# Patient Record
Sex: MALE | Race: BLACK OR AFRICAN AMERICAN | NOT HISPANIC OR LATINO
[De-identification: names, ages, dates, MRNs, and addresses within clinical notes are randomized per-mention and may not be internally consistent; named-entity substitution may affect disease eponyms.]

---

## 2019-11-25 ENCOUNTER — TRANSCRIPTION ENCOUNTER (OUTPATIENT)
Age: 59
End: 2019-11-25

## 2019-12-05 ENCOUNTER — INPATIENT (INPATIENT)
Facility: HOSPITAL | Age: 59
LOS: 4 days | Discharge: HOME | DRG: 300 | End: 2019-12-10
Attending: STUDENT IN AN ORGANIZED HEALTH CARE EDUCATION/TRAINING PROGRAM | Admitting: HOSPITALIST
Payer: COMMERCIAL

## 2019-12-05 VITALS
DIASTOLIC BLOOD PRESSURE: 84 MMHG | HEIGHT: 73 IN | SYSTOLIC BLOOD PRESSURE: 147 MMHG | RESPIRATION RATE: 17 BRPM | TEMPERATURE: 98 F | HEART RATE: 55 BPM | OXYGEN SATURATION: 99 % | WEIGHT: 315 LBS

## 2019-12-05 DIAGNOSIS — I82.492 ACUTE EMBOLISM AND THROMBOSIS OF OTHER SPECIFIED DEEP VEIN OF LEFT LOWER EXTREMITY: ICD-10-CM

## 2019-12-05 LAB
ALBUMIN SERPL ELPH-MCNC: 3.6 G/DL — SIGNIFICANT CHANGE UP (ref 3.3–5)
ALP SERPL-CCNC: 80 U/L — SIGNIFICANT CHANGE UP (ref 40–120)
ALT FLD-CCNC: 9 U/L — LOW (ref 10–45)
ANION GAP SERPL CALC-SCNC: 11 MMOL/L — SIGNIFICANT CHANGE UP (ref 5–17)
APTT BLD: 39.2 SEC — HIGH (ref 27.5–36.3)
AST SERPL-CCNC: 10 U/L — SIGNIFICANT CHANGE UP (ref 10–40)
BILIRUB SERPL-MCNC: 0.3 MG/DL — SIGNIFICANT CHANGE UP (ref 0.2–1.2)
BUN SERPL-MCNC: 12 MG/DL — SIGNIFICANT CHANGE UP (ref 7–23)
CALCIUM SERPL-MCNC: 9.1 MG/DL — SIGNIFICANT CHANGE UP (ref 8.4–10.5)
CHLORIDE SERPL-SCNC: 105 MMOL/L — SIGNIFICANT CHANGE UP (ref 96–108)
CO2 SERPL-SCNC: 25 MMOL/L — SIGNIFICANT CHANGE UP (ref 22–31)
CREAT SERPL-MCNC: 0.78 MG/DL — SIGNIFICANT CHANGE UP (ref 0.5–1.3)
GLUCOSE SERPL-MCNC: 93 MG/DL — SIGNIFICANT CHANGE UP (ref 70–99)
HCT VFR BLD CALC: 32 % — LOW (ref 39–50)
HGB BLD-MCNC: 10 G/DL — LOW (ref 13–17)
INR BLD: 2.12 RATIO — HIGH (ref 0.88–1.16)
MCHC RBC-ENTMCNC: 25.8 PG — LOW (ref 27–34)
MCHC RBC-ENTMCNC: 31.3 GM/DL — LOW (ref 32–36)
MCV RBC AUTO: 82.5 FL — SIGNIFICANT CHANGE UP (ref 80–100)
NRBC # BLD: 0 /100 WBCS — SIGNIFICANT CHANGE UP (ref 0–0)
NT-PROBNP SERPL-SCNC: 276 PG/ML — SIGNIFICANT CHANGE UP (ref 0–300)
PLATELET # BLD AUTO: 301 K/UL — SIGNIFICANT CHANGE UP (ref 150–400)
POTASSIUM SERPL-MCNC: 4.2 MMOL/L — SIGNIFICANT CHANGE UP (ref 3.5–5.3)
POTASSIUM SERPL-SCNC: 4.2 MMOL/L — SIGNIFICANT CHANGE UP (ref 3.5–5.3)
PROT SERPL-MCNC: 6.7 G/DL — SIGNIFICANT CHANGE UP (ref 6–8.3)
PROTHROM AB SERPL-ACNC: 24.7 SEC — HIGH (ref 10–12.9)
RBC # BLD: 3.88 M/UL — LOW (ref 4.2–5.8)
RBC # FLD: 15.3 % — HIGH (ref 10.3–14.5)
SODIUM SERPL-SCNC: 141 MMOL/L — SIGNIFICANT CHANGE UP (ref 135–145)
TROPONIN T, HIGH SENSITIVITY RESULT: 11 NG/L — SIGNIFICANT CHANGE UP (ref 0–51)
TROPONIN T, HIGH SENSITIVITY RESULT: 12 NG/L — SIGNIFICANT CHANGE UP (ref 0–51)
WBC # BLD: 4.61 K/UL — SIGNIFICANT CHANGE UP (ref 3.8–10.5)
WBC # FLD AUTO: 4.61 K/UL — SIGNIFICANT CHANGE UP (ref 3.8–10.5)

## 2019-12-05 PROCEDURE — 93010 ELECTROCARDIOGRAM REPORT: CPT

## 2019-12-05 PROCEDURE — 71046 X-RAY EXAM CHEST 2 VIEWS: CPT | Mod: 26

## 2019-12-05 PROCEDURE — 93971 EXTREMITY STUDY: CPT | Mod: 26

## 2019-12-05 PROCEDURE — 99285 EMERGENCY DEPT VISIT HI MDM: CPT

## 2019-12-05 PROCEDURE — 99223 1ST HOSP IP/OBS HIGH 75: CPT

## 2019-12-05 RX ORDER — ACETAMINOPHEN 500 MG
650 TABLET ORAL EVERY 6 HOURS
Refills: 0 | Status: DISCONTINUED | OUTPATIENT
Start: 2019-12-05 | End: 2019-12-10

## 2019-12-05 RX ORDER — HEPARIN SODIUM 5000 [USP'U]/ML
10000 INJECTION INTRAVENOUS; SUBCUTANEOUS ONCE
Refills: 0 | Status: COMPLETED | OUTPATIENT
Start: 2019-12-05 | End: 2019-12-05

## 2019-12-05 RX ORDER — HEPARIN SODIUM 5000 [USP'U]/ML
INJECTION INTRAVENOUS; SUBCUTANEOUS
Qty: 25000 | Refills: 0 | Status: DISCONTINUED | OUTPATIENT
Start: 2019-12-05 | End: 2019-12-10

## 2019-12-05 RX ORDER — HEPARIN SODIUM 5000 [USP'U]/ML
10000 INJECTION INTRAVENOUS; SUBCUTANEOUS EVERY 6 HOURS
Refills: 0 | Status: DISCONTINUED | OUTPATIENT
Start: 2019-12-05 | End: 2019-12-10

## 2019-12-05 RX ORDER — HEPARIN SODIUM 5000 [USP'U]/ML
5000 INJECTION INTRAVENOUS; SUBCUTANEOUS EVERY 6 HOURS
Refills: 0 | Status: DISCONTINUED | OUTPATIENT
Start: 2019-12-05 | End: 2019-12-10

## 2019-12-05 RX ADMIN — HEPARIN SODIUM 10000 UNIT(S): 5000 INJECTION INTRAVENOUS; SUBCUTANEOUS at 19:59

## 2019-12-05 RX ADMIN — HEPARIN SODIUM 2400 UNIT(S)/HR: 5000 INJECTION INTRAVENOUS; SUBCUTANEOUS at 19:59

## 2019-12-05 NOTE — H&P ADULT - NSHPLABSRESULTS_GEN_ALL_CORE
Personally reviewed labs.   Personally reviewed imaging.                        10.0   4.61  )-----------( 301      ( 05 Dec 2019 12:35 )             32.0       12-05    141  |  105  |  12  ----------------------------<  93  4.2   |  25  |  0.78    Ca    9.1      05 Dec 2019 12:35    TPro  6.7  /  Alb  3.6  /  TBili  0.3  /  DBili  x   /  AST  10  /  ALT  9<L>  /  AlkPhos  80  12-05            LIVER FUNCTIONS - ( 05 Dec 2019 12:35 )  Alb: 3.6 g/dL / Pro: 6.7 g/dL / ALK PHOS: 80 U/L / ALT: 9 U/L / AST: 10 U/L / GGT: x             PT/INR - ( 05 Dec 2019 12:35 )   PT: 24.7 sec;   INR: 2.12 ratio         PTT - ( 05 Dec 2019 12:35 )  PTT:39.2 sec

## 2019-12-05 NOTE — ED PROVIDER NOTE - PMH
DVT (deep venous thrombosis)  LLE, 11/2019, on Xarelto, provoked after 13 hour car ride  Pulmonary embolism  b/l, 11/2019, on Xarelto

## 2019-12-05 NOTE — H&P ADULT - PROBLEM SELECTOR PLAN 4
Transitions of Care Status:  1.  Name of PCP:  2.  PCP Contacted on Admission: [ ] Y    [ ] N    3.  PCP contacted at Discharge: [ ] Y    [ ] N    [ ] N/A  4.  Post-Discharge Appointment Date and Location:  5.  Summary of Handoff given to PCP: - ekg nonischemic, also first degree heart block  - pt asymptomatic  - check tsh

## 2019-12-05 NOTE — ED ADULT TRIAGE NOTE - CHIEF COMPLAINT QUOTE
dx with B/L PE and LLE DVT x2 weeks ago. Left leg and left foot swelling x2 weeks and getting worse. mild SOB with exertion. SPO2 99% room air. Denies chest pain.

## 2019-12-05 NOTE — H&P ADULT - HISTORY OF PRESENT ILLNESS
59M c hx obesity, provoked DVT (~4 yrs ago), recently diagnosed LLE DVT c/b b/l PE (dx'd 2 weeks ago @ [facility]) on xarelto, pw worsening LLE swelling and pain.    Pt doesn't have good memory of his DVT 4 yrs ago, but thinks it was provoked after a plane flight from [place], and that he was treated for approximately 6 months. Pt has otherwise been well since then. Pt reports frequent 13 hour car rides to [place], where he his job takes him. Most recently, pt reports coming back from [place], then feeling SOB, which led him to [facility] and the above diagnoses. Pt reports his SOB is now much better. Several days ago, pt reports trying on a pair of shoes that couldn't fit. Pt also reports taking percocet at home for the pain in his leg, which brings him into the hospital. Pt reports his father passed away at age around 66 2/2 ?leg clot.    VS Tm 98.3, P 55, /72, R 18, 77% RA  in the ED, received hep gtt. 59M c hx obesity, provoked DVT (~4 yrs ago), recently diagnosed LLE DVT c/b b/l PE (dx'd 2 weeks ago @ [facility]) on xarelto, pw worsening LLE swelling and pain.    Pt doesn't have good memory of his DVT 4 yrs ago, but thinks it was provoked after a plane flight from [place], and that he was treated for approximately 6 months. Pt has otherwise been well since then. Pt reports frequent 13 hour car rides to [place], where he his job takes him. Most recently, pt reports coming back from [place], then feeling SOB, which led him to [facility] and the above diagnoses. Pt reports his SOB is now much better. Several days ago, pt reports trying on a pair of shoes that couldn't fit. Pt also reports taking percocet at home for the pain in his leg, which brings him into the hospital. Pt reports his father passed away at age around 66 2/2 ?leg clot.    VS Tm 98.3, P 55, /72, R 18, 77% RA  in the ED, received hep gtt.    ISTOP Reference #: [id number]  Rx Written	Rx Dispensed	Drug	Quantity	Days Supply	Prescriber Name  12/03/2019	12/04/2019	oxycodone-acetaminophen 5-325 mg tablet	21	7	Jones Kennedy MD

## 2019-12-05 NOTE — H&P ADULT - PROBLEM SELECTOR PLAN 1
- per history, appears to be pt's 2nd lifetime provoked DVT  - per ED notes, comparison with duplex report from [facility] shows extension of LLE DVT, concerning for failure of xarelto  - f/u Heme recs  - per heme, pt should be on hep gtt bridged to coumadin  - will give one dose coumadin tonight  - of note, pt has prolonged PT/INR before starting coumadin, presumed 2/2 xarelto, which pt reports good compliance with  - pt requests placement on a different novel oral anticoagulant rather than lovenox or coumadin. will need discussion with heme  - will check lupus anticoagulant in AM  - other hypercoag workup deferred to heme  - hold off IVC filter for now

## 2019-12-05 NOTE — H&P ADULT - NSHPSOCIALHISTORY_GEN_ALL_CORE
Social History:    Marital Status: ( x ) , (  ) Single, (  ) , (  ) , (  )   # of Children: no biologic children. 2 adopted sons  Lives with: (  ) alone, ( x ) children, ( x ) spouse, (  ) parents, (  ) siblings, (  ) friends, (  ) other:   Occupation:     Substance Use/Illicit Drugs: (  ) never used vs other:   Tobacco Usage: ( x ) never smoked, (  ) former smoker, (  ) current smoker and Total Pack-Years:   Last Alcohol Usage/Frequency/Amount/Withdrawal/Hx of Abuse:  last drink 1 month ago  Foreign travel:   Animal exposure:

## 2019-12-05 NOTE — ED PROVIDER NOTE - PROGRESS NOTE DETAILS
called [facility], ER unable to provide [facility] with report verbalize or faxed, must obtain from medical records. Medical records requires consent form to be filled out by pt and faxed.   d/w pt PCP, called by pt wife, states he has been managing pt for superficial thrombophlebitis outpt. Would appreciate vascular consult when comparison study is obtained if there is a significant difference between the two studies.  Consent form obtained and signed, given to  to fax.  Pt wife becoming impatient frequently at the provider charting area reminding staff that she wants to leave. -Tom Person PA-C Previous study at [facility] shows occlusive L popliteal DVT, occlusive left small saphenous vein thrombosis and L thigh thrombophlebitis.  Significant increase in size of clot reported doppler today. results d/w pt PCP Dr. Kennedy, who states pt should be admitted to the hospitalist. d/w hospitalist who requests hematology input. Hematology fellow recommends admission for Heparin and bridge to coumadin given pt failed DOAC. recommendations d/w hospitalist who accepts pt for new admission. -Tom Person PA-C

## 2019-12-05 NOTE — ED PROVIDER NOTE - CARE PLAN
Principal Discharge DX:	Acute deep vein thrombosis (DVT) of other specified vein of left lower extremity

## 2019-12-05 NOTE — ED PROVIDER NOTE - PHYSICAL EXAMINATION
GEN: Pt in NAD, A&O x3.  PSYCH: Affect appropriate.  EYES: Sclera white w/o injection.   RESP: No chest wall tenderness, CTA b/l, no wheezes, rales, or rhonchi.   CARDIAC: RRR, clear distinct S1, S2, no appreciable murmurs.  ABD: Abdomen soft, non-tender. No CVAT b/l.  VASC: 2+ radial and dorsalis pedis pulses b/l. LLE 3+ pitting edema, 1+ edema RLE (pt and wife states is baseline). LLE warm. Mild L calf tenderness. Negative Homans.  MSK: FROM w/o pain b/l LE.  NEURO: Normal and equal sensation and 5/5 strength b/l LE.  SKIN: No rashes or erythema on b/l LE.

## 2019-12-05 NOTE — ED ADULT NURSE NOTE - PAIN: PRESENCE, MLM
----- Message from Toan Keys MD sent at 5/18/2017  3:12 PM CDT -----  Regarding: RE: CT Results  yes  ----- Message -----     From: Davida Lopez RN     Sent: 5/18/2017   3:10 PM       To: Toan Keys MD  Subject: CT Results                                       Patient requesting a copy of the CT results from 05/16/17. Is is OK to give him a copy?    Thanks,  Davida       complains of pain/discomfort

## 2019-12-05 NOTE — H&P ADULT - NSICDXPASTMEDICALHX_GEN_ALL_CORE_FT
PAST MEDICAL HISTORY:  DVT (deep venous thrombosis) LLE, 11/2019, on Xarelto, provoked after 13 hour car ride    Pulmonary embolism b/l, 11/2019, on Xarelto

## 2019-12-05 NOTE — H&P ADULT - PROBLEM SELECTOR PLAN 3
Transitions of Care Status:  1.  Name of PCP:  2.  PCP Contacted on Admission: [ ] Y    [ ] N    3.  PCP contacted at Discharge: [ ] Y    [ ] N    [ ] N/A  4.  Post-Discharge Appointment Date and Location:  5.  Summary of Handoff given to PCP: - anemia labs  - pt denies bleeding anywhere  - f/u with pmd for further anemia workup

## 2019-12-05 NOTE — H&P ADULT - NSHPREVIEWOFSYSTEMS_GEN_ALL_CORE
REVIEW OF SYSTEMS:  CONSTITUTIONAL: No weakness. No fevers. No chills. No rigors. No weight loss. No night sweats. No poor appetite.  EYES: No visual changes. No eye pain.  ENT: No hearing difficulty. No vertigo. No dysphagia. No sore throat. No Sinusitis/rhinorrhea.   NECK: No pain. No stiffness/rigidity.  CARDIAC: No chest pain. No palpitations. No lightheadedness.  RESPIRATORY: No cough. Minimal SOB. No hemoptysis.  GASTROINTESTINAL: No abdominal pain. No nausea. No vomiting. No hematemesis. No diarrhea. No constipation. No melena. No hematochezia.  GENITOURINARY: No dysuria. No frequency. No hesitancy. No hematuria. No oliguria.  NEUROLOGICAL: No numbness/tingling. No focal weakness. No urinary or fecal incontinence. No headache. No unsteady gait.  BACK: No back pain. No flank pain.  EXTREMITIES: +L>R lower extremity edema. Full ROM. No joint pain. +mild left leg pain  SKIN: No rashes. No itching. No other lesions.  PSYCHIATRIC: No depression. No anxiety. No SI/HI.  ALLERGIC: No lip swelling. No hives.  All other review of systems is negative unless indicated above.  Unless indicated above, unable to assess ROS 2/2

## 2019-12-05 NOTE — ED ADULT NURSE REASSESSMENT NOTE - NS ED NURSE REASSESS COMMENT FT1
faxed reports sent from [facility] are missing US report, RACHEL Umaña to call again for results

## 2019-12-05 NOTE — ED ADULT NURSE NOTE - OBJECTIVE STATEMENT
Pt c/o LLE swelling and pain.  Pt with hx of left LE DVT and b/l PEs (s/p long car ride) with stay at [facility] d/c 2 weeks ago on Xarelto (taking as instructed).  SOB has been improving but LE pain has been worsening.  Called his PMD 2 days ago who RX percocet over the phone.  Presents today w/ complaint of LE pain worsening.  Pt morbidly obese, conversive in full sentences without distress, abd soft/nt, left LE with significant swelling (reports worsening since d/c 2 weeks ago), posterior left calf pain, skin wdi, denies chest pain, syncope, lightheadedness, rectal bleeding, hematuria, abd pain, nvd, fevers, falls.  CCM and pulse ox in place.

## 2019-12-05 NOTE — ED ADULT TRIAGE NOTE - BP NONINVASIVE DIASTOLIC (MM HG)
Care Coordinator Progress Note     Admission Date/Time:  11/6/2017  Attending MD:  Neftali Mcknight MD     Data  Chart reviewed, discussed with interdisciplinary team.   Patient was admitted for:    Acute respiratory failure with hypoxia (H)  Xanax use disorder, severe, dependence (H)  Alcohol abuse  Cigarette smoker  Sedative dependence (H)  Alcohol abuse, continuous.    Concerns with insurance coverage for discharge needs: None.  Current Living Situation: Patient lives alone.  Support System: Supportive  Services Involved: PCA- patient has homemaking service per chart review but not able to confirm with patient today.  Transportation: Family or Friend will provide  Barriers to Discharge: not medically stable per Dr Mcknight- O2 desats with mobility and tremors    Coordination of Care and Referrals: No referrals initiated       Assessment  Patient admitted with EtOH withdrawal and hypoxemic respiratory failure. Attempted to meet with patient 3 times today however he was either out of room or soundly sleeping. Per chart review, [facility]. Patient declined PT eval per notes. RNCC will attempt to visit with patient tomorrow to assess discharge needs.      Plan  Anticipated Discharge Date:  Thurs 11/9 if medically stable  Anticipated Discharge Plan: home    Nereida Carbone RN  Care Coordinator  Pager [phone]           [phone]

## 2019-12-05 NOTE — ED PROVIDER NOTE - CLINICAL SUMMARY MEDICAL DECISION MAKING FREE TEXT BOX
Joe: 59 year old male with le dvt 2 weeks ago and PE on xarelto here with increase lle swellign. pain improved since diagnosis. notes sob improved since xarelto. will get labs, ekg, repeat dvt study on lle and attempt to obtain records from [facility] to compare.

## 2019-12-05 NOTE — ED ADULT TRIAGE NOTE - CCCP TRG CHIEF CMPLNT
left leg and left foot swelling x2 weeks and getting worse. dx with of DVT/PE in b/l Lungs x2 weeks ago. mild SOB with exertion. SPO2 99% room air. dx with B/L PE and LLE DVT x2 weeks ago. Left leg and left foot swelling x2 weeks and getting worse. mild SOB with exertion. SPO2 99% room air. Denies chest pain.

## 2019-12-05 NOTE — ED PROVIDER NOTE - OBJECTIVE STATEMENT
58 y/o M h/o LE DVT 4 years ago (provoked after long plane ride to [place], completed 6months of AC), recent Dx with LLE DVT and b/l PE at [facility]- admitted 2 weeks ago, DC on Xarelto- presents c/o worsening LLE swelling and pain. Pt notes since DC pt has noted progressively worsening LLE swelling and calf pain. Called his PCP and was prescribed percocet PRN, but has had no formal medical evaluation since DC from the hospital. No reports brought to ED. Pt notes currently mild L calf pain with palpation, no increased pain with moving, no pain meds taken today. Pt notes SOB has improved since starting Xarelto, currently only has mild KELLY. Pt denies f/c, leg erythema/warmth, joint pain, numbness, paresthesias, weakness, difficulty ambulating.  PCP: Dr. Jones Kennedy 58 y/o M h/o LE DVT 4 years ago (provoked after long plane ride to [place], completed 6months of AC), recent Dx with LLE DVT and b/l PE at [facility]- admitted 2 weeks ago, DC on Xarelto- presents c/o worsening LLE swelling and pain. Pt notes since DC pt has noted progressively worsening LLE swelling and calf pain. Called his PCP and was prescribed percocet PRN, but has had no formal medical evaluation since DC from the hospital. No reports brought to ED. Pt notes currently mild L calf pain with palpation, no increased pain with moving, no pain meds taken today. Pt notes SOB has improved since starting Xarelto, currently only has mild KELLY. Pt denies f/c, leg erythema/warmth, joint pain, numbness, paresthesias, weakness, difficulty ambulating, hemoptysis, hematemesis, hematuria, hematochezia, melena.  PCP: Dr. Jones Kennedy 58 y/o M h/o LE DVT 4 years ago (provoked after long plane ride to [place], completed 6months of AC), recent Dx with LLE DVT and b/l PE at [facility]- admitted 2 weeks ago, DC on Xarelto- presents c/o worsening LLE swelling and pain. Pt notes since DC pt has noted progressively worsening LLE swelling and calf pain. Called his PCP and was prescribed percocet PRN, but has had no formal medical evaluation since DC from the hospital. No reports brought to ED. Pt notes currently mild L calf pain with palpation, no increased pain with moving, no pain meds taken today. Pt notes SOB has improved since starting Xarelto, currently only has mild KELLY. Pt denies f/c, leg erythema/warmth, joint pain, numbness, paresthesias, weakness, difficulty ambulating, hemoptysis, hematemesis, hematuria, hematochezia, melena. Did not take pain meds today and does not want pain medication at this time.  PCP: Dr. Jones Kennedy

## 2019-12-05 NOTE — H&P ADULT - ASSESSMENT
59M c hx obesity, provoked DVT (~4 yrs ago), recently diagnosed LLE DVT c/b b/l PE (dx'd 2 weeks ago @ [facility]) on xarelto, pw extension of LLE DVT, poss failure of xarelto

## 2019-12-05 NOTE — H&P ADULT - PROBLEM SELECTOR PROBLEM 2
Pulmonary embolism, unspecified chronicity, unspecified pulmonary embolism type, unspecified whether acute cor pulmonale present

## 2019-12-05 NOTE — H&P ADULT - NSHPPHYSICALEXAM_GEN_ALL_CORE
PHYSICAL EXAM:   GENERAL: Alert. Not confused. No acute distress. Not thin. Not cachectic. +obese.  HEAD:  Atraumatic. Normocephalic.  EYES: EOMI. PERRLA. Normal conjunctiva/sclera.  ENT: Neck supple. No JVD. Moist oral mucosa. Not edentulous. No thrush.  LYMPH: Normal supraclavicular/cervical lymph nodes.   CARDIAC: Not tachy, Not vladimir. Regular rhythm. Not irregularly irregular. S1. S2. No murmur. No rub. No distant heart sounds.  LUNG/CHEST: CTAB. BS equal bilaterally. No wheezes. No rales. No rhonchi.  ABDOMEN: Soft. No tenderness. No distension. No fluid wave. Normal bowel sounds.  BACK: No midline/vertebral tenderness. No flank tenderness.  VASCULAR: +2 b/l radial or ulnar pulses. Palpable DP pulses.  EXTREMITIES:  No clubbing. No cyanosis. +2 LLE minimally pitting edema, +1 RLE nonpitting edema. Moving all 4.  NEUROLOGY: A&Ox3. Non-focal exam. Cranial nerves intact. Normal speech. Sensation intact.  PSYCH: Normal behavior. Normal affect.  SKIN: No jaundice. No erythema. No rash/lesion.  Vascular Access:     ICU Vital Signs Last 24 Hrs  T(C): 36.6 (05 Dec 2019 21:40), Max: 36.8 (05 Dec 2019 10:36)  T(F): 97.9 (05 Dec 2019 21:40), Max: 98.3 (05 Dec 2019 10:36)  HR: 55 (05 Dec 2019 21:40) (55 - 69)  BP: 159/83 (05 Dec 2019 21:40) (144/72 - 159/83)  BP(mean): --  ABP: --  ABP(mean): --  RR: 18 (05 Dec 2019 21:40) (16 - 18)  SpO2: 96% (05 Dec 2019 21:40) (96% - 99%)      I&O's Summary

## 2019-12-06 DIAGNOSIS — I26.99 OTHER PULMONARY EMBOLISM WITHOUT ACUTE COR PULMONALE: ICD-10-CM

## 2019-12-06 DIAGNOSIS — I82.409 ACUTE EMBOLISM AND THROMBOSIS OF UNSPECIFIED DEEP VEINS OF UNSPECIFIED LOWER EXTREMITY: ICD-10-CM

## 2019-12-06 DIAGNOSIS — R00.1 BRADYCARDIA, UNSPECIFIED: ICD-10-CM

## 2019-12-06 DIAGNOSIS — I82.412 ACUTE EMBOLISM AND THROMBOSIS OF LEFT FEMORAL VEIN: ICD-10-CM

## 2019-12-06 DIAGNOSIS — Z02.9 ENCOUNTER FOR ADMINISTRATIVE EXAMINATIONS, UNSPECIFIED: ICD-10-CM

## 2019-12-06 DIAGNOSIS — D64.9 ANEMIA, UNSPECIFIED: ICD-10-CM

## 2019-12-06 LAB
ALBUMIN SERPL ELPH-MCNC: 3.7 G/DL — SIGNIFICANT CHANGE UP (ref 3.3–5)
ALP SERPL-CCNC: 78 U/L — SIGNIFICANT CHANGE UP (ref 40–120)
ALT FLD-CCNC: 8 U/L — LOW (ref 10–45)
ANION GAP SERPL CALC-SCNC: 13 MMOL/L — SIGNIFICANT CHANGE UP (ref 5–17)
APTT BLD: 158.5 SEC — CRITICAL HIGH (ref 27.5–36.3)
APTT BLD: 60.7 SEC — HIGH (ref 27.5–36.3)
APTT BLD: 83.2 SEC — HIGH (ref 27.5–36.3)
AST SERPL-CCNC: 13 U/L — SIGNIFICANT CHANGE UP (ref 10–40)
BASOPHILS # BLD AUTO: 0.03 K/UL — SIGNIFICANT CHANGE UP (ref 0–0.2)
BASOPHILS NFR BLD AUTO: 0.6 % — SIGNIFICANT CHANGE UP (ref 0–2)
BILIRUB SERPL-MCNC: 0.3 MG/DL — SIGNIFICANT CHANGE UP (ref 0.2–1.2)
BUN SERPL-MCNC: 10 MG/DL — SIGNIFICANT CHANGE UP (ref 7–23)
CALCIUM SERPL-MCNC: 8.8 MG/DL — SIGNIFICANT CHANGE UP (ref 8.4–10.5)
CHLORIDE SERPL-SCNC: 104 MMOL/L — SIGNIFICANT CHANGE UP (ref 96–108)
CO2 SERPL-SCNC: 26 MMOL/L — SIGNIFICANT CHANGE UP (ref 22–31)
CREAT SERPL-MCNC: 0.75 MG/DL — SIGNIFICANT CHANGE UP (ref 0.5–1.3)
DRVVT SCREEN TO CONFIRM RATIO: SIGNIFICANT CHANGE UP
EOSINOPHIL # BLD AUTO: 0.15 K/UL — SIGNIFICANT CHANGE UP (ref 0–0.5)
EOSINOPHIL NFR BLD AUTO: 3.2 % — SIGNIFICANT CHANGE UP (ref 0–6)
FERRITIN SERPL-MCNC: 26 NG/ML — LOW (ref 30–400)
GLUCOSE SERPL-MCNC: 88 MG/DL — SIGNIFICANT CHANGE UP (ref 70–99)
HCT VFR BLD CALC: 32 % — LOW (ref 39–50)
HCT VFR BLD CALC: 32.3 % — LOW (ref 39–50)
HCV AB S/CO SERPL IA: 0.12 S/CO — SIGNIFICANT CHANGE UP (ref 0–0.99)
HCV AB SERPL-IMP: SIGNIFICANT CHANGE UP
HGB BLD-MCNC: 10.2 G/DL — LOW (ref 13–17)
HGB BLD-MCNC: 9.9 G/DL — LOW (ref 13–17)
IMM GRANULOCYTES NFR BLD AUTO: 0.4 % — SIGNIFICANT CHANGE UP (ref 0–1.5)
INR BLD: 1.57 RATIO — HIGH (ref 0.88–1.16)
IRON SATN MFR SERPL: 27 UG/DL — LOW (ref 45–165)
IRON SATN MFR SERPL: 8 % — LOW (ref 16–55)
LA NT DPL PPP QL: 39.2 SEC — SIGNIFICANT CHANGE UP
LYMPHOCYTES # BLD AUTO: 1.75 K/UL — SIGNIFICANT CHANGE UP (ref 1–3.3)
LYMPHOCYTES # BLD AUTO: 37.5 % — SIGNIFICANT CHANGE UP (ref 13–44)
MAGNESIUM SERPL-MCNC: 2.1 MG/DL — SIGNIFICANT CHANGE UP (ref 1.6–2.6)
MCHC RBC-ENTMCNC: 25.4 PG — LOW (ref 27–34)
MCHC RBC-ENTMCNC: 25.9 PG — LOW (ref 27–34)
MCHC RBC-ENTMCNC: 30.9 GM/DL — LOW (ref 32–36)
MCHC RBC-ENTMCNC: 31.6 GM/DL — LOW (ref 32–36)
MCV RBC AUTO: 82 FL — SIGNIFICANT CHANGE UP (ref 80–100)
MCV RBC AUTO: 82.3 FL — SIGNIFICANT CHANGE UP (ref 80–100)
MONOCYTES # BLD AUTO: 0.43 K/UL — SIGNIFICANT CHANGE UP (ref 0–0.9)
MONOCYTES NFR BLD AUTO: 9.2 % — SIGNIFICANT CHANGE UP (ref 2–14)
NEUTROPHILS # BLD AUTO: 2.29 K/UL — SIGNIFICANT CHANGE UP (ref 1.8–7.4)
NEUTROPHILS NFR BLD AUTO: 49.1 % — SIGNIFICANT CHANGE UP (ref 43–77)
NORMALIZED SCT PPP-RTO: 0.83 RATIO — SIGNIFICANT CHANGE UP (ref 0–1.16)
NORMALIZED SCT PPP-RTO: SIGNIFICANT CHANGE UP
NRBC # BLD: 0 /100 WBCS — SIGNIFICANT CHANGE UP (ref 0–0)
NRBC # BLD: 0 /100 WBCS — SIGNIFICANT CHANGE UP (ref 0–0)
PHOSPHATE SERPL-MCNC: 3.3 MG/DL — SIGNIFICANT CHANGE UP (ref 2.5–4.5)
PLATELET # BLD AUTO: 295 K/UL — SIGNIFICANT CHANGE UP (ref 150–400)
PLATELET # BLD AUTO: 299 K/UL — SIGNIFICANT CHANGE UP (ref 150–400)
POTASSIUM SERPL-MCNC: 3.8 MMOL/L — SIGNIFICANT CHANGE UP (ref 3.5–5.3)
POTASSIUM SERPL-SCNC: 3.8 MMOL/L — SIGNIFICANT CHANGE UP (ref 3.5–5.3)
PROT SERPL-MCNC: 6.5 G/DL — SIGNIFICANT CHANGE UP (ref 6–8.3)
PROTHROM AB SERPL-ACNC: 18.2 SEC — HIGH (ref 10–12.9)
RBC # BLD: 3.89 M/UL — LOW (ref 4.2–5.8)
RBC # BLD: 3.94 M/UL — LOW (ref 4.2–5.8)
RBC # BLD: 3.98 M/UL — LOW (ref 4.2–5.8)
RBC # FLD: 15.1 % — HIGH (ref 10.3–14.5)
RBC # FLD: 15.2 % — HIGH (ref 10.3–14.5)
RETICS #: 44.6 K/UL — SIGNIFICANT CHANGE UP (ref 25–125)
RETICS/RBC NFR: 1.1 % — SIGNIFICANT CHANGE UP (ref 0.5–2.5)
SODIUM SERPL-SCNC: 143 MMOL/L — SIGNIFICANT CHANGE UP (ref 135–145)
TIBC SERPL-MCNC: 321 UG/DL — SIGNIFICANT CHANGE UP (ref 220–430)
TSH SERPL-MCNC: 4.72 UIU/ML — HIGH (ref 0.27–4.2)
UIBC SERPL-MCNC: 294 UG/DL — SIGNIFICANT CHANGE UP (ref 110–370)
WBC # BLD: 4.67 K/UL — SIGNIFICANT CHANGE UP (ref 3.8–10.5)
WBC # BLD: 4.95 K/UL — SIGNIFICANT CHANGE UP (ref 3.8–10.5)
WBC # FLD AUTO: 4.67 K/UL — SIGNIFICANT CHANGE UP (ref 3.8–10.5)
WBC # FLD AUTO: 4.95 K/UL — SIGNIFICANT CHANGE UP (ref 3.8–10.5)

## 2019-12-06 PROCEDURE — 99254 IP/OBS CNSLTJ NEW/EST MOD 60: CPT | Mod: GC

## 2019-12-06 PROCEDURE — 99233 SBSQ HOSP IP/OBS HIGH 50: CPT | Mod: GC

## 2019-12-06 RX ORDER — WARFARIN SODIUM 2.5 MG/1
5 TABLET ORAL DAILY
Refills: 0 | Status: DISCONTINUED | OUTPATIENT
Start: 2019-12-06 | End: 2019-12-06

## 2019-12-06 RX ORDER — WARFARIN SODIUM 2.5 MG/1
5 TABLET ORAL ONCE
Refills: 0 | Status: COMPLETED | OUTPATIENT
Start: 2019-12-06 | End: 2019-12-06

## 2019-12-06 RX ADMIN — HEPARIN SODIUM 2000 UNIT(S)/HR: 5000 INJECTION INTRAVENOUS; SUBCUTANEOUS at 08:00

## 2019-12-06 RX ADMIN — HEPARIN SODIUM 2000 UNIT(S)/HR: 5000 INJECTION INTRAVENOUS; SUBCUTANEOUS at 03:55

## 2019-12-06 RX ADMIN — HEPARIN SODIUM 2000 UNIT(S)/HR: 5000 INJECTION INTRAVENOUS; SUBCUTANEOUS at 14:05

## 2019-12-06 RX ADMIN — HEPARIN SODIUM 0 UNIT(S)/HR: 5000 INJECTION INTRAVENOUS; SUBCUTANEOUS at 02:53

## 2019-12-06 RX ADMIN — WARFARIN SODIUM 5 MILLIGRAM(S): 2.5 TABLET ORAL at 21:31

## 2019-12-06 NOTE — CONSULT NOTE ADULT - ASSESSMENT
INCOMPLETE.  Wait for final recs from Heme attending.    59M c hx obesity, provoked DVT (~4 yrs ago), recently diagnosed LLE DVT c/b b/l PE (dx'd 2 weeks ago @ [facility]) on xarelto, pw worsening LLE swelling and pain, found to have extension of DVT.    Extension of DVT  - will need to examine report from [facility] and compare it to findings here to confirm extension  - will need to confirm if patient was compliant with Xarelto after his discharge from [facility]  - if he was compliant and it is confirmed the clot has extended, it appears he may have failed Xarelto and would need to be on Lovenox vs Coumadin  - noted that team sent Lupus Profile.  Please add B2 glycoprotein to this as well.  Will also need to send Factor V Leiden and Prothrombin Gene mutation, will discuss with attending whether to send inpatient vs outpatient.    Ifrah Robledo DO  Hematology/Oncology Fellow, PGY5  Pager: [phone] 59M c hx obesity, provoked DVT (~4 yrs ago), recently diagnosed LLE DVT c/b b/l PE (dx'd 2 weeks ago @ [facility]) on xarelto, pw worsening LLE swelling and pain, found to have extension of DVT.    Extension of DVT  - US from [facility] states thrombus in L Popliteal, US here confirms extension.  - CTA report from [facility] confirms PE  - patient reports compliance with xarelto after his discharge  - he has failed Xarelto needs to be on Coumadin.  This was explained to the patient and he is in agreement.  Would start hep gtt bridge to coumadin now.  - f/u LAC, B2 glycoprotein, anti cardiolipin antibodies  - Factor V Leiden was sent at [facility] and was negative  - send Prothrombin gene mutation  - Antithrombin III noted to be decreased at [facility].  This can be abnormal while on heparin, can be repeated as outpatient  - recommend CT A/P to rule out occult malignancy  - colonoscopy per patient 1 year ago was normal    Iron Deficiency anemia 2/2 Gastric Bypass  - pt reports long standing hx  - start PO iron BID  - check B12/folate, would supplement if low    Ifrah Robledo, DO  Hematology/Oncology Fellow, PGY5  Pager: [phone] 59M c hx obesity, provoked DVT (~4 yrs ago), recently diagnosed LLE DVT c/b b/l PE (dx'd 2 weeks ago @ [facility]) on xarelto, pw worsening LLE swelling and pain, found to have extension of DVT.    Extension of DVT  - US from [facility] states thrombus in L Popliteal, US here confirms extension.  - CTA report from [facility] confirms PE  - patient reports compliance with xarelto after his discharge  - he has failed Xarelto needs to be on Coumadin.  This was explained to the patient and he is in agreement.  Would start hep gtt bridge to coumadin now.  - f/u LAC, B2 glycoprotein, anti cardiolipin antibodies  - Factor V Leiden was sent at [facility] and was negative  - send Prothrombin gene mutation  - Antithrombin III noted to be decreased at [facility].  This can be abnormal while on heparin, can be repeated as outpatient  - recommend CT A/P to rule out occult malignancy  - colonoscopy per patient 1 year ago was normal    Iron Deficiency anemia 2/2 Gastric Bypass  - pt reports long standing hx  - start PO iron BID with Vitamin C and bowel regimen  - check B12/folate, would supplement with B12 injection if low    Ifrah Robledo,   Hematology/Oncology Fellow, PGY5  Pager: [phone]

## 2019-12-06 NOTE — PROGRESS NOTE ADULT - PROBLEM SELECTOR PLAN 1
- per history, appears to be pt's 2nd lifetime provoked DVT  - per ED notes, comparison with duplex report from [facility] shows extension of LLE DVT, concerning for failure of xarelto  - f/u Heme recs  - per heme, pt should be on hep gtt bridged to coumadin  - will give one dose coumadin tonight  - of note, pt has prolonged PT/INR before starting coumadin, presumed 2/2 xarelto, which pt reports good compliance with  - pt requests placement on a different novel oral anticoagulant rather than lovenox or coumadin. will need discussion with heme  - will check lupus anticoagulant in AM  - other hypercoag workup deferred to heme  - hold off IVC filter for now - per history, appears to be pt's 2nd lifetime provoked DVT  - per ED notes, comparison with duplex report from [facility] shows extension of LLE DVT, concerning for failure of xarelto especially given patient's large body habitus.  - per heme, pt should be on hep gtt bridged to coumadin. Pt initially refused starting coumadin because it was explained that it is initially prothrombotic. Explained to patient that risk for thrombosis is mitigated by heparin ggt and he is now amenable to coumadin  - of note, pt has prolonged PT/INR before starting coumadin, presumed 2/2 xarelto, which pt reports good compliance with  - lupus anticoagulant panel negative  - other hypercoag workup deferred to heme  - hematology consulted. Will follow up their recommendations.

## 2019-12-06 NOTE — PROGRESS NOTE ADULT - PROBLEM SELECTOR PLAN 3
- anemia labs  - pt denies bleeding anywhere  - f/u with pmd for further anemia workup - anemia labs c/w some componenet of DONNELL; will consider starting iron supplement  - pt denies bleeding anywhere  - f/u with pmd for further anemia workup

## 2019-12-06 NOTE — PROGRESS NOTE ADULT - ASSESSMENT
59M c hx obesity, provoked DVT (~4 yrs ago), recently diagnosed LLE DVT c/b b/l PE (dx'd 2 weeks ago @ [facility]) on xarelto, pw extension of LLE DVT, poss failure of xarelto

## 2019-12-06 NOTE — CONSULT NOTE ADULT - SUBJECTIVE AND OBJECTIVE BOX
PULMONARY CONSULT    HPI: 58 y/o M with PMH of obesity, provoked DVT (~4 yrs ago), recently diagnosed LLE DVT and extensive bilateral PE ( at [facility]) on Xarelto now presents with worsened LLE edema, new since discharge. Found to have extension of LLE DVT concerning for Xarelto failure. Patient endorses taking all doses of Xarelto as prescribed.     Hypercoagulable w/u at [facility]: negative factor V Leiden, low AT III, normal protein C, homocysteine.   Risk factors: recent prolonged stasis (frequent drives to [place]), +family history-father  of VTE      PAST MEDICAL & SURGICAL HISTORY:  DVT (deep venous thrombosis): LLE, 2019, on Xarelto, provoked after 13 hour car ride  Pulmonary embolism: b/l, 2019, on Xarelto  No significant past surgical history    Allergies    No Known Allergies    Intolerances      FAMILY HISTORY:  FH: kidney disease  Family history of DVT    Social history: Never Smoker     Review of Systems:  CONSTITUTIONAL: No fever, chills, or fatigue  EYES: No eye pain, visual disturbances, or discharge  ENMT:  No difficulty hearing, tinnitus, vertigo; No sinus or throat pain  NECK: No pain or stiffness  RESPIRATORY: Per above  CARDIOVASCULAR: No chest pain, palpitations, dizziness, or leg swelling  GASTROINTESTINAL: No abdominal or epigastric pain. No nausea, vomiting, or hematemesis; No diarrhea or constipation. No melena or hematochezia.  GENITOURINARY: No dysuria, frequency, hematuria, or incontinence  NEUROLOGICAL: No headaches, memory loss, loss of strength, numbness, or tremors  SKIN: No itching, burning, rashes, or lesions   MUSCULOSKELETAL: No joint pain or swelling; No muscle, back, or extremity pain  PSYCHIATRIC: No depression, anxiety, mood swings, or difficulty sleeping      Medications:  MEDICATIONS  (STANDING):  heparin  Infusion.  Unit(s)/Hr (24 mL/Hr) IV Continuous <Continuous>    MEDICATIONS  (PRN):  acetaminophen   Tablet .. 650 milliGRAM(s) Oral every 6 hours PRN Mild Pain (1 - 3)  heparin  Injectable 21036 Unit(s) IV Push every 6 hours PRN For aPTT less than 40  heparin  Injectable 5000 Unit(s) IV Push every 6 hours PRN For aPTT between 40 - 57            Vital Signs Last 24 Hrs  T(C): 36.9 (06 Dec 2019 14:06), Max: 36.9 (06 Dec 2019 14:06)  T(F): 98.4 (06 Dec 2019 14:06), Max: 98.4 (06 Dec 2019 14:06)  HR: 63 (06 Dec 2019 14:06) (50 - 69)  BP: 148/90 (06 Dec 2019 14:06) (144/72 - 159/83)  BP(mean): --  RR: 18 (06 Dec 2019 14:06) (16 - 18)  SpO2: 98% (06 Dec 2019 14:06) (96% - 98%) on RA             @ 07:01  -   @ 07:00  --------------------------------------------------------  IN: 196 mL / OUT: 1150 mL / NET: -954 mL          LABS:                        10.2   4.67  )-----------( 299      ( 06 Dec 2019 06:47 )             32.3         143  |  104  |  10  ----------------------------<  88  3.8   |  26  |  0.75    Ca    8.8      06 Dec 2019 06:42  Phos  3.3       Mg     2.1         TPro  6.5  /  Alb  3.7  /  TBili  0.3  /  DBili  x   /  AST  13  /  ALT  8<L>  /  AlkPhos  78            CAPILLARY BLOOD GLUCOSE        PT/INR - ( 06 Dec 2019 06:47 )   PT: 18.2 sec;   INR: 1.57 ratio         PTT - ( 06 Dec 2019 13:27 )  PTT:83.2 sec      Serum Pro-Brain Natriuretic Peptide: 276 pg/mL (19 @ 12:35)        Physical Examination:    General: No acute distress.      HEENT: Pupils equal, reactive to light.  Symmetric.    PULM: Clear to auscultation bilaterally, no significant sputum production    CVS: S1, S2    ABD: Soft, nondistended, nontender, normoactive bowel sounds, no masses    EXT: No edema, nontender    SKIN: Warm and well perfused, no rashes noted.    NEURO: Alert, oriented, interactive, nonfocal    RADIOLOGY REVIEWED  CXR:    CT chest:    TTE: PULMONARY CONSULT    HPI: 60 y/o M with PMH of obesity, provoked DVT (~4 yrs ago), recently diagnosed LLE DVT and extensive bilateral PE ( at [facility]) on Xarelto now presents with worsened LLE edema, new since discharge. Found to have extension of LLE DVT concerning for Xarelto failure. Patient endorses taking all doses of Xarelto as prescribed.     Hypercoagulable w/u at [facility]: negative factor V Leiden, low AT III, normal protein C, homocysteine.   Risk factors: recent prolonged stasis (frequent drives to [place]), +family history-father  of VTE      PAST MEDICAL & SURGICAL HISTORY:  DVT (deep venous thrombosis): LLE, 2019, on Xarelto, provoked after 13 hour car ride  Pulmonary embolism: b/l, 2019, on Xarelto  No significant past surgical history    Allergies    No Known Allergies    Intolerances      FAMILY HISTORY:  FH: kidney disease  Family history of DVT    Social history: Never Smoker     Review of Systems:  CONSTITUTIONAL: No fever, chills, or fatigue  EYES: No eye pain, visual disturbances, or discharge  ENMT:  No difficulty hearing, tinnitus, vertigo; No sinus or throat pain  NECK: No pain or stiffness  RESPIRATORY: Per above  CARDIOVASCULAR: No chest pain, palpitations, dizziness, or leg swelling  GASTROINTESTINAL: No abdominal or epigastric pain. No nausea, vomiting, or hematemesis; No diarrhea or constipation. No melena or hematochezia.  GENITOURINARY: No dysuria, frequency, hematuria, or incontinence  NEUROLOGICAL: No headaches, memory loss, loss of strength, numbness, or tremors  SKIN: No itching, burning, rashes, or lesions   MUSCULOSKELETAL: No joint pain or swelling; No muscle, back, or extremity pain  PSYCHIATRIC: No depression, anxiety, mood swings, or difficulty sleeping      Medications:  MEDICATIONS  (STANDING):  heparin  Infusion.  Unit(s)/Hr (24 mL/Hr) IV Continuous <Continuous>    MEDICATIONS  (PRN):  acetaminophen   Tablet .. 650 milliGRAM(s) Oral every 6 hours PRN Mild Pain (1 - 3)  heparin  Injectable 30432 Unit(s) IV Push every 6 hours PRN For aPTT less than 40  heparin  Injectable 5000 Unit(s) IV Push every 6 hours PRN For aPTT between 40 - 57            Vital Signs Last 24 Hrs  T(C): 36.9 (06 Dec 2019 14:06), Max: 36.9 (06 Dec 2019 14:06)  T(F): 98.4 (06 Dec 2019 14:06), Max: 98.4 (06 Dec 2019 14:06)  HR: 63 (06 Dec 2019 14:06) (50 - 69)  BP: 148/90 (06 Dec 2019 14:06) (144/72 - 159/83)  BP(mean): --  RR: 18 (06 Dec 2019 14:06) (16 - 18)  SpO2: 98% (06 Dec 2019 14:06) (96% - 98%) on RA             @ 07:01  -   @ 07:00  --------------------------------------------------------  IN: 196 mL / OUT: 1150 mL / NET: -954 mL          LABS:                        10.2   4.67  )-----------( 299      ( 06 Dec 2019 06:47 )             32.3         143  |  104  |  10  ----------------------------<  88  3.8   |  26  |  0.75    Ca    8.8      06 Dec 2019 06:42  Phos  3.3       Mg     2.1         TPro  6.5  /  Alb  3.7  /  TBili  0.3  /  DBili  x   /  AST  13  /  ALT  8<L>  /  AlkPhos  78            CAPILLARY BLOOD GLUCOSE        PT/INR - ( 06 Dec 2019 06:47 )   PT: 18.2 sec;   INR: 1.57 ratio         PTT - ( 06 Dec 2019 13:27 )  PTT:83.2 sec      Serum Pro-Brain Natriuretic Peptide: 276 pg/mL (19 @ 12:35)        Physical Examination:    General: No acute distress.      HEENT: Pupils equal, reactive to light.  Symmetric.    PULM: Clear to auscultation bilaterally, no significant sputum production    CVS: rrr    ABD: Soft, nondistended, nontender, normoactive bowel sounds, no masses    EXT: No edema, nontender    SKIN: Warm and well perfused, no rashes noted.    NEURO: Alert, oriented, interactive, nonfocal    RADIOLOGY REVIEWED  CXR: grossly clear

## 2019-12-06 NOTE — PROGRESS NOTE ADULT - SUBJECTIVE AND OBJECTIVE BOX
Felipe Wu  Internal Medicine PGY-1  Pager: [phone] / [phone]      PATIENT:  NAVYA VINES  [id number]    INTERVAL HISTORY/OVERNIGHT EVENTS:  NAEON    SUBJECTIVE:  LE edema improved since being started on heparin ggt. No sob. No subjective symptoms.     OBJECTIVE:    T(C): 36.7 (12-06-19 @ 04:26), Max: 36.8 (12-05-19 @ 16:14)  HR: 61 (12-06-19 @ 04:26) (50 - 69)  BP: 158/88 (12-06-19 @ 04:26) (144/72 - 159/83)  RR: 18 (12-06-19 @ 04:26) (16 - 18)  SpO2: 98% (12-06-19 @ 04:26) (96% - 98%)      12-05-19 @ 07:01  -  12-06-19 @ 07:00  --------------------------------------------------------  IN: 196 mL / OUT: 1150 mL / NET: -954 mL    12-06-19 @ 07:01  -  12-06-19 @ 12:41  --------------------------------------------------------  IN: 40 mL / OUT: 0 mL / NET: 40 mL      GENERAL: Alert. Not confused. No acute distress. Not thin. Not cachectic. +obese.  	HEAD:  Atraumatic. Normocephalic.  	EYES: EOMI. PERRLA. Normal conjunctiva/sclera.  	ENT: Neck supple. No JVD. Moist oral mucosa. Not edentulous. No thrush.  	LYMPH: Normal supraclavicular/cervical lymph nodes.   	CARDIAC: Not tachy, Not vladimir. Regular rhythm. Not irregularly irregular. S1. S2. No murmur. No rub. No distant heart sounds.  	LUNG/CHEST: Breath sounds not appreciated 2/2 body habitus.   	ABDOMEN: Soft. No tenderness. No distension. No fluid wave. Normal bowel sounds.  	BACK: No midline/vertebral tenderness. No flank tenderness.  	VASCULAR: +2 b/l radial or ulnar pulses. Palpable DP pulses. Good cap refill in BL LE  	EXTREMITIES:  No clubbing. No cyanosis. +2 LLE minimally pitting edema, +1 RLE nonpitting edema. Moving all 4.  	NEUROLOGY: A&Ox3. Non-focal exam. Cranial nerves intact. Normal speech. Sensation intact.  	PSYCH: Normal behavior. Normal affect.  	SKIN: No jaundice. No erythema. No rash/lesion.        LABS:                          10.2   4.67  )-----------( 299      ( 06 Dec 2019 06:47 )             32.3     12-06    143  |  104  |  10  ----------------------------<  88  3.8   |  26  |  0.75    Ca    8.8      06 Dec 2019 06:42  Phos  3.3     12-06  Mg     2.1     12-06    TPro  6.5  /  Alb  3.7  /  TBili  0.3  /  DBili  x   /  AST  13  /  ALT  8<L>  /  AlkPhos  78  12-06    LIVER FUNCTIONS - ( 06 Dec 2019 06:42 )  Alb: 3.7 g/dL / Pro: 6.5 g/dL / ALK PHOS: 78 U/L / ALT: 8 U/L / AST: 13 U/L / GGT: x             PT/INR - ( 06 Dec 2019 06:47 )   PT: 18.2 sec;   INR: 1.57 ratio         PTT - ( 06 Dec 2019 06:47 )  PTT:60.7 sec                    IMAGING:      MEDICATIONS:  MEDICATIONS  (STANDING):  heparin  Infusion.  Unit(s)/Hr (24 mL/Hr) IV Continuous <Continuous>    MEDICATIONS  (PRN):  acetaminophen   Tablet .. 650 milliGRAM(s) Oral every 6 hours PRN Mild Pain (1 - 3)  heparin  Injectable 80766 Unit(s) IV Push every 6 hours PRN For aPTT less than 40  heparin  Injectable 5000 Unit(s) IV Push every 6 hours PRN For aPTT between 40 - 57

## 2019-12-06 NOTE — CONSULT NOTE ADULT - ASSESSMENT
58 y/o M with PMH of obesity, provoked DVT (~4 yrs ago), recently diagnosed LLE DVT and extensive bilateral PE ( at [facility]) on Xarelto now presents with worsened LLE edema, new since discharge. Found to have extension of LLE DVT concerning for Xarelto failure. Patient endorses taking all doses of Xarelto as prescribed. Patient reports being up to date on malignancy screening, colonoscopy 1 year ago was normal per patient.     Hypercoagulable w/u at [facility]: negative factor V Leiden, low AT III, normal protein C, homocysteine.   Risk factors: recent prolonged stasis (frequent drives to [place]), +family history-father  of VTE 58 y/o M with PMH of obesity, provoked DVT (~4 yrs ago), recently diagnosed LLE DVT and extensive bilateral PE (11/24 at [facility]) on Xarelto now presents with worsened LLE edema, new since discharge. Found to have extension of LLE DVT concerning for Xarelto failure. Patient endorses taking all doses of Xarelto as prescribed. Patient reports being up to date on malignancy screening, colonoscopy 1 year ago was normal per patient.     Hypercoagulable w/u at [facility]: negative factor V Leiden, low AT III, normal protein C, homocysteine.   Risk factors: recent prolonged stasis (frequent 13h drives to [place]), +family history

## 2019-12-06 NOTE — CONSULT NOTE ADULT - PROBLEM SELECTOR RECOMMENDATION 9
Provoked VTE  -Extension of LLE DVT (previously only L popliteal DVT at [facility])  -Concern for Xarelto failure  -Now on heparin to coumadin bridge   -Would complete remaining hypercoagulable w/u not done at [facility] Likely Provoked VTE  -Extension of LLE DVT (previously only L popliteal DVT at [facility])  -Concern for Xarelto failure  -Now on heparin to coumadin bridge   -Would complete remaining hypercoagulable w/u not done at [facility]

## 2019-12-06 NOTE — CONSULT NOTE ADULT - PROBLEM SELECTOR RECOMMENDATION 2
Read from [facility]: bilateral PE, extensive clot burden within R main pulmonary artery  -Heparin gtt to coumadin bridge  -TTE from [facility] is cut off but pulm note says no evidence of RV systolic dysfunction

## 2019-12-06 NOTE — CONSULT NOTE ADULT - ATTENDING COMMENTS
Pt has history of provoked DVT years ago and now with PE/ DVT that has progressed on Xarelto. He states he was compliant with Xarelto therapy. Of note, he weighs 150 kg with history of gastric banding/ bypass. He is up to date with colonoscopy last year without any clear blood in stool. On exam, BLE edema without calor. Lungs clear to ausculation. No areas of large bruising or hematoma noted. We reviewed optimal regimen given present weight would be warfarin where INR would be monitored for goal of 2 to 3. He is agreeable to treatment. For iron deficiency due to gastric bypass: would supplement with iron: he admits he has not been good with medical follow up for iron deficiency or B12 deficiency which he has had prior diagnosis of. Would supplement with oral ferrous sulfate and check B12 level.
Continue heparin to coumadin  obtain cta disc from [place] (wife to do)  fu thrombophilia lucio

## 2019-12-06 NOTE — CONSULT NOTE ADULT - SUBJECTIVE AND OBJECTIVE BOX
HPI:  59M c hx obesity, provoked DVT (~4 yrs ago), recently diagnosed LLE DVT c/b b/l PE (dx'd 2 weeks ago @ [facility]) on xarelto, pw worsening LLE swelling and pain, found to have extension of DVT.    Per chart, patient had DVT 4 years ago and he believed it was provoked after a plane flight from [place], and that he was treated for approximately 6 months. Pt has otherwise been well since then. Pt reports frequent 13 hour car rides to [place], where he his job takes him. Most recently, pt reports coming back from [place], then feeling SOB, which led him to [facility] and the above diagnoses. Pt reports his SOB is now much better. Several days ago, pt reports trying on a pair of shoes that couldn't fit. Pt also reports taking percocet at home for the pain in his leg, which brings him into the hospital.     FHx: Pt reports his father passed away at age around 66 2/2 ?leg clot.  US here showed an acute above the knee L sided DVT involving the distal segment of the L femoral vein, L popliteal vein, L TP trunk and L gastrocnemius vein as well as superficial thrombophlebitis involving L GSV.      PAST MEDICAL & SURGICAL HISTORY:  DVT (deep venous thrombosis): LLE, 11/2019, on Xarelto, provoked after 13 hour car ride  Pulmonary embolism: b/l, 11/2019, on Xarelto  No significant past surgical history    MEDICATIONS  (STANDING):  heparin  Infusion.  Unit(s)/Hr (24 mL/Hr) IV Continuous <Continuous>    MEDICATIONS  (PRN):  acetaminophen   Tablet .. 650 milliGRAM(s) Oral every 6 hours PRN Mild Pain (1 - 3)  heparin  Injectable 31086 Unit(s) IV Push every 6 hours PRN For aPTT less than 40  heparin  Injectable 5000 Unit(s) IV Push every 6 hours PRN For aPTT between 40 - 57      Allergies    No Known Allergies    Intolerances        SOCIAL HISTORY:    FAMILY HISTORY:  FH: kidney disease  Family history of DVT      Vital Signs Last 24 Hrs  T(C): 36.7 (06 Dec 2019 04:26), Max: 36.8 (05 Dec 2019 16:14)  T(F): 98 (06 Dec 2019 04:26), Max: 98.3 (05 Dec 2019 16:14)  HR: 61 (06 Dec 2019 04:26) (50 - 69)  BP: 158/88 (06 Dec 2019 04:26) (144/72 - 159/83)  BP(mean): --  RR: 18 (06 Dec 2019 04:26) (16 - 18)  SpO2: 98% (06 Dec 2019 04:26) (96% - 98%)    PHYSICAL EXAM:    GENERAL: NAD, AAOx3   HEAD:  NC/AT  EYES: EOMI, PERRLA, no scleral icterus  HEENT: Moist mucous membranes  LUNG: Clear to auscultation bilaterally; No rales, rhonchi, wheezing, or rubs  HEART: RRR; No murmurs, rubs, or gallops  ABDOMEN: +BS, ST/ND/NT  EXTREMITIES:  2+ Peripheral Pulses, No clubbing, cyanosis, or edema  LAD: no palpable adenopathy    LABS:                        10.2   4.67  )-----------( 299      ( 06 Dec 2019 06:47 )             32.3     12-06    143  |  104  |  10  ----------------------------<  88  3.8   |  26  |  0.75    Ca    8.8      06 Dec 2019 06:42  Phos  3.3     12-06  Mg     2.1     12-06    TPro  6.5  /  Alb  3.7  /  TBili  0.3  /  DBili  x   /  AST  13  /  ALT  8<L>  /  AlkPhos  78  12-06    PT/INR - ( 06 Dec 2019 06:47 )   PT: 18.2 sec;   INR: 1.57 ratio         PTT - ( 06 Dec 2019 06:47 )  PTT:60.7 sec    Reticulocyte Percent: 1.1 % (12-06 @ 06:47)        RADIOLOGY & ADDITIONAL STUDIES:

## 2019-12-06 NOTE — PROGRESS NOTE ADULT - PROBLEM SELECTOR PLAN 4
- ekg nonischemic, also first degree heart block  - pt asymptomatic  - check tsh - ekg nonischemic, also first degree heart block  - pt asymptomatic  - TSH mildly elevated to 4.72

## 2019-12-06 NOTE — PROGRESS NOTE ADULT - PROBLEM SELECTOR PLAN 2
- pt reports improvement in SOB  - cont a/c as above - pt reports improvement in SOB since PE was diagnosed 2 weeks ago.   - cont a/c as above

## 2019-12-06 NOTE — PROGRESS NOTE ADULT - ATTENDING COMMENTS
Pt seen and examined. Pt with worsening LLE DVT s/p failed Xarelto treatment. Started on heparin gtt with improvement in LLE swelling. Given the failure will likely need IV heparin bridge to coumadin. Pt in agreement but wants heme inputs. Care discussed with uZlma Tobin (pulm) as well and updated him of the clinical course    Catrina Casillas MD  Division of Hospital Medicine  Cell: [phone]  Pager: [phone]  Office: [phone]

## 2019-12-07 LAB
ANION GAP SERPL CALC-SCNC: 11 MMOL/L — SIGNIFICANT CHANGE UP (ref 5–17)
APTT BLD: 78 SEC — HIGH (ref 27.5–36.3)
BUN SERPL-MCNC: 12 MG/DL — SIGNIFICANT CHANGE UP (ref 7–23)
CALCIUM SERPL-MCNC: 9 MG/DL — SIGNIFICANT CHANGE UP (ref 8.4–10.5)
CHLORIDE SERPL-SCNC: 105 MMOL/L — SIGNIFICANT CHANGE UP (ref 96–108)
CO2 SERPL-SCNC: 26 MMOL/L — SIGNIFICANT CHANGE UP (ref 22–31)
CREAT SERPL-MCNC: 0.76 MG/DL — SIGNIFICANT CHANGE UP (ref 0.5–1.3)
GLUCOSE SERPL-MCNC: 102 MG/DL — HIGH (ref 70–99)
HCT VFR BLD CALC: 33.4 % — LOW (ref 39–50)
HGB BLD-MCNC: 10.4 G/DL — LOW (ref 13–17)
INR BLD: 1.53 RATIO — HIGH (ref 0.88–1.16)
MAGNESIUM SERPL-MCNC: 2.1 MG/DL — SIGNIFICANT CHANGE UP (ref 1.6–2.6)
MCHC RBC-ENTMCNC: 25.6 PG — LOW (ref 27–34)
MCHC RBC-ENTMCNC: 31.1 GM/DL — LOW (ref 32–36)
MCV RBC AUTO: 82.1 FL — SIGNIFICANT CHANGE UP (ref 80–100)
NRBC # BLD: 0 /100 WBCS — SIGNIFICANT CHANGE UP (ref 0–0)
PHOSPHATE SERPL-MCNC: 3.2 MG/DL — SIGNIFICANT CHANGE UP (ref 2.5–4.5)
PLATELET # BLD AUTO: 297 K/UL — SIGNIFICANT CHANGE UP (ref 150–400)
POTASSIUM SERPL-MCNC: 3.9 MMOL/L — SIGNIFICANT CHANGE UP (ref 3.5–5.3)
POTASSIUM SERPL-SCNC: 3.9 MMOL/L — SIGNIFICANT CHANGE UP (ref 3.5–5.3)
PROTHROM AB SERPL-ACNC: 17.8 SEC — HIGH (ref 10–12.9)
RBC # BLD: 4.07 M/UL — LOW (ref 4.2–5.8)
RBC # FLD: 15.2 % — HIGH (ref 10.3–14.5)
SODIUM SERPL-SCNC: 142 MMOL/L — SIGNIFICANT CHANGE UP (ref 135–145)
WBC # BLD: 4.58 K/UL — SIGNIFICANT CHANGE UP (ref 3.8–10.5)
WBC # FLD AUTO: 4.58 K/UL — SIGNIFICANT CHANGE UP (ref 3.8–10.5)

## 2019-12-07 PROCEDURE — 74176 CT ABD & PELVIS W/O CONTRAST: CPT | Mod: 26

## 2019-12-07 PROCEDURE — 99233 SBSQ HOSP IP/OBS HIGH 50: CPT | Mod: GC

## 2019-12-07 RX ORDER — PREGABALIN 225 MG/1
1000 CAPSULE ORAL DAILY
Refills: 0 | Status: DISCONTINUED | OUTPATIENT
Start: 2019-12-07 | End: 2019-12-10

## 2019-12-07 RX ORDER — FERROUS SULFATE 325(65) MG
325 TABLET ORAL DAILY
Refills: 0 | Status: DISCONTINUED | OUTPATIENT
Start: 2019-12-07 | End: 2019-12-08

## 2019-12-07 RX ORDER — WARFARIN SODIUM 2.5 MG/1
5 TABLET ORAL ONCE
Refills: 0 | Status: COMPLETED | OUTPATIENT
Start: 2019-12-07 | End: 2019-12-07

## 2019-12-07 RX ADMIN — Medication 325 MILLIGRAM(S): at 12:58

## 2019-12-07 RX ADMIN — HEPARIN SODIUM 2000 UNIT(S)/HR: 5000 INJECTION INTRAVENOUS; SUBCUTANEOUS at 08:19

## 2019-12-07 RX ADMIN — PREGABALIN 1000 MICROGRAM(S): 225 CAPSULE ORAL at 12:58

## 2019-12-07 NOTE — PROGRESS NOTE ADULT - PROBLEM SELECTOR PLAN 2
Read from [facility]: bilateral PE, extensive clot burden within R main pulmonary artery  -Heparin gtt to coumadin bridge  -TTE from [facility] is cut off but pulm note says no evidence of RV systolic dysfunction.   -Pt's wife to bring in CTA disc from [facility]

## 2019-12-07 NOTE — PROGRESS NOTE ADULT - ASSESSMENT
59M c hx obesity, provoked DVT (~4 yrs ago), recently diagnosed LLE DVT c/b b/l PE (dx'd 2 weeks ago @ [facility]) on xarelto, pw extension of LLE DVT, poss failure of xarelto, currently on heparin gtt bridging  to Coumadin

## 2019-12-07 NOTE — PROGRESS NOTE ADULT - SUBJECTIVE AND OBJECTIVE BOX
Follow-up Pulm Progress Note    No new respiratory events overnight.  Denies SOB/CP.   Sats 96% RA    Medications:  MEDICATIONS  (STANDING):  ferrous    sulfate 325 milliGRAM(s) Oral daily  heparin  Infusion.  Unit(s)/Hr (24 mL/Hr) IV Continuous <Continuous>    MEDICATIONS  (PRN):  acetaminophen   Tablet .. 650 milliGRAM(s) Oral every 6 hours PRN Mild Pain (1 - 3)  heparin  Injectable 02100 Unit(s) IV Push every 6 hours PRN For aPTT less than 40  heparin  Injectable 5000 Unit(s) IV Push every 6 hours PRN For aPTT between 40 - 57          Vital Signs Last 24 Hrs  T(C): 36.7 (07 Dec 2019 04:44), Max: 36.9 (06 Dec 2019 14:06)  T(F): 98 (07 Dec 2019 04:44), Max: 98.4 (06 Dec 2019 14:06)  HR: 57 (07 Dec 2019 04:44) (57 - 69)  BP: 146/75 (07 Dec 2019 04:44) (146/75 - 148/90)  BP(mean): --  RR: 18 (07 Dec 2019 04:44) (18 - 19)  SpO2: 94% (07 Dec 2019 04:44) (94% - 98%)          12-06 @ 07:01  -  12-07 @ 07:00  --------------------------------------------------------  IN: 210 mL / OUT: 350 mL / NET: -140 mL          LABS:                        10.4   4.58  )-----------( 297      ( 07 Dec 2019 07:24 )             33.4     12-07    142  |  105  |  12  ----------------------------<  102<H>  3.9   |  26  |  0.76    Ca    9.0      07 Dec 2019 07:16  Phos  3.2     12-07  Mg     2.1     12-07    TPro  6.5  /  Alb  3.7  /  TBili  0.3  /  DBili  x   /  AST  13  /  ALT  8<L>  /  AlkPhos  78  12-06              PT/INR - ( 07 Dec 2019 07:24 )   PT: 17.8 sec;   INR: 1.53 ratio         PTT - ( 07 Dec 2019 07:24 )  PTT:78.0 sec      Serum Pro-Brain Natriuretic Peptide: 276 pg/mL (12-05-19 @ 12:35)                Physical Examination:  PULM: Clear to auscultation bilaterally, no significant sputum production  CVS: S1, S2 heard    RADIOLOGY REVIEWED  CXR: grossly clear

## 2019-12-07 NOTE — PROGRESS NOTE ADULT - PROBLEM SELECTOR PLAN 3
- anemia labs c/w some component of DONNELL  - c/w Fe and B12 supplement   - pt denies bleeding anywhere  - f/u with pmd for further anemia workup

## 2019-12-07 NOTE — PROGRESS NOTE ADULT - PROBLEM SELECTOR PLAN 1
Likely Provoked VTE  -Extension of LLE DVT (previously only L popliteal DVT at [facility])  -Concern for Xarelto failure  -Now on heparin to coumadin bridge   -Would complete remaining hypercoagulable w/u not done at [facility]. Hematology f/u. Likely Provoked VTE  -Extension of LLE DVT (previously only L popliteal DVT at [facility])  -Concern for Xarelto failure  -Now on heparin to coumadin bridge   -fu remaing thrombophilia lucio

## 2019-12-07 NOTE — PROGRESS NOTE ADULT - SUBJECTIVE AND OBJECTIVE BOX
Charly Galan, PGY2  Pager: [phone] ([facility]), [phone] ([facility])   After 7: Night Float pager    Patient is a 59y old  Male who presents with a chief complaint of left leg swelling and pain (06 Dec 2019 14:35)      SUBJECTIVE / OVERNIGHT EVENTS:    MEDICATIONS  (STANDING):  heparin  Infusion.  Unit(s)/Hr (24 mL/Hr) IV Continuous <Continuous>    MEDICATIONS  (PRN):  acetaminophen   Tablet .. 650 milliGRAM(s) Oral every 6 hours PRN Mild Pain (1 - 3)  heparin  Injectable 07831 Unit(s) IV Push every 6 hours PRN For aPTT less than 40  heparin  Injectable 5000 Unit(s) IV Push every 6 hours PRN For aPTT between 40 - 57      Vital Signs Last 24 Hrs  T(C): 36.7 (07 Dec 2019 04:44), Max: 36.9 (06 Dec 2019 14:06)  T(F): 98 (07 Dec 2019 04:44), Max: 98.4 (06 Dec 2019 14:06)  HR: 57 (07 Dec 2019 04:44) (57 - 69)  BP: 146/75 (07 Dec 2019 04:44) (146/75 - 148/90)  BP(mean): --  RR: 18 (07 Dec 2019 04:44) (18 - 19)  SpO2: 94% (07 Dec 2019 04:44) (94% - 98%)  CAPILLARY BLOOD GLUCOSE        I&O's Summary    06 Dec 2019 07:01  -  07 Dec 2019 07:00  --------------------------------------------------------  IN: 210 mL / OUT: 350 mL / NET: -140 mL        PHYSICAL EXAM:  GENERAL: NAD, well-developed  EYES: EOMI, PERRLA, conjunctiva and sclera clear  NECK:  No JVD  CHEST/LUNG: CTABL ; No wheeze  HEART: RRR; No murmurs  ABDOMEN: Soft, Nontender, Nondistended; Bowel sounds present  EXTREMITIES:  2+ Peripheral Pulses, No edema  MUSCULOSKEL:   PSYCH: AAOx   NEUROLOGY: CN 2-12 grossly intact, strength, sensory,   SKIN: No rashes or lesions. No sacral ulcer    LABS:                        10.2   4.67  )-----------( 299      ( 06 Dec 2019 06:47 )             32.3     12-06    143  |  104  |  10  ----------------------------<  88  3.8   |  26  |  0.75    Ca    8.8      06 Dec 2019 06:42  Phos  3.3     12-06  Mg     2.1     12-06    TPro  6.5  /  Alb  3.7  /  TBili  0.3  /  DBili  x   /  AST  13  /  ALT  8<L>  /  AlkPhos  78  12-06    PT/INR - ( 06 Dec 2019 06:47 )   PT: 18.2 sec;   INR: 1.57 ratio         PTT - ( 06 Dec 2019 13:27 )  PTT:83.2 sec          Microbiology;      RADIOLOGY & ADDITIONAL TESTS:    Imaging Personally Reviewed:    Consultant(s) Notes Reviewed: Charly Galan, PGY2  Pager: [phone] ([facility]), [phone] ([facility])   After 7: Night Float pager    Patient is a 59y old  Male who presents with a chief complaint of left leg swelling and pain (06 Dec 2019 14:35)      SUBJECTIVE / OVERNIGHT EVENTS: no acute event overnight, pt was seen and examined in AM,  denies fever, chills, SOB, CP, n/v, diarrhea/constipation. breathing well on RA      MEDICATIONS  (STANDING):  heparin  Infusion.  Unit(s)/Hr (24 mL/Hr) IV Continuous <Continuous>    MEDICATIONS  (PRN):  acetaminophen   Tablet .. 650 milliGRAM(s) Oral every 6 hours PRN Mild Pain (1 - 3)  heparin  Injectable 64018 Unit(s) IV Push every 6 hours PRN For aPTT less than 40  heparin  Injectable 5000 Unit(s) IV Push every 6 hours PRN For aPTT between 40 - 57      Vital Signs Last 24 Hrs  T(C): 36.7 (07 Dec 2019 04:44), Max: 36.9 (06 Dec 2019 14:06)  T(F): 98 (07 Dec 2019 04:44), Max: 98.4 (06 Dec 2019 14:06)  HR: 57 (07 Dec 2019 04:44) (57 - 69)  BP: 146/75 (07 Dec 2019 04:44) (146/75 - 148/90)  BP(mean): --  RR: 18 (07 Dec 2019 04:44) (18 - 19)  SpO2: 94% (07 Dec 2019 04:44) (94% - 98%)  CAPILLARY BLOOD GLUCOSE        I&O's Summary    06 Dec 2019 07:01  -  07 Dec 2019 07:00  --------------------------------------------------------  IN: 210 mL / OUT: 350 mL / NET: -140 mL        PHYSICAL EXAM:  GENERAL: NAD, obese   EYES: EOMI, PERRLA, conjunctiva and sclera clear  NECK:  No JVD  CHEST/LUNG: CTABL ; No wheeze  HEART: NSR, no murmur   ABDOMEN: Soft, Nontender, Nondistended; Bowel sounds present  EXTREMITIES:  2+ Peripheral Pulses,  + LLE erythema and swelling    PSYCH: AAOx 3   NEUROLOGY: no focal deficit   SKIN: No rashes or lesions. No sacral ulcer    LABS:                        10.2   4.67  )-----------( 299      ( 06 Dec 2019 06:47 )             32.3     12-06    143  |  104  |  10  ----------------------------<  88  3.8   |  26  |  0.75    Ca    8.8      06 Dec 2019 06:42  Phos  3.3     12-06  Mg     2.1     12-06    TPro  6.5  /  Alb  3.7  /  TBili  0.3  /  DBili  x   /  AST  13  /  ALT  8<L>  /  AlkPhos  78  12-06    PT/INR - ( 06 Dec 2019 06:47 )   PT: 18.2 sec;   INR: 1.57 ratio         PTT - ( 06 Dec 2019 13:27 )  PTT:83.2 sec        RADIOLOGY & ADDITIONAL TESTS:  < from: [facility] Duplex Lower Ext Vein Scan, Left (12.05.19 @ 15:53) >  IMPRESSION:     There is acute above-the-knee left-sided deep venous thrombosis involving   the distal segment of the left femoral vein, left popliteal vein, left   tibioperoneal trunk and left gastrocnemius vein.    Superficial thrombophlebitis involving the left greater saphenous vein as   described above.    RACHEL Person was informed.      < end of copied text >        Consultant(s) Notes Reviewed:  Pulmonology

## 2019-12-07 NOTE — PROGRESS NOTE ADULT - ASSESSMENT
60 y/o M with PMH of obesity, provoked DVT (~4 yrs ago), recently diagnosed LLE DVT and extensive bilateral PE (11/24 at [facility]) on Xarelto now presents with worsened LLE edema, new since discharge. Found to have extension of LLE DVT concerning for Xarelto failure. Patient endorses taking all doses of Xarelto as prescribed. Patient reports being up to date on malignancy screening, colonoscopy 1 year ago was normal per patient.     Hypercoagulable w/u at [facility]: negative factor V Leiden, low AT III, normal protein C, homocysteine.   Risk factors: recent prolonged stasis (frequent 13h drives to [place]), +family history

## 2019-12-07 NOTE — PROGRESS NOTE ADULT - ATTENDING COMMENTS
Pt seen and examined. Pt with recent dx of DVT/PE started on xarelto, pw worsening LE edema likely in setting of failed xarelto treatment. Started on heparin gtt with bridging to coumadin. Continue coumadin, INR check    Catrina Casillas MD  Division of Hospital Medicine  Cell: [phone]  Pager: [phone]  Office: [phone]

## 2019-12-07 NOTE — PROGRESS NOTE ADULT - PROBLEM SELECTOR PLAN 1
- per history, appears to be pt's 2nd lifetime provoked DVT. Reports compliance with Xarelto  - per ED notes, comparison with duplex report from [facility] shows extension of LLE DVT, concerning for failure of xarelto especially given patient's large body habitus.  - hematology consult appreciated: lupus anticoagulant panel negative, pending beta-2 glycoprotein, anti- cardiolipin, CT A/P ordered for malignancy screen   - c/w heparin gtt, daily Coumadin dosing

## 2019-12-08 ENCOUNTER — TRANSCRIPTION ENCOUNTER (OUTPATIENT)
Age: 59
End: 2019-12-08

## 2019-12-08 LAB
ANION GAP SERPL CALC-SCNC: 12 MMOL/L — SIGNIFICANT CHANGE UP (ref 5–17)
APTT BLD: 83.7 SEC — HIGH (ref 27.5–36.3)
BUN SERPL-MCNC: 11 MG/DL — SIGNIFICANT CHANGE UP (ref 7–23)
CALCIUM SERPL-MCNC: 9.2 MG/DL — SIGNIFICANT CHANGE UP (ref 8.4–10.5)
CHLORIDE SERPL-SCNC: 102 MMOL/L — SIGNIFICANT CHANGE UP (ref 96–108)
CO2 SERPL-SCNC: 26 MMOL/L — SIGNIFICANT CHANGE UP (ref 22–31)
CREAT SERPL-MCNC: 0.73 MG/DL — SIGNIFICANT CHANGE UP (ref 0.5–1.3)
GLUCOSE SERPL-MCNC: 101 MG/DL — HIGH (ref 70–99)
HCT VFR BLD CALC: 34.3 % — LOW (ref 39–50)
HGB BLD-MCNC: 10.4 G/DL — LOW (ref 13–17)
INR BLD: 1.39 RATIO — HIGH (ref 0.88–1.16)
MCHC RBC-ENTMCNC: 25.4 PG — LOW (ref 27–34)
MCHC RBC-ENTMCNC: 30.3 GM/DL — LOW (ref 32–36)
MCV RBC AUTO: 83.9 FL — SIGNIFICANT CHANGE UP (ref 80–100)
NRBC # BLD: 0 /100 WBCS — SIGNIFICANT CHANGE UP (ref 0–0)
PLATELET # BLD AUTO: 280 K/UL — SIGNIFICANT CHANGE UP (ref 150–400)
POTASSIUM SERPL-MCNC: 4 MMOL/L — SIGNIFICANT CHANGE UP (ref 3.5–5.3)
POTASSIUM SERPL-SCNC: 4 MMOL/L — SIGNIFICANT CHANGE UP (ref 3.5–5.3)
PROTHROM AB SERPL-ACNC: 16.1 SEC — HIGH (ref 10–12.9)
RBC # BLD: 4.09 M/UL — LOW (ref 4.2–5.8)
RBC # FLD: 15.2 % — HIGH (ref 10.3–14.5)
SODIUM SERPL-SCNC: 140 MMOL/L — SIGNIFICANT CHANGE UP (ref 135–145)
WBC # BLD: 4.43 K/UL — SIGNIFICANT CHANGE UP (ref 3.8–10.5)
WBC # FLD AUTO: 4.43 K/UL — SIGNIFICANT CHANGE UP (ref 3.8–10.5)

## 2019-12-08 PROCEDURE — 93971 EXTREMITY STUDY: CPT | Mod: 26

## 2019-12-08 PROCEDURE — 99233 SBSQ HOSP IP/OBS HIGH 50: CPT | Mod: GC

## 2019-12-08 RX ORDER — FERROUS SULFATE 325(65) MG
325 TABLET ORAL
Refills: 0 | Status: DISCONTINUED | OUTPATIENT
Start: 2019-12-08 | End: 2019-12-10

## 2019-12-08 RX ORDER — POLYETHYLENE GLYCOL 3350 17 G/17G
17 POWDER, FOR SOLUTION ORAL DAILY
Refills: 0 | Status: DISCONTINUED | OUTPATIENT
Start: 2019-12-08 | End: 2019-12-10

## 2019-12-08 RX ORDER — WARFARIN SODIUM 2.5 MG/1
7.5 TABLET ORAL ONCE
Refills: 0 | Status: COMPLETED | OUTPATIENT
Start: 2019-12-08 | End: 2019-12-08

## 2019-12-08 RX ORDER — SENNA PLUS 8.6 MG/1
2 TABLET ORAL AT BEDTIME
Refills: 0 | Status: DISCONTINUED | OUTPATIENT
Start: 2019-12-08 | End: 2019-12-10

## 2019-12-08 RX ORDER — ASCORBIC ACID 60 MG
500 TABLET,CHEWABLE ORAL
Refills: 0 | Status: DISCONTINUED | OUTPATIENT
Start: 2019-12-08 | End: 2019-12-10

## 2019-12-08 RX ADMIN — HEPARIN SODIUM 2000 UNIT(S)/HR: 5000 INJECTION INTRAVENOUS; SUBCUTANEOUS at 10:45

## 2019-12-08 RX ADMIN — SENNA PLUS 2 TABLET(S): 8.6 TABLET ORAL at 21:14

## 2019-12-08 RX ADMIN — Medication 325 MILLIGRAM(S): at 18:25

## 2019-12-08 RX ADMIN — Medication 500 MILLIGRAM(S): at 18:26

## 2019-12-08 RX ADMIN — WARFARIN SODIUM 7.5 MILLIGRAM(S): 2.5 TABLET ORAL at 21:13

## 2019-12-08 RX ADMIN — WARFARIN SODIUM 5 MILLIGRAM(S): 2.5 TABLET ORAL at 00:00

## 2019-12-08 RX ADMIN — PREGABALIN 1000 MICROGRAM(S): 225 CAPSULE ORAL at 14:26

## 2019-12-08 NOTE — DISCHARGE NOTE PROVIDER - NSDCCPCAREPLAN_GEN_ALL_CORE_FT
PRINCIPAL DISCHARGE DIAGNOSIS  Diagnosis: Acute deep vein thrombosis (DVT) of other specified vein of left lower extremity  Assessment and Plan of Treatment: You were admitted to the hospital with a worsening of your known DVT (blood clot in your leg) while on xarelto. It is likely that you failed treatment with xarelto because there is standardized dosing for xarelto and it may not be as effective in larger individuals. We started you on a heparin drip to thin your blood immediately, and we started a medication called warfarin (aka coumadin). When you were in the therapeutic range for warfarin for 48 hours your heparin drip was discontinued and you were discharged home. We also obtained an ultrasound of your right leg and there was no evidence of DVT there. We also obtained a CT scan of your abdomen and pelvis which was negative for any kind of pathology. PRINCIPAL DISCHARGE DIAGNOSIS  Diagnosis: Acute deep vein thrombosis (DVT) of other specified vein of left lower extremity  Assessment and Plan of Treatment: You were admitted to the hospital with a worsening of your known DVT (blood clot in your leg) while on xarelto. It is likely that you failed treatment with xarelto because there is standardized dosing for xarelto and it may not be as effective in larger individuals. We started you on a heparin drip to thin your blood immediately, and we started a medication called warfarin (aka coumadin). When you were in the therapeutic range for warfarin your heparin drip was discontinued and you were discharged home. We also obtained an ultrasound of your right leg and there was no evidence of DVT there. We also obtained a CT scan of your abdomen and pelvis which was negative for any kind of pathology. Please take 5mg of warfarin every night after discharge. Please follow-up with your PCP, Dr. Kennedy, on Friday at 10:30AM for an appointment and to check your INR. Please also follow-up with a hematologist to further work up any possible hyper-coagulable disorder.

## 2019-12-08 NOTE — PROGRESS NOTE ADULT - PROBLEM SELECTOR PLAN 1
- per history, appears to be pt's 2nd lifetime provoked DVT. Reports compliance with Xarelto  - per ED notes, comparison with duplex report from [facility] shows extension of LLE DVT, concerning for failure of xarelto especially given patient's large body habitus.  - hematology consult appreciated: lupus anticoagulant panel negative, pending beta-2 glycoprotein, anti- cardiolipin, CT A/P ordered for malignancy screen   - c/w heparin gtt, daily Coumadin dosing

## 2019-12-08 NOTE — DISCHARGE NOTE PROVIDER - CARE PROVIDER_API CALL
Jones Kennedy (MD)  Cardiovascular Disease; Internal Medicine  [address] [place] [address]  Phone: [phone]  Fax: [phone]  Established Patient  Scheduled Appointment: 12/13/2019 10:30 AM

## 2019-12-08 NOTE — PROGRESS NOTE ADULT - ASSESSMENT
59M c hx obesity, provoked DVT (~4 yrs ago), recently diagnosed LLE DVT c/b b/l PE (dx'd 2 weeks ago @ [facility]) on xarelto, pw extension of LLE DVT, poss failure of xarelto, currently on heparin gtt bridging  to Coumadin

## 2019-12-08 NOTE — DISCHARGE NOTE PROVIDER - NSDCFUADDINST_GEN_ALL_CORE_FT
Alcohol - Alcohol intake can affect how the body metabolizes warfarin. Patients undergoing warfarin therapy should avoid drinking alcohol on a daily basis. Alcohol should be limited to no more than 1 to 2 servings of alcohol occasionally. This means an average of one to two drinks per day for men and one drink per day for women. (A drink is one 12 oz. beer, 4 oz. of wine, 1/5 oz. of 80-proof spirits, or 1 oz. of 100-proof spirits). The antiplatelet effect of alcohol increases the risk of major bleeding, even if the INR remains within the target range.    Foods - Some foods can interfere with the effectiveness of warfarin. The most important point to remember is to eat what you normally eat and not to make any major changes in your diet without contacting your healthcare provider.    Vitamin K - Eating an increased amount of foods rich in vitamin K can lower the PT and INR, making warfarin less effective and potentially increasing the risk of blood clots. Patients who take warfarin should aim to eat a relatively similar amount of vitamin K each week. The highest amount of vitamin K is found in green and leafy vegetables such as broccoli, lettuce, and spinach. It is not necessary to avoid these foods; however, it is important to try to keep the amount of vitamin K you eat consistent.

## 2019-12-08 NOTE — DISCHARGE NOTE PROVIDER - PROVIDER TOKENS
PROVIDER:[TOKEN:[[id number]],SCHEDULEDAPPT:[12/13/2019],SCHEDULEDAPPTTIME:[10:30 AM],ESTABLISHEDPATIENT:[T]]

## 2019-12-08 NOTE — PROGRESS NOTE ADULT - SUBJECTIVE AND OBJECTIVE BOX
Felipe Wu  Internal Medicine PGY-1  Pager: [phone] / [phone]      PATIENT:  NAVYA VINES  [id number]    INTERVAL HISTORY/OVERNIGHT EVENTS:  NAEON    SUBJECTIVE:  Pt with LE swelling improving. No SOB, no CP.    OBJECTIVE:    T(C): 36.5 (12-08-19 @ 04:59), Max: 36.7 (12-07-19 @ 13:21)  HR: 51 (12-08-19 @ 04:59) (51 - 67)  BP: 127/76 (12-08-19 @ 04:59) (127/76 - 152/76)  RR: 18 (12-08-19 @ 04:59) (18 - 18)  SpO2: 97% (12-08-19 @ 04:59) (96% - 99%)      12-07-19 @ 07:01  -  12-08-19 @ 07:00  --------------------------------------------------------  IN: 240 mL / OUT: 850 mL / NET: -610 mL          GENERAL: NAD, obese   EYES: EOMI, PERRLA, conjunctiva and sclera clear  NECK:  No JVD  CHEST/LUNG: CTABL ; No wheeze  HEART: NSR, no murmur   ABDOMEN: Soft, Nontender, Nondistended; Bowel sounds present  EXTREMITIES:  2+ Peripheral Pulses,  + LLE erythema and swelling. Improved from previous days.   PSYCH: AAOx 3   NEUROLOGY: no focal deficit   SKIN: No rashes or lesions. No sacral ulcer      LABS:                          10.4   4.43  )-----------( 280      ( 08 Dec 2019 07:23 )             34.3     12-08    140  |  102  |  11  ----------------------------<  101<H>  4.0   |  26  |  0.73    Ca    9.2      08 Dec 2019 07:23  Phos  3.2     12-07  Mg     2.1     12-07          PT/INR - ( 07 Dec 2019 07:24 )   PT: 17.8 sec;   INR: 1.53 ratio         PTT - ( 08 Dec 2019 09:01 )  PTT:83.7 sec                    IMAGING:      MEDICATIONS:  MEDICATIONS  (STANDING):  cyanocobalamin 1000 MICROGram(s) Oral daily  ferrous    sulfate 325 milliGRAM(s) Oral daily  heparin  Infusion.  Unit(s)/Hr (24 mL/Hr) IV Continuous <Continuous>    MEDICATIONS  (PRN):  acetaminophen   Tablet .. 650 milliGRAM(s) Oral every 6 hours PRN Mild Pain (1 - 3)  heparin  Injectable 28437 Unit(s) IV Push every 6 hours PRN For aPTT less than 40  heparin  Injectable 5000 Unit(s) IV Push every 6 hours PRN For aPTT between 40 - 57

## 2019-12-08 NOTE — DISCHARGE NOTE PROVIDER - NSDCMRMEDTOKEN_GEN_ALL_CORE_FT
Percocet 5/325 oral tablet: 1 tab(s) orally every 8 hours, As Needed  Xarelto 15 mg oral tablet: 1 tab(s) orally 2 times a day Coumadin 5 mg oral tablet: 1 tab(s) orally once a day, at night (around 9pm). Please start tonight, 12/10.

## 2019-12-08 NOTE — PROGRESS NOTE ADULT - ATTENDING COMMENTS
Pt seen and examined. On heparin gtt for DVT/PE s/p failed xarelto, being bridged to coumadin. INR still subtherapeutic.     Catrina Casillas MD  Division of Hospital Medicine  Cell: [phone]  Pager: [phone]  Office: [phone]

## 2019-12-08 NOTE — DISCHARGE NOTE PROVIDER - HOSPITAL COURSE
59M c hx obesity, provoked DVT (~4 yrs ago), recently diagnosed LLE DVT c/b b/l PE (dx'd 2 weeks ago @ [facility]) on xarelto, pw extension of LLE DVT, poss failure of xarelto. It was believed that he likely failed xarelto because of his obesity. Hematology was consulted and he was started on a heparin ggt. He was bridged to warfarin and was therapeutic on his warfarin for 48 hours before the heparin ggt was discontinued and he was discharged. While in the hospital his symptoms of increased left LE swelling decreased. A RLE doppler was negative for DVTs. 59M c hx obesity, provoked DVT (~4 yrs ago), recently diagnosed LLE DVT c/b b/l PE (dx'd 2 weeks ago @ [facility]) on xarelto, pw extension of LLE DVT, poss failure of xarelto. It was believed that he likely failed xarelto because of his obesity. Hematology was consulted and he was started on a heparin ggt. He was bridged to warfarin and was therapeutic on his warfarin before the heparin ggt was discontinued and he was discharged. While in the hospital his symptoms of increased left LE swelling decreased. A RLE doppler was negative for DVTs.        He should follow-up with his PCP, Dr. Kennedy, on Friday 12/13/19 at 10:30AM (scheduled) for an INR check. In the meantime, he should continue to take warfarin 5mg nightly. He should also follow-up with hematology clinic for further workup for possible hyper-coagulation disorder.

## 2019-12-08 NOTE — DISCHARGE NOTE PROVIDER - NSFOLLOWUPCLINICS_GEN_ALL_ED_FT
[facility]  Hematology/Oncology  [address] [id number]  Phone: [phone]  Fax:   Follow Up Time: 2 weeks

## 2019-12-09 LAB
APTT BLD: 89.9 SEC — HIGH (ref 27.5–36.3)
B2 GLYCOPROT1 AB SER QL: NEGATIVE — SIGNIFICANT CHANGE UP
HCT VFR BLD CALC: 34 % — LOW (ref 39–50)
HGB BLD-MCNC: 10.6 G/DL — LOW (ref 13–17)
INR BLD: 1.48 RATIO — HIGH (ref 0.88–1.16)
MCHC RBC-ENTMCNC: 25.7 PG — LOW (ref 27–34)
MCHC RBC-ENTMCNC: 31.2 GM/DL — LOW (ref 32–36)
MCV RBC AUTO: 82.5 FL — SIGNIFICANT CHANGE UP (ref 80–100)
NRBC # BLD: 0 /100 WBCS — SIGNIFICANT CHANGE UP (ref 0–0)
PLATELET # BLD AUTO: 315 K/UL — SIGNIFICANT CHANGE UP (ref 150–400)
PROTHROM AB SERPL-ACNC: 17.2 SEC — HIGH (ref 10–12.9)
RBC # BLD: 4.12 M/UL — LOW (ref 4.2–5.8)
RBC # FLD: 15.1 % — HIGH (ref 10.3–14.5)
WBC # BLD: 4.32 K/UL — SIGNIFICANT CHANGE UP (ref 3.8–10.5)
WBC # FLD AUTO: 4.32 K/UL — SIGNIFICANT CHANGE UP (ref 3.8–10.5)

## 2019-12-09 PROCEDURE — 99233 SBSQ HOSP IP/OBS HIGH 50: CPT | Mod: GC

## 2019-12-09 PROCEDURE — G0452: CPT | Mod: 26

## 2019-12-09 RX ORDER — WARFARIN SODIUM 2.5 MG/1
7.5 TABLET ORAL ONCE
Refills: 0 | Status: COMPLETED | OUTPATIENT
Start: 2019-12-09 | End: 2019-12-09

## 2019-12-09 RX ADMIN — SENNA PLUS 2 TABLET(S): 8.6 TABLET ORAL at 21:47

## 2019-12-09 RX ADMIN — Medication 500 MILLIGRAM(S): at 18:15

## 2019-12-09 RX ADMIN — HEPARIN SODIUM 2000 UNIT(S)/HR: 5000 INJECTION INTRAVENOUS; SUBCUTANEOUS at 08:37

## 2019-12-09 RX ADMIN — Medication 500 MILLIGRAM(S): at 06:45

## 2019-12-09 RX ADMIN — Medication 325 MILLIGRAM(S): at 06:45

## 2019-12-09 RX ADMIN — PREGABALIN 1000 MICROGRAM(S): 225 CAPSULE ORAL at 11:43

## 2019-12-09 RX ADMIN — WARFARIN SODIUM 7.5 MILLIGRAM(S): 2.5 TABLET ORAL at 21:47

## 2019-12-09 RX ADMIN — Medication 325 MILLIGRAM(S): at 18:15

## 2019-12-09 NOTE — PROGRESS NOTE ADULT - SUBJECTIVE AND OBJECTIVE BOX
Sammy Jasmine MD, PGY-1  Pager #[phone]  After 7PM on weekdays and 12 PM on weekends, page #[phone]  ----------------------------------------------------------------  Patient is a 59y old  Male who presents with a chief complaint of left leg swelling and pain (08 Dec 2019 18:16)      SUBJECTIVE / OVERNIGHT EVENTS:  ADDITIONAL REVIEW OF SYSTEMS:    MEDICATIONS  (STANDING):  ascorbic acid 500 milliGRAM(s) Oral two times a day  cyanocobalamin 1000 MICROGram(s) Oral daily  ferrous    sulfate 325 milliGRAM(s) Oral two times a day  heparin  Infusion.  Unit(s)/Hr (24 mL/Hr) IV Continuous <Continuous>  senna 2 Tablet(s) Oral at bedtime    MEDICATIONS  (PRN):  acetaminophen   Tablet .. 650 milliGRAM(s) Oral every 6 hours PRN Mild Pain (1 - 3)  heparin  Injectable 04262 Unit(s) IV Push every 6 hours PRN For aPTT less than 40  heparin  Injectable 5000 Unit(s) IV Push every 6 hours PRN For aPTT between 40 - 57  polyethylene glycol 3350 17 Gram(s) Oral daily PRN Constipation    CAPILLARY BLOOD GLUCOSE    I&O's Summary    08 Dec 2019 07:01  -  09 Dec 2019 07:00  --------------------------------------------------------  IN: 240 mL / OUT: 400 mL / NET: -160 mL    PHYSICAL EXAM:  Vital Signs Last 24 Hrs  T(C): 36.7 (08 Dec 2019 20:37), Max: 36.7 (08 Dec 2019 14:00)  T(F): 98.1 (08 Dec 2019 20:37), Max: 98.1 (08 Dec 2019 14:00)  HR: 65 (08 Dec 2019 20:37) (65 - 79)  BP: 167/89 (08 Dec 2019 20:37) (148/84 - 167/89)  RR: 18 (08 Dec 2019 20:37) (18 - 18)  SpO2: 98% (08 Dec 2019 20:37) (97% - 98%)    GENERAL: NAD, obese   EYES: EOMI, PERRLA, conjunctiva and sclera clear  NECK:  No JVD  CHEST/LUNG: CTABL ; No wheeze  HEART: NSR, no murmur   ABDOMEN: Soft, Nontender, Nondistended; Bowel sounds present  EXTREMITIES:  2+ Peripheral Pulses,  + LLE erythema and swelling. Improved from previous days.   PSYCH: AAOx 3   NEUROLOGY: no focal deficit   SKIN: No rashes or lesions. No sacral ulcer    LABS:                        10.4   4.43  )-----------( 280      ( 08 Dec 2019 07:23 )             34.3     12-08    140  |  102  |  11  ----------------------------<  101<H>  4.0   |  26  |  0.73    Ca    9.2      08 Dec 2019 07:23  Phos  3.2     12-07  Mg     2.1     12-07      PT/INR - ( 08 Dec 2019 09:01 )   PT: 16.1 sec;   INR: 1.39 ratio         PTT - ( 08 Dec 2019 09:01 )  PTT:83.7 sec    RADIOLOGY & ADDITIONAL TESTS:  Results Reviewed:   Imaging Personally Reviewed:  Electrocardiogram Personally Reviewed:    COORDINATION OF CARE:  Care Discussed with Consultants/Other Providers [Y/N]:  Prior or Outpatient Records Reviewed [Y/N]: Sammy Jasmine MD, PGY-1  Pager #[phone]  After 7PM on weekdays and 12 PM on weekends, page #[phone]  ----------------------------------------------------------------  Patient is a 59y old  Male who presents with a chief complaint of left leg swelling and pain (08 Dec 2019 18:16)      SUBJECTIVE / OVERNIGHT EVENTS:  ADDITIONAL REVIEW OF SYSTEMS:    MEDICATIONS  (STANDING):  ascorbic acid 500 milliGRAM(s) Oral two times a day  cyanocobalamin 1000 MICROGram(s) Oral daily  ferrous    sulfate 325 milliGRAM(s) Oral two times a day  heparin  Infusion.  Unit(s)/Hr (24 mL/Hr) IV Continuous <Continuous>  senna 2 Tablet(s) Oral at bedtime    MEDICATIONS  (PRN):  acetaminophen   Tablet .. 650 milliGRAM(s) Oral every 6 hours PRN Mild Pain (1 - 3)  heparin  Injectable 93344 Unit(s) IV Push every 6 hours PRN For aPTT less than 40  heparin  Injectable 5000 Unit(s) IV Push every 6 hours PRN For aPTT between 40 - 57  polyethylene glycol 3350 17 Gram(s) Oral daily PRN Constipation    CAPILLARY BLOOD GLUCOSE    I&O's Summary    08 Dec 2019 07:01  -  09 Dec 2019 07:00  --------------------------------------------------------  IN: 240 mL / OUT: 400 mL / NET: -160 mL    PHYSICAL EXAM:  Vital Signs Last 24 Hrs  T(C): 36.7 (08 Dec 2019 20:37), Max: 36.7 (08 Dec 2019 14:00)  T(F): 98.1 (08 Dec 2019 20:37), Max: 98.1 (08 Dec 2019 14:00)  HR: 65 (08 Dec 2019 20:37) (65 - 79)  BP: 167/89 (08 Dec 2019 20:37) (148/84 - 167/89)  RR: 18 (08 Dec 2019 20:37) (18 - 18)  SpO2: 98% (08 Dec 2019 20:37) (97% - 98%)    GENERAL: NAD, obese   EYES: EOMI, conjunctiva and sclera clear  NECK:  No JVD  CHEST/LUNG: CTABL ; No wheeze  HEART: NSR, no murmur   ABDOMEN: Soft, Nontender, Nondistended; Bowel sounds present  EXTREMITIES:  2+ Peripheral Pulses,  + LLE erythema and swelling. Improved from previous days.   PSYCH: AAOx 3, appropriate mood and affect   NEUROLOGY: no focal deficit   SKIN: No rashes or lesions. No sacral ulcer    LABS:                        10.4   4.43  )-----------( 280      ( 08 Dec 2019 07:23 )             34.3     12-08    140  |  102  |  11  ----------------------------<  101<H>  4.0   |  26  |  0.73    Ca    9.2      08 Dec 2019 07:23  Phos  3.2     12-07  Mg     2.1     12-07      PT/INR - ( 08 Dec 2019 09:01 )   PT: 16.1 sec;   INR: 1.39 ratio         PTT - ( 08 Dec 2019 09:01 )  PTT:83.7 sec    RADIOLOGY & ADDITIONAL TESTS:  Results Reviewed:   Imaging Personally Reviewed:  Electrocardiogram Personally Reviewed:    COORDINATION OF CARE:  Care Discussed with Consultants/Other Providers [Y]:  Prior or Outpatient Records Reviewed [Y/N]: Sammy Jasmine MD, PGY-1  Pager #[phone]  After 7PM on weekdays and 12 PM on weekends, page #[phone]  ----------------------------------------------------------------  Patient is a 59y old  Male who presents with a chief complaint of left leg swelling and pain (08 Dec 2019 18:16)      SUBJECTIVE / OVERNIGHT EVENTS: No acute events overnight. Pt seen and examined at bedside. Pt feels well overall, still with significant LLE swelling. Pt denies any acute complaints including headache, fever, chills, nausea, vomiting, diarrhea, constipation, chest pain, shortness of breath.     MEDICATIONS  (STANDING):  ascorbic acid 500 milliGRAM(s) Oral two times a day  cyanocobalamin 1000 MICROGram(s) Oral daily  ferrous    sulfate 325 milliGRAM(s) Oral two times a day  heparin  Infusion.  Unit(s)/Hr (24 mL/Hr) IV Continuous <Continuous>  senna 2 Tablet(s) Oral at bedtime    MEDICATIONS  (PRN):  acetaminophen   Tablet .. 650 milliGRAM(s) Oral every 6 hours PRN Mild Pain (1 - 3)  heparin  Injectable 22982 Unit(s) IV Push every 6 hours PRN For aPTT less than 40  heparin  Injectable 5000 Unit(s) IV Push every 6 hours PRN For aPTT between 40 - 57  polyethylene glycol 3350 17 Gram(s) Oral daily PRN Constipation    CAPILLARY BLOOD GLUCOSE    I&O's Summary    08 Dec 2019 07:01  -  09 Dec 2019 07:00  --------------------------------------------------------  IN: 240 mL / OUT: 400 mL / NET: -160 mL    PHYSICAL EXAM:  Vital Signs Last 24 Hrs  T(C): 36.7 (08 Dec 2019 20:37), Max: 36.7 (08 Dec 2019 14:00)  T(F): 98.1 (08 Dec 2019 20:37), Max: 98.1 (08 Dec 2019 14:00)  HR: 65 (08 Dec 2019 20:37) (65 - 79)  BP: 167/89 (08 Dec 2019 20:37) (148/84 - 167/89)  RR: 18 (08 Dec 2019 20:37) (18 - 18)  SpO2: 98% (08 Dec 2019 20:37) (97% - 98%)    GENERAL: NAD, obese   EYES: EOMI, conjunctiva and sclera clear  NECK:  No JVD  CHEST/LUNG: CTABL  HEART: RRR, +s1, s2, no m/r/g   ABDOMEN: Soft, Nontender, Nondistended; Bowel sounds present  EXTREMITIES:  2+ Peripheral Pulses, + LLE erythema and swelling pronounced at ankle  PSYCH: AAOx 3, appropriate mood and affect   NEUROLOGY: no focal deficit   SKIN: No rashes or lesions    LABS:             10.4   4.43  )-----------( 280      ( 08 Dec 2019 07:23 )             34.3     12-08    140  |  102  |  11  ----------------------------<  101<H>  4.0   |  26  |  0.73    Ca    9.2      08 Dec 2019 07:23  Phos  3.2     12-07  Mg     2.1     12-07      PT/INR - ( 08 Dec 2019 09:01 )   PT: 16.1 sec;   INR: 1.39 ratio         PTT - ( 08 Dec 2019 09:01 )  PTT:83.7 sec    RADIOLOGY & ADDITIONAL TESTS:  Results Reviewed:   Imaging Personally Reviewed:  Electrocardiogram Personally Reviewed:    COORDINATION OF CARE:  Care Discussed with Consultants/Other Providers [Y]:  Prior or Outpatient Records Reviewed [Y/N]:

## 2019-12-09 NOTE — PROGRESS NOTE ADULT - PROBLEM SELECTOR PLAN 2
Read from [facility]: bilateral PE, extensive clot burden within R main pulmonary artery  -Heparin gtt to coumadin bridge  -TTE from [facility] is cut off but pulm note says no evidence of RV systolic dysfunction.   -Pt's wife to bring in CTA disc from [facility]

## 2019-12-09 NOTE — PROGRESS NOTE ADULT - PROBLEM SELECTOR PLAN 5
Transitions of Care Status:  1.  Name of PCP:  2.  PCP Contacted on Admission: [ ] Y    [ ] N    3.  PCP contacted at Discharge: [ ] Y    [ ] N    [ ] N/A  4.  Post-Discharge Appointment Date and Location:  5.  Summary of Handoff given to PCP: Transitions of Care Status:  1.  Name of PCP: Marcia COTTO  2.  PCP Contacted on Admission: [ ] Y    [ ] N    3.  PCP contacted at Discharge: [ ] Y    [ ] N    [ ] N/A  4.  Post-Discharge Appointment Date and Location:  5.  Summary of Handoff given to PCP:

## 2019-12-09 NOTE — PROGRESS NOTE ADULT - ASSESSMENT
59M c hx obesity, provoked DVT (~4 yrs ago), recently diagnosed LLE DVT c/b b/l PE (dx'd 2 weeks ago @ [facility]) on xarelto, pw extension of LLE DVT, poss failure of xarelto, currently on heparin gtt bridging  to Coumadin. 59M c hx obesity, provoked DVT (~4 yrs ago), recently diagnosed LLE DVT c/b b/l PE (dx'd 2 weeks ago @ [facility]) on xarelto, pw extension of LLE DVT, poss failure of xarelto, currently on heparin gtt bridging to Coumadin.

## 2019-12-09 NOTE — PROGRESS NOTE ADULT - PROBLEM SELECTOR PLAN 1
Likely Provoked VTE  -Extension of LLE DVT (previously only L popliteal DVT at [facility])  -Concern for Xarelto failure  -Now on heparin to coumadin bridge   -f/u remaining hypercoagulable w/u   -CT A/P negative for occult malignancy (non-contrast study)

## 2019-12-09 NOTE — PROGRESS NOTE ADULT - PROBLEM SELECTOR PLAN 1
- per history, appears to be pt's 2nd lifetime provoked DVT. Reports compliance with Xarelto  - per ED notes, comparison with duplex report from [facility] shows extension of LLE DVT, concerning for failure of xarelto especially given patient's large body habitus.  - hematology consult appreciated: lupus anticoagulant panel negative, pending beta-2 glycoprotein, anti- cardiolipin, CT A/P ordered for malignancy screen   - c/w heparin gtt, daily Coumadin dosing - per history, appears to be pt's 2nd lifetime provoked DVT. Reports compliance with Xarelto  - per ED notes, comparison with duplex report from [facility] shows extension of LLE DVT, concerning for failure of xarelto especially given patient's large body habitus.  - hematology consult appreciated: lupus anticoagulant panel negative, pending beta-2 glycoprotein, anti-cardiolipin, CT A/P ordered for malignancy screen   - c/w heparin gtt, daily Coumadin dosing. received 3 days of 5 mg, 1 day of 7.5. will continue with 7.5 for another 2 doses before redosing if necessary.

## 2019-12-09 NOTE — PROGRESS NOTE ADULT - ASSESSMENT
60 y/o M with PMH of obesity, provoked DVT (~4 yrs ago), recently diagnosed LLE DVT and extensive bilateral PE (11/24 at [facility]) on Xarelto now presents with worsened LLE edema, new since discharge. Found to have extension of LLE DVT concerning for Xarelto failure. Patient endorses taking all doses of Xarelto as prescribed. Patient reports being up to date on malignancy screening, colonoscopy 1 year ago was normal per patient.     Hypercoagulable w/u at [facility]: negative factor V Leiden, low AT III, normal protein C, homocysteine.   Risk factors: recent prolonged stasis (frequent 13h drives to [place]), +family history

## 2019-12-09 NOTE — PROGRESS NOTE ADULT - ATTENDING COMMENTS
Patient seen and examined. 59 year old male presenting after second provoked DVT, with failure of DOAC. We are bridging coumadin. Improving clinically.   Continue to titrate coumadin, heparin bridge

## 2019-12-09 NOTE — PROGRESS NOTE ADULT - PROBLEM SELECTOR PLAN 3
- anemia labs c/w some component of DONNELL  - c/w Fe and B12 supplement; will start vit C and bowel regimen per heme recs  - pt denies bleeding anywhere  - f/u with pmd for further anemia workup

## 2019-12-09 NOTE — PROGRESS NOTE ADULT - SUBJECTIVE AND OBJECTIVE BOX
Follow-up Pulm Progress Note    No new respiratory events overnight.  Denies SOB/CP.   98% on RA    Medications:  MEDICATIONS  (STANDING):  ascorbic acid 500 milliGRAM(s) Oral two times a day  cyanocobalamin 1000 MICROGram(s) Oral daily  ferrous    sulfate 325 milliGRAM(s) Oral two times a day  heparin  Infusion.  Unit(s)/Hr (24 mL/Hr) IV Continuous <Continuous>  senna 2 Tablet(s) Oral at bedtime    MEDICATIONS  (PRN):  acetaminophen   Tablet .. 650 milliGRAM(s) Oral every 6 hours PRN Mild Pain (1 - 3)  heparin  Injectable 50443 Unit(s) IV Push every 6 hours PRN For aPTT less than 40  heparin  Injectable 5000 Unit(s) IV Push every 6 hours PRN For aPTT between 40 - 57  polyethylene glycol 3350 17 Gram(s) Oral daily PRN Constipation          Vital Signs Last 24 Hrs  T(C): 36.6 (09 Dec 2019 06:50), Max: 36.7 (08 Dec 2019 14:00)  T(F): 97.9 (09 Dec 2019 06:50), Max: 98.1 (08 Dec 2019 14:00)  HR: 62 (09 Dec 2019 06:50) (62 - 79)  BP: 130/75 (09 Dec 2019 06:50) (130/75 - 167/89)  BP(mean): --  RR: 18 (09 Dec 2019 06:50) (18 - 18)  SpO2: 98% (09 Dec 2019 06:50) (97% - 98%) on RA          12-08 @ 07:01  -  12-09 @ 07:00  --------------------------------------------------------  IN: 480 mL / OUT: 700 mL / NET: -220 mL          LABS:                        10.6   4.32  )-----------( 315      ( 09 Dec 2019 07:18 )             34.0     12-08    140  |  102  |  11  ----------------------------<  101<H>  4.0   |  26  |  0.73    Ca    9.2      08 Dec 2019 07:23            CAPILLARY BLOOD GLUCOSE        PT/INR - ( 09 Dec 2019 07:21 )   PT: 17.2 sec;   INR: 1.48 ratio         PTT - ( 09 Dec 2019 07:21 )  PTT:89.9 sec            Physical Examination:  PULM: Clear to auscultation bilaterally, no significant sputum production  CVS: S1, S2 heard    RADIOLOGY REVIEWED  CXR: 12/5: Clear lungs

## 2019-12-10 ENCOUNTER — TRANSCRIPTION ENCOUNTER (OUTPATIENT)
Age: 59
End: 2019-12-10

## 2019-12-10 VITALS
DIASTOLIC BLOOD PRESSURE: 99 MMHG | RESPIRATION RATE: 18 BRPM | TEMPERATURE: 99 F | HEART RATE: 73 BPM | OXYGEN SATURATION: 96 % | SYSTOLIC BLOOD PRESSURE: 146 MMHG

## 2019-12-10 DIAGNOSIS — E66.01 MORBID (SEVERE) OBESITY DUE TO EXCESS CALORIES: ICD-10-CM

## 2019-12-10 DIAGNOSIS — Z29.9 ENCOUNTER FOR PROPHYLACTIC MEASURES, UNSPECIFIED: ICD-10-CM

## 2019-12-10 LAB
ANION GAP SERPL CALC-SCNC: 9 MMOL/L — SIGNIFICANT CHANGE UP (ref 5–17)
APTT BLD: 73.4 SEC — HIGH (ref 27.5–36.3)
BUN SERPL-MCNC: 13 MG/DL — SIGNIFICANT CHANGE UP (ref 7–23)
CALCIUM SERPL-MCNC: 8.2 MG/DL — LOW (ref 8.4–10.5)
CHLORIDE SERPL-SCNC: 105 MMOL/L — SIGNIFICANT CHANGE UP (ref 96–108)
CO2 SERPL-SCNC: 29 MMOL/L — SIGNIFICANT CHANGE UP (ref 22–31)
CREAT SERPL-MCNC: 0.82 MG/DL — SIGNIFICANT CHANGE UP (ref 0.5–1.3)
GLUCOSE SERPL-MCNC: 95 MG/DL — SIGNIFICANT CHANGE UP (ref 70–99)
HCT VFR BLD CALC: 34.1 % — LOW (ref 39–50)
HGB BLD-MCNC: 10.8 G/DL — LOW (ref 13–17)
INR BLD: 1.92 RATIO — HIGH (ref 0.88–1.16)
INR BLD: 2.25 RATIO — HIGH (ref 0.88–1.16)
MAGNESIUM SERPL-MCNC: 2.1 MG/DL — SIGNIFICANT CHANGE UP (ref 1.6–2.6)
MCHC RBC-ENTMCNC: 26 PG — LOW (ref 27–34)
MCHC RBC-ENTMCNC: 31.7 GM/DL — LOW (ref 32–36)
MCV RBC AUTO: 82.2 FL — SIGNIFICANT CHANGE UP (ref 80–100)
NRBC # BLD: 0 /100 WBCS — SIGNIFICANT CHANGE UP (ref 0–0)
PHOSPHATE SERPL-MCNC: 3.5 MG/DL — SIGNIFICANT CHANGE UP (ref 2.5–4.5)
PLATELET # BLD AUTO: 292 K/UL — SIGNIFICANT CHANGE UP (ref 150–400)
POTASSIUM SERPL-MCNC: 4.5 MMOL/L — SIGNIFICANT CHANGE UP (ref 3.5–5.3)
POTASSIUM SERPL-SCNC: 4.5 MMOL/L — SIGNIFICANT CHANGE UP (ref 3.5–5.3)
PROTHROM AB SERPL-ACNC: 22.3 SEC — HIGH (ref 10–12.9)
PROTHROM AB SERPL-ACNC: 26.5 SEC — HIGH (ref 10–12.9)
RBC # BLD: 4.15 M/UL — LOW (ref 4.2–5.8)
RBC # FLD: 14.9 % — HIGH (ref 10.3–14.5)
SODIUM SERPL-SCNC: 143 MMOL/L — SIGNIFICANT CHANGE UP (ref 135–145)
WBC # BLD: 4.56 K/UL — SIGNIFICANT CHANGE UP (ref 3.8–10.5)
WBC # FLD AUTO: 4.56 K/UL — SIGNIFICANT CHANGE UP (ref 3.8–10.5)

## 2019-12-10 PROCEDURE — 84484 ASSAY OF TROPONIN QUANT: CPT

## 2019-12-10 PROCEDURE — 84100 ASSAY OF PHOSPHORUS: CPT

## 2019-12-10 PROCEDURE — 83550 IRON BINDING TEST: CPT

## 2019-12-10 PROCEDURE — 85027 COMPLETE CBC AUTOMATED: CPT

## 2019-12-10 PROCEDURE — 86146 BETA-2 GLYCOPROTEIN ANTIBODY: CPT

## 2019-12-10 PROCEDURE — 81240 F2 GENE: CPT

## 2019-12-10 PROCEDURE — 83540 ASSAY OF IRON: CPT

## 2019-12-10 PROCEDURE — 74176 CT ABD & PELVIS W/O CONTRAST: CPT

## 2019-12-10 PROCEDURE — 83880 ASSAY OF NATRIURETIC PEPTIDE: CPT

## 2019-12-10 PROCEDURE — 93971 EXTREMITY STUDY: CPT

## 2019-12-10 PROCEDURE — 85730 THROMBOPLASTIN TIME PARTIAL: CPT

## 2019-12-10 PROCEDURE — 93005 ELECTROCARDIOGRAM TRACING: CPT | Mod: 76

## 2019-12-10 PROCEDURE — 85610 PROTHROMBIN TIME: CPT

## 2019-12-10 PROCEDURE — 80053 COMPREHEN METABOLIC PANEL: CPT

## 2019-12-10 PROCEDURE — 71046 X-RAY EXAM CHEST 2 VIEWS: CPT

## 2019-12-10 PROCEDURE — 86803 HEPATITIS C AB TEST: CPT

## 2019-12-10 PROCEDURE — 83735 ASSAY OF MAGNESIUM: CPT

## 2019-12-10 PROCEDURE — 82728 ASSAY OF FERRITIN: CPT

## 2019-12-10 PROCEDURE — 80048 BASIC METABOLIC PNL TOTAL CA: CPT

## 2019-12-10 PROCEDURE — 84443 ASSAY THYROID STIM HORMONE: CPT

## 2019-12-10 PROCEDURE — 85045 AUTOMATED RETICULOCYTE COUNT: CPT

## 2019-12-10 PROCEDURE — 99239 HOSP IP/OBS DSCHRG MGMT >30: CPT | Mod: GC

## 2019-12-10 PROCEDURE — 99285 EMERGENCY DEPT VISIT HI MDM: CPT | Mod: 25

## 2019-12-10 RX ORDER — FONDAPARINUX SODIUM 2.5 MG/.5ML
1 INJECTION, SOLUTION SUBCUTANEOUS
Qty: 0 | Refills: 0 | DISCHARGE

## 2019-12-10 RX ORDER — FERROUS SULFATE 325(65) MG
325 TABLET ORAL DAILY
Refills: 0 | Status: DISCONTINUED | OUTPATIENT
Start: 2019-12-10 | End: 2019-12-10

## 2019-12-10 RX ORDER — WARFARIN SODIUM 2.5 MG/1
1 TABLET ORAL
Qty: 30 | Refills: 0
Start: 2019-12-10 | End: 2020-01-08

## 2019-12-10 RX ADMIN — HEPARIN SODIUM 2000 UNIT(S)/HR: 5000 INJECTION INTRAVENOUS; SUBCUTANEOUS at 07:26

## 2019-12-10 RX ADMIN — Medication 500 MILLIGRAM(S): at 05:49

## 2019-12-10 RX ADMIN — Medication 500 MILLIGRAM(S): at 17:03

## 2019-12-10 RX ADMIN — Medication 325 MILLIGRAM(S): at 05:49

## 2019-12-10 RX ADMIN — PREGABALIN 1000 MICROGRAM(S): 225 CAPSULE ORAL at 12:13

## 2019-12-10 NOTE — DIETITIAN INITIAL EVALUATION ADULT. - OTHER INFO
Pt reports good appetite and PO intake PTA. Pt denies any diet restrictions at home. Reports eating 3 meals/day and eating mostly fish as his protein at home.  Pt reports taking vitamin B12 and folic acid PTA- admits he was not consistently taking them but "will be better about it now". Pt states NKFA.   Pt reports continued good appetite and PO intake. Observed >75% breakfast consumed today (12/10). Pt denies difficulty chewing or swallowing. Reports no N+V. States last BM this morning (12/10) with no GI distress. Pt has hx of bariatric surgery- gastric bypass ~13 years ago and lap band ~3 years ago with total weight loss of ~200 pounds. Reports weight before bariatric surgeries as 500 pounds. States weight now 325-335 pounds; consistent with current charted dosing weight 335.1 pounds.- Will continue to monitor.  Pt amenable to warfarin education and weight loss tips using handout and My Plate Planner. Discussed importance of consistent vitamin K intake with examples of foods, and the benefits of weight loss with some tips for healthful eating using the My Plate Planner. Spoke about balanced meals and food groups. Pt reports good appetite and PO intake PTA. Pt denies any diet restrictions at home. Reports eating 3 meals/day and eating mostly fish as his protein at home.  Pt reports taking vitamin B12 and folic acid PTA- admits he was not consistently taking them but "will be better about it now". Pt states NKFA.   Pt reports continued good appetite and PO intake. Observed >75% breakfast consumed today (12/10). Pt denies difficulty chewing or swallowing. Reports no N+V. States last BM this morning (12/10) with no GI distress. Pt has hx of bariatric surgery- gastric bypass ~13 years ago and lap band ~3 years ago with total weight loss of ~200 pounds. Reports weight before bariatric surgeries as 500 pounds. States weight now 325-335 pounds; consistent with current charted dosing weight 335.1 pounds (12/05).- Will continue to monitor.  Pt amenable to warfarin education and weight loss tips using handout and My Plate Planner. Discussed importance of consistent vitamin K intake with examples of foods, and the benefits of weight loss with some tips for healthful eating using the My Plate Planner. Spoke about balanced meals and food groups.

## 2019-12-10 NOTE — PROGRESS NOTE ADULT - PROBLEM SELECTOR PROBLEM 1
Acute deep vein thrombosis (DVT) of femoral vein of left lower extremity
DVT (deep venous thrombosis)
Acute deep vein thrombosis (DVT) of femoral vein of left lower extremity

## 2019-12-10 NOTE — CHART NOTE - NSCHARTNOTEFT_GEN_A_CORE
Upon Nutritional Assessment by the Registered Dietitian your patient was determined to meet criteria / has evidence of the following diagnosis/diagnoses:          [ ]  Mild Protein Calorie Malnutrition        [ ]  Moderate Protein Calorie Malnutrition        [ ] Severe Protein Calorie Malnutrition        [ ] Unspecified Protein Calorie Malnutrition        [ ] Underweight / BMI <19        [ x] Morbid Obesity / BMI > 40      Findings as based on:  [ ] Comprehensive nutrition assessment         *INCOMPLETE*  [ ] Nutrition Focused Physical Exam  [ x] Other: BMI 44.2 kg/m2      Nutrition Plan/Recommendations:    1) Continue current diet order  2) Educated pt on warfarin and vitamin K, and weight loss tips using handout and My Plate Planner; reinforce education PRN  3) Continue to monitor PO intake, diet tolerance, weight, labs, skin integrity, food preferences, and further educational needs.       PROVIDER Section:     By signing this assessment you are acknowledging and agree with the diagnosis/diagnoses assigned by the Registered Dietitian    Comments: Upon Nutritional Assessment by the Registered Dietitian your patient was determined to meet criteria / has evidence of the following diagnosis/diagnoses:          [ ]  Mild Protein Calorie Malnutrition        [ ]  Moderate Protein Calorie Malnutrition        [ ] Severe Protein Calorie Malnutrition        [ ] Unspecified Protein Calorie Malnutrition        [ ] Underweight / BMI <19        [ x] Morbid Obesity / BMI > 40      Findings as based on:  [ ] Comprehensive nutrition assessment           [ ] Nutrition Focused Physical Exam  [ x] Other: BMI 44.2 kg/m2      Nutrition Plan/Recommendations:    1) Continue current diet order  2) Educated pt on warfarin and vitamin K, and weight loss tips using handout and My Plate Planner; reinforce education PRN  3) Continue to monitor PO intake, diet tolerance, weight, labs, skin integrity, food preferences, and further educational needs.       PROVIDER Section:     By signing this assessment you are acknowledging and agree with the diagnosis/diagnoses assigned by the Registered Dietitian    Comments:

## 2019-12-10 NOTE — PROGRESS NOTE ADULT - SUBJECTIVE AND OBJECTIVE BOX
Lincoln Bright MD  Internal Medicine, PGY1  [phone]    Progress Note    Interval events: No acute events.  ROS positive for:     PRNs:  ferrous    sulfate: 325 milliGRAM(s) Oral (12-10 @ 05:49)  ascorbic acid: 500 milliGRAM(s) Oral (12-10 @ 05:49)  warfarin: 7.5 milliGRAM(s) Oral (12-09 @ 21:47)  senna: 2 Tablet(s) Oral (12-09 @ 21:47)        OBJECTIVE:    12-09 @ 07:01  -  12-10 @ 07:00  --------------------------------------------------------  IN: 530 mL / OUT: 2000 mL / NET: -1470 mL      CAPILLARY BLOOD GLUCOSE            VITALS:  T(F): 97.6 (12-10-19 @ 05:40), Max: 98.1 (12-09-19 @ 13:42)  HR: 58 (12-10-19 @ 05:40) (58 - 71)  BP: 128/80 (12-10-19 @ 05:40) (128/80 - 149/83)  RR: 18 (12-10-19 @ 05:40) (18 - 18)  SpO2: 96% (12-10-19 @ 05:40) (96% - 99%)    PHYSICAL EXAM:  GENERAL: NAD, lying in bed comfortably  HEAD:  Atraumatic, Normocephalic  EYES: EOMI, PERRLA, conjunctiva and sclera clear  ENT: Moist mucous membranes  NECK: Supple, No JVD  CHEST/LUNG: Clear to auscultation bilaterally; No rales, rhonchi, wheezing, or rubs. Unlabored respirations  HEART: Regular rate and rhythm; No murmurs, rubs, or gallops  ABDOMEN: Bowel sounds present; Soft, Nontender, Nondistended. No hepatomegaly  EXTREMITIES:  2+ Peripheral Pulses, brisk capillary refill. No clubbing, cyanosis, or edema  NERVOUS SYSTEM:  Alert & Oriented X3, speech clear. No deficits   MSK: FROM all 4 extremities, full and equal strength  SKIN: No rashes or lesions    HOSPITAL MEDICATIONS:  Standing Meds:  ascorbic acid 500 milliGRAM(s) Oral two times a day  cyanocobalamin 1000 MICROGram(s) Oral daily  ferrous    sulfate 325 milliGRAM(s) Oral two times a day  heparin  Infusion.  Unit(s)/Hr IV Continuous <Continuous>  senna 2 Tablet(s) Oral at bedtime      PRN Meds:  acetaminophen   Tablet .. 650 milliGRAM(s) Oral every 6 hours PRN  heparin  Injectable 34248 Unit(s) IV Push every 6 hours PRN  heparin  Injectable 5000 Unit(s) IV Push every 6 hours PRN  polyethylene glycol 3350 17 Gram(s) Oral daily PRN      LABS:  CBC 12-10-19 @ 06:45                        10.8   4.56  )-----------( 292                   34.1     Hgb trend: 10.8 <-- , 10.6 <-- , 10.4 <--   WBC trend: 4.56 <-- , 4.32 <-- , 4.43 <--     CMP 12-10-19 @ 06:37    143  |  105  |  13  ----------------------------<  95  4.5   |  29  |  0.82    Ca    8.2<L>      12-10-19 @ 06:37  Phos  3.5     12-10  Mg     2.1     12-10      Serum Cr trend: 0.82 <-- , 0.73 <--   PT/INR - ( 10 Dec 2019 06:45 )   PT: 22.3 sec;   INR: 1.92 ratio         PTT - ( 10 Dec 2019 06:45 )  PTT:73.4 sec          MICROBIOLOGY:       RADIOLOGY:  [ ] Reviewed and interpreted by me    EKG: Lincoln Bright MD  Internal Medicine, PGY1  [phone]    Progress Note    Interval events: No acute events. Patient feels well overall. Ambulating without issue. INR 1.9 this morning  No fevers, chills, dyspnea. LE edema improved. No cp, cough, palpitations. No N/V/D.    PRNs:  ferrous    sulfate: 325 milliGRAM(s) Oral (12-10 @ 05:49)  ascorbic acid: 500 milliGRAM(s) Oral (12-10 @ 05:49)  warfarin: 7.5 milliGRAM(s) Oral (12-09 @ 21:47)  senna: 2 Tablet(s) Oral (12-09 @ 21:47)        OBJECTIVE:    12-09 @ 07:01  -  12-10 @ 07:00  --------------------------------------------------------  IN: 530 mL / OUT: 2000 mL / NET: -1470 mL      CAPILLARY BLOOD GLUCOSE            VITALS:  T(F): 97.6 (12-10-19 @ 05:40), Max: 98.1 (12-09-19 @ 13:42)  HR: 58 (12-10-19 @ 05:40) (58 - 71)  BP: 128/80 (12-10-19 @ 05:40) (128/80 - 149/83)  RR: 18 (12-10-19 @ 05:40) (18 - 18)  SpO2: 96% (12-10-19 @ 05:40) (96% - 99%)    PHYSICAL EXAM:  GENERAL: Morbidly obese male, appears comfortable  EYES: EOMI,  conjunctiva and sclera clear  ENT: Moist mucous membranes, normal dentition  CHEST/LUNG: Clear to auscultation bilaterally; No rales, rhonchi, wheezing, or rubs. Unlabored respirations  HEART: Regular rate and rhythm; No murmurs, rubs, or gallops  ABDOMEN: Bowel sounds present; Soft, Nontender, Nondistended. No hepatomegaly  EXTREMITIES:  2+ Peripheral Pulses, brisk capillary refill. No clubbing, cyanosis, or edema  NERVOUS SYSTEM:  Alert & Oriented X3, speech clear. No deficits   MSK: FROM all 4 extremities, full and equal strength  SKIN: No rashes or lesions    HOSPITAL MEDICATIONS:  Standing Meds:  ascorbic acid 500 milliGRAM(s) Oral two times a day  cyanocobalamin 1000 MICROGram(s) Oral daily  ferrous    sulfate 325 milliGRAM(s) Oral two times a day  heparin  Infusion.  Unit(s)/Hr IV Continuous <Continuous>  senna 2 Tablet(s) Oral at bedtime      PRN Meds:  acetaminophen   Tablet .. 650 milliGRAM(s) Oral every 6 hours PRN  heparin  Injectable 98929 Unit(s) IV Push every 6 hours PRN  heparin  Injectable 5000 Unit(s) IV Push every 6 hours PRN  polyethylene glycol 3350 17 Gram(s) Oral daily PRN      LABS:  CBC 12-10-19 @ 06:45                        10.8   4.56  )-----------( 292                   34.1     Hgb trend: 10.8 <-- , 10.6 <-- , 10.4 <--   WBC trend: 4.56 <-- , 4.32 <-- , 4.43 <--     CMP 12-10-19 @ 06:37    143  |  105  |  13  ----------------------------<  95  4.5   |  29  |  0.82    Ca    8.2<L>      12-10-19 @ 06:37  Phos  3.5     12-10  Mg     2.1     12-10      Serum Cr trend: 0.82 <-- , 0.73 <--   PT/INR - ( 10 Dec 2019 06:45 )   PT: 22.3 sec;   INR: 1.92 ratio         PTT - ( 10 Dec 2019 06:45 )  PTT:73.4 sec          MICROBIOLOGY:       RADIOLOGY:  [ ] Reviewed and interpreted by me    EKG:    Plan of care discussed with providers: Nursing management, pulmonary  Consult notes reviewed Lincoln Bright MD  Internal Medicine, PGY1  [phone]    Progress Note    Interval events: No acute events. Patient feels well overall. Ambulating without issue. INR 1.9 this morning  No fevers, chills, dyspnea. LE edema improved. No cp, cough, palpitations. No N/V/D.    PRNs:  ferrous    sulfate: 325 milliGRAM(s) Oral (12-10 @ 05:49)  ascorbic acid: 500 milliGRAM(s) Oral (12-10 @ 05:49)  warfarin: 7.5 milliGRAM(s) Oral (12-09 @ 21:47)  senna: 2 Tablet(s) Oral (12-09 @ 21:47)        OBJECTIVE:    12-09 @ 07:01  -  12-10 @ 07:00  --------------------------------------------------------  IN: 530 mL / OUT: 2000 mL / NET: -1470 mL      CAPILLARY BLOOD GLUCOSE            VITALS:  T(F): 97.6 (12-10-19 @ 05:40), Max: 98.1 (12-09-19 @ 13:42)  HR: 58 (12-10-19 @ 05:40) (58 - 71)  BP: 128/80 (12-10-19 @ 05:40) (128/80 - 149/83)  RR: 18 (12-10-19 @ 05:40) (18 - 18)  SpO2: 96% (12-10-19 @ 05:40) (96% - 99%)    PHYSICAL EXAM:  GENERAL: NAD, obese   EYES: EOMI, conjunctiva and sclera clear  NECK:  No JVD  CHEST/LUNG: CTABL  HEART: RRR, +s1, s2, no m/r/g   ABDOMEN: Soft, Nontender, Nondistended; Bowel sounds present  EXTREMITIES:  2+ Peripheral Pulses, + LLE edema w/o erythema, TTP, or warmth  PSYCH: AAOx 3, appropriate mood and affect   NEUROLOGY: no focal deficit   SKIN: No rashes or lesions    HOSPITAL MEDICATIONS:  Standing Meds:  ascorbic acid 500 milliGRAM(s) Oral two times a day  cyanocobalamin 1000 MICROGram(s) Oral daily  ferrous    sulfate 325 milliGRAM(s) Oral two times a day  heparin  Infusion.  Unit(s)/Hr IV Continuous <Continuous>  senna 2 Tablet(s) Oral at bedtime      PRN Meds:  acetaminophen   Tablet .. 650 milliGRAM(s) Oral every 6 hours PRN  heparin  Injectable 70068 Unit(s) IV Push every 6 hours PRN  heparin  Injectable 5000 Unit(s) IV Push every 6 hours PRN  polyethylene glycol 3350 17 Gram(s) Oral daily PRN      LABS:  CBC 12-10-19 @ 06:45                        10.8   4.56  )-----------( 292                   34.1     Hgb trend: 10.8 <-- , 10.6 <-- , 10.4 <--   WBC trend: 4.56 <-- , 4.32 <-- , 4.43 <--     CMP 12-10-19 @ 06:37    143  |  105  |  13  ----------------------------<  95  4.5   |  29  |  0.82    Ca    8.2<L>      12-10-19 @ 06:37  Phos  3.5     12-10  Mg     2.1     12-10      Serum Cr trend: 0.82 <-- , 0.73 <--   PT/INR - ( 10 Dec 2019 14:39 )   PT: 26.5 sec;   INR: 2.25 ratio           PTT - ( 10 Dec 2019 06:45 ):73.4 sec    MICROBIOLOGY:       RADIOLOGY:  [ ] Reviewed and interpreted by me    EKG:    Plan of care discussed with providers: Nursing management, pulmonary  Consult notes reviewed

## 2019-12-10 NOTE — DIETITIAN INITIAL EVALUATION ADULT. - ENERGY NEEDS
Per medical record pt is a 60 yo male with PMH: obesity, provoked DVT, LLE DVT, bilateral PE on Xarelto presented with worsening left leg pain, found with acute DVT of femoral vein in left leg.

## 2019-12-10 NOTE — DIETITIAN INITIAL EVALUATION ADULT. - PROBLEM SELECTOR PLAN 1
- per history, appears to be pt's 2nd lifetime provoked DVT  - per ED notes, comparison with duplex report from [facility] shows extension of LLE DVT, concerning for failure of xarelto  - f/u Heme recs  - per heme, pt should be on hep gtt bridged to coumadin  - will give one dose coumadin tonight  - of note, pt has prolonged PT/INR before starting coumadin, presumed 2/2 xarelto, which pt reports good compliance with  - pt requests placement on a different novel oral anticoagulant rather than lovenox or coumadin. will need discussion with heme  - will check lupus anticoagulant in AM  - other hypercoag workup deferred to heme  - hold off IVC filter for now

## 2019-12-10 NOTE — PROGRESS NOTE ADULT - PROBLEM SELECTOR PLAN 5
Transitions of Care Status:  1.  Name of PCP: Marcia COTTO  2.  PCP Contacted on Admission: [ ] Y    [ ] N    3.  PCP contacted at Discharge: [ ] Y    [ ] N    [ ] N/A  4.  Post-Discharge Appointment Date and Location:  5.  Summary of Handoff given to PCP: Nutrition consult appreciated

## 2019-12-10 NOTE — DIETITIAN INITIAL EVALUATION ADULT. - PHYSICAL APPEARANCE
obese/other (specify) Ht: 73 inches Wt: 335.1 pounds BMI: 44.2kg/m2 IBW: 184 pounds (+/-10%) %IBW: 182%  Edema: 2+ left leg, ankle, foot per flow sheets Skin: intact per documentation

## 2019-12-10 NOTE — PROGRESS NOTE ADULT - PROBLEM SELECTOR PLAN 1
Likely Provoked VTE  -Extension of LLE DVT (previously only L popliteal DVT at [facility])  -Concern for Xarelto failure  -Now on heparin to coumadin bridge   -f/u Prothrombin gene mutation  -CT A/P negative for occult malignancy (non-contrast study)

## 2019-12-10 NOTE — PROGRESS NOTE ADULT - PROBLEM SELECTOR PLAN 2
- pt reports improvement in SOB since PE was diagnosed 2 weeks ago.   - cont a/c as above pt reports improvement in SOB since PE was diagnosed 2 weeks ago.   - cont a/c as above  - Pulmonary is following, will appreciate their recommendations

## 2019-12-10 NOTE — PROGRESS NOTE ADULT - ATTENDING COMMENTS
Patient seen and examined.   59 year old male presenting after second provoked DVT, with failure of DOAC. We are bridging coumadin. Will re-assess INR this afternoon and plan for discharge once INR 2-3 (whether today or tomorrow). Will update PCP, Dr. Kennedy and have him follow INR on outpatient setting. Will also need follow up with heme to complete hypercoagulability work up.   Case discussed with patient and wife at bedside, both are nurses. All questions were answered. Patient seen and examined.   59 year old male presenting after second provoked DVT, with failure of DOAC. We are bridging coumadin. Will re-assess INR this afternoon and plan for discharge once INR 2-3 (whether today or tomorrow). Will update PCP, Dr. Kennedy and have him follow INR on outpatient setting. Will also need follow up with heme to complete hypercoagulability work up.   Case discussed with patient and wife at bedside, both are nurses. All questions were answered. 38 minutes spent discharge planning Patient seen and examined.   59 year old male presenting after second provoked DVT, with failure of DOAC. We are bridging coumadin. Will re-assess INR this afternoon and plan for discharge once INR 2-3 (whether today or tomorrow). Will update PCP, Dr. Kennedy and have him follow INR on outpatient setting. Appt scheduled for 12/13. Will also need follow up with heme to complete hypercoagulability work up.   Case discussed with patient and wife at bedside, both are nurses. All questions were answered. 38 minutes spent discharge planning

## 2019-12-10 NOTE — PROGRESS NOTE ADULT - REASON FOR ADMISSION
left leg swelling and pain

## 2019-12-10 NOTE — PROGRESS NOTE ADULT - PROBLEM SELECTOR PLAN 2
Read from [facility]: bilateral PE, extensive clot burden within R main pulmonary artery  -Heparin gtt to coumadin bridge  -TTE from [facility] is cut off but pulm note says no evidence of RV systolic dysfunction.   -Pt's wife to bring in CTA disc from [facility]

## 2019-12-10 NOTE — PROGRESS NOTE ADULT - SUBJECTIVE AND OBJECTIVE BOX
Follow-up Pulm Progress Note    No new respiratory events overnight.  Denies SOB/CP.   98% on RA    Medications:  MEDICATIONS  (STANDING):  ascorbic acid 500 milliGRAM(s) Oral two times a day  cyanocobalamin 1000 MICROGram(s) Oral daily  ferrous    sulfate 325 milliGRAM(s) Oral two times a day  heparin  Infusion.  Unit(s)/Hr (24 mL/Hr) IV Continuous <Continuous>  senna 2 Tablet(s) Oral at bedtime    MEDICATIONS  (PRN):  acetaminophen   Tablet .. 650 milliGRAM(s) Oral every 6 hours PRN Mild Pain (1 - 3)  heparin  Injectable 47817 Unit(s) IV Push every 6 hours PRN For aPTT less than 40  heparin  Injectable 5000 Unit(s) IV Push every 6 hours PRN For aPTT between 40 - 57  polyethylene glycol 3350 17 Gram(s) Oral daily PRN Constipation          Vital Signs Last 24 Hrs  T(C): 36.7 (10 Dec 2019 11:00), Max: 36.7 (09 Dec 2019 13:42)  T(F): 98 (10 Dec 2019 11:00), Max: 98.1 (09 Dec 2019 13:42)  HR: 54 (10 Dec 2019 11:00) (54 - 71)  BP: 112/68 (10 Dec 2019 11:00) (112/68 - 149/83)  BP(mean): --  RR: 18 (10 Dec 2019 11:00) (18 - 18)  SpO2: 96% (10 Dec 2019 11:00) (96% - 99%) on RA          12-09 @ 07:01  -  12-10 @ 07:00  --------------------------------------------------------  IN: 530 mL / OUT: 2000 mL / NET: -1470 mL          LABS:                        10.8   4.56  )-----------( 292      ( 10 Dec 2019 06:45 )             34.1     12-10    143  |  105  |  13  ----------------------------<  95  4.5   |  29  |  0.82    Ca    8.2<L>      10 Dec 2019 06:37  Phos  3.5     12-10  Mg     2.1     12-10            CAPILLARY BLOOD GLUCOSE        PT/INR - ( 10 Dec 2019 06:45 )   PT: 22.3 sec;   INR: 1.92 ratio         PTT - ( 10 Dec 2019 06:45 )  PTT:73.4 sec          Physical Examination:  PULM: Clear to auscultation bilaterally, no significant sputum production  CVS: S1, S2 heard    RADIOLOGY REVIEWED  CXR: 12/5: Clear lungs

## 2019-12-10 NOTE — PROGRESS NOTE ADULT - ASSESSMENT
58 y/o M with PMH of obesity, provoked DVT (~4 yrs ago), recently diagnosed LLE DVT and extensive bilateral PE (11/24 at [facility]) on Xarelto now presents with worsened LLE edema, new since discharge. Found to have extension of LLE DVT concerning for Xarelto failure. Patient endorses taking all doses of Xarelto as prescribed. Patient reports being up to date on malignancy screening, colonoscopy 1 year ago was normal per patient.     Hypercoagulable w/u at [facility]: negative factor V Leiden, low AT III, normal protein C, homocysteine.   Risk factors: recent prolonged stasis (frequent 13h drives to [place]), +family history

## 2019-12-10 NOTE — DIETITIAN INITIAL EVALUATION ADULT. - ADD RECOMMEND
1. Continue to monitor PO intake, diet tolerance, weight, labs, skin integrity, food preferences, and further educational needs. 2. Educated pt on warfarin and vitamin K, and weight loss tips using handout and My Plate Planner 1. Continue to monitor PO intake, diet tolerance, weight, labs, skin integrity, food preferences, and further educational needs. 2. Educated pt on warfarin and vitamin K, and weight loss tips using handout and My Plate Planner 3. BMI >40 alert placed in chart- Spoke to provider

## 2019-12-10 NOTE — PROGRESS NOTE ADULT - PROBLEM SELECTOR PROBLEM 2
Pulmonary embolism
Pulmonary embolism, unspecified chronicity, unspecified pulmonary embolism type, unspecified whether acute cor pulmonale present

## 2019-12-10 NOTE — PROGRESS NOTE ADULT - PROBLEM SELECTOR PLAN 7
Transitions of Care Status:  1.  Name of PCP: Marcia COTTO  2.  PCP Contacted on Admission: [ ] Y    [ ] N    3.  PCP contacted at Discharge: [ ] Y    [ ] N    [ ] N/A  4.  Post-Discharge Appointment Date and Location:  5.  Summary of Handoff given to PCP: Transitions of Care Status:  1.  Name of PCP: Marcia COTTO  2.  PCP Contacted on Admission: [ ] Y    [ ] N    3.  PCP contacted at Discharge: [x] Y    [ ] N    [ ] N/A  4.  Post-Discharge Appointment Date and Location: 12/13 at 10:30  5.  Summary of Handoff given to PCP:

## 2019-12-10 NOTE — PROGRESS NOTE ADULT - PROBLEM SELECTOR PLAN 4
- ekg nonischemic, also first degree heart block  - pt asymptomatic  - TSH mildly elevated to 4.72 ekg nonischemic, also first degree heart block  pt asymptomatic  - TSH mildly elevated to 4.72, will make PCP aware--will need outpatient follow up

## 2019-12-10 NOTE — PROGRESS NOTE ADULT - ASSESSMENT
59M c hx obesity, provoked DVT (~4 yrs ago), recently diagnosed LLE DVT c/b b/l PE (dx'd 2 weeks ago @ [facility]) on xarelto, pw extension of LLE DVT, poss failure of xarelto, currently on heparin gtt bridging to Coumadin.

## 2019-12-10 NOTE — DISCHARGE NOTE NURSING/CASE MANAGEMENT/SOCIAL WORK - PATIENT PORTAL LINK FT
You can access the FollowMyHealth Patient Portal offered by [facility] by registering at the following website: [email or web address]. By joining "[employer]’s FollowMyHealth portal, you will also be able to view your health information using other applications (apps) compatible with our system.

## 2019-12-10 NOTE — DIETITIAN INITIAL EVALUATION ADULT. - NUTRITIONGOAL OUTCOME1
Pt to meet >75% estimated nutrient needs Pt to gradually lose 1-2 pounds/week towards ideal body weight

## 2019-12-12 PROBLEM — Z00.00 ENCOUNTER FOR PREVENTIVE HEALTH EXAMINATION: Status: ACTIVE | Noted: 2019-12-12

## 2019-12-12 PROBLEM — I26.99 OTHER PULMONARY EMBOLISM WITHOUT ACUTE COR PULMONALE: Chronic | Status: ACTIVE | Noted: 2019-12-05

## 2019-12-12 PROBLEM — I82.409 ACUTE EMBOLISM AND THROMBOSIS OF UNSPECIFIED DEEP VEINS OF UNSPECIFIED LOWER EXTREMITY: Chronic | Status: ACTIVE | Noted: 2019-12-05

## 2019-12-12 LAB — PTR INTERPRETATION: SIGNIFICANT CHANGE UP

## 2019-12-20 ENCOUNTER — OUTPATIENT (OUTPATIENT)
Dept: OUTPATIENT SERVICES | Facility: HOSPITAL | Age: 59
LOS: 1 days | Discharge: HOME | End: 2019-12-20

## 2019-12-20 DIAGNOSIS — D64.9 ANEMIA, UNSPECIFIED: ICD-10-CM

## 2019-12-23 ENCOUNTER — RESULT REVIEW (OUTPATIENT)
Age: 59
End: 2019-12-23

## 2019-12-23 ENCOUNTER — APPOINTMENT (OUTPATIENT)
Dept: HEMATOLOGY ONCOLOGY | Facility: CLINIC | Age: 59
End: 2019-12-23
Payer: COMMERCIAL

## 2019-12-23 VITALS
HEART RATE: 57 BPM | DIASTOLIC BLOOD PRESSURE: 75 MMHG | SYSTOLIC BLOOD PRESSURE: 126 MMHG | RESPIRATION RATE: 18 BRPM | TEMPERATURE: 97.6 F | WEIGHT: 315 LBS | HEIGHT: 71.65 IN | BODY MASS INDEX: 43.13 KG/M2 | OXYGEN SATURATION: 98 %

## 2019-12-23 DIAGNOSIS — Z80.42 FAMILY HISTORY OF MALIGNANT NEOPLASM OF PROSTATE: ICD-10-CM

## 2019-12-23 DIAGNOSIS — Z78.9 OTHER SPECIFIED HEALTH STATUS: ICD-10-CM

## 2019-12-23 DIAGNOSIS — I82.409 ACUTE EMBOLISM AND THROMBOSIS OF UNSPECIFIED DEEP VEINS OF UNSPECIFIED LOWER EXTREMITY: ICD-10-CM

## 2019-12-23 LAB
HCT VFR BLD CALC: 36 % — LOW (ref 39–50)
HGB BLD-MCNC: 11.2 G/DL — LOW (ref 13–17)
MCHC RBC-ENTMCNC: 26.2 PG — LOW (ref 27–34)
MCHC RBC-ENTMCNC: 31.2 G/DL — LOW (ref 32–36)
MCV RBC AUTO: 84.1 FL — SIGNIFICANT CHANGE UP (ref 80–100)
PLATELET # BLD AUTO: 183 K/UL — SIGNIFICANT CHANGE UP (ref 150–400)
RBC # BLD: 4.28 M/UL — SIGNIFICANT CHANGE UP (ref 4.2–5.8)
RBC # FLD: 13.8 % — SIGNIFICANT CHANGE UP (ref 10.3–14.5)
WBC # BLD: 5.5 K/UL — SIGNIFICANT CHANGE UP (ref 3.8–10.5)
WBC # FLD AUTO: 5.5 K/UL — SIGNIFICANT CHANGE UP (ref 3.8–10.5)

## 2019-12-23 PROCEDURE — 99214 OFFICE O/P EST MOD 30 MIN: CPT

## 2019-12-30 LAB
APCR PPP: 3.05 RATIO
FVL BLANK: 38.6
FVL TEST: 117.9

## 2019-12-30 NOTE — REVIEW OF SYSTEMS
[Negative] : Heme/Lymph [Lower Ext Edema] : lower extremity edema [Easy Bleeding] : a tendency for easy bleeding [Easy Bruising] : a tendency for easy bruising [Fever] : no fever [Chills] : no chills [Night Sweats] : no night sweats [Fatigue] : no fatigue [Recent Change In Weight] : ~T no recent weight change [Eye Pain] : no eye pain [Red Eyes] : eyes not red [Dry Eyes] : no dryness of the eyes [Vision Problems] : no vision problems [Dysphagia] : no dysphagia [Nosebleeds] : no nosebleeds [Loss of Hearing] : no loss of hearing [Odynophagia] : no odynophagia [Hoarseness] : no hoarseness [Mucosal Pain] : no mucosal pain [Chest Pain] : no chest pain [Palpitations] : no palpitations [Shortness Of Breath] : no shortness of breath [Leg Claudication] : no intermittent leg claudication [Wheezing] : no wheezing [Cough] : no cough [Abdominal Pain] : no abdominal pain [SOB on Exertion] : no shortness of breath during exertion [Vomiting] : no vomiting [Diarrhea] : no diarrhea [Constipation] : no constipation [Dysuria] : no dysuria [Incontinence] : no incontinence [Joint Pain] : no joint pain [Joint Stiffness] : no joint stiffness [Muscle Pain] : no muscle pain [Skin Rash] : no skin rash [Skin Wound] : no skin wound [Suicidal] : not suicidal [Anxiety] : no anxiety [Insomnia] : no insomnia [Depression] : no depression [Proptosis] : no proptosis [Hot Flashes] : no hot flashes [Muscle Weakness] : no muscle weakness [Deepening Of The Voice] : no deepening of the voice [Swollen Glands] : no swollen glands [FreeTextEntry7] : gastric obesity

## 2019-12-30 NOTE — REASON FOR VISIT
[Blood Count Assessment] : blood count assessment [Initial Consultation] : an initial consultation for [FreeTextEntry2] : I am here for a follow up on the deep venous thombosis and pulmonary emoboli

## 2019-12-30 NOTE — CONSULT LETTER
[Dear  ___] : Dear  [unfilled], [Consult Letter:] : I had the pleasure of evaluating your patient, [unfilled]. [Consult Closing:] : Thank you very much for allowing me to participate in the care of this patient.  If you have any questions, please do not hesitate to contact me. [Please see my note below.] : Please see my note below. [Sincerely,] : Sincerely, [FreeTextEntry2] : Garland Delcid\par [address] \par [facility] [address] [FreeTextEntry3] : Michael Marin

## 2019-12-30 NOTE — ASSESSMENT
[Palliative Care Plan] : not applicable at this time [Supportive] : Goals of care discussed with patient: Supportive [FreeTextEntry1] : Mr Naranjo is a 59 year old male seen in hospital follow of a Deep venous thrombosis and pulmonary embolism; He was initially treated at [facility] with DO AC (he does not remember the name) and then was seen at [facility] for persistent leg swelling which resolved after the use of unfractionated heparin. 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 \par He was originally scheduled to see Dr JACKLYN Bartlett the first week in January 2010 but he took the earlier appointment today to obtain results of genetic thrombophilia. He is tested negative for Factor 2 mutation. We will request APC resistance and anticardiolipin antibody testing. he will have follow up with his INR determination with his new PA; result yesterday reported to me by patient was an INR of 2.2. he is not bleeding and he is ambulatory.\par As he lacks a cigarette use history and a known genetic history of familial thrombosis, his risk factors include morbid obesity and immobility. This is his second episode of thrombosis. 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

## 2019-12-30 NOTE — RESULTS/DATA
[FreeTextEntry1] : WBC 5.500 HGB 11.2  000\par patient EMR reviewed and discussed with patient as educational material

## 2019-12-30 NOTE — PHYSICAL EXAM
[Fully active, able to carry on all pre-disease performance without restriction] : Status 0 - Fully active, able to carry on all pre-disease performance without restriction [Obese] : obese [Normal] : normal appearance, no rash, nodules, vesicles, ulcers, erythema [de-identified] : left lower extremity swelling

## 2019-12-30 NOTE — HISTORY OF PRESENT ILLNESS
[Date: ____________] : Patient's last distress assessment performed on [unfilled]. [0 - No Distress] : Distress Level: 0 [Cardiovascular] : Cardiovascular [Constitutional] : Constitutional [ENT] : ENT [Dermatologic] : Dermatologic [Endocrine] : Endocrine [Gastrointestinal] : Gastrointestinal [Genitourinary] : Genitourinary [Gynecologic] : Gynecologic [Infectious] : Infectious [Musculoskeletal] : Musculoskeletal [Neurologic] : Neurologic [Pain] : Pain [Pulmonary] : Pulmonary [Hematologic] : Hematologic [Disease:__________________________] : Disease: [unfilled] [de-identified] : 59 year old male presenting to the office for hematologic care. He is referred here from [facility]. Patient admitted that during the month of November 2019 he drove to [place] and on his return to [place], he felt out of breath. He went to [facility], where bilateral pulmonary emboli and left lower extremity deep venous thrombosis was diagnosed; He was released on Xarelto. However, his left lower extremity swelling worsened and mild SOB with exertion remained persistent. He took himself to [facility] on 12/5/2019 and a repeat duplex study was performed; when compared to the report from [facility], results revealed extension of LLE DVT, concerning for failure of Xarelto. Hematology was consulted and he was started on a heparin drip, then bridged to warfarin and he was discharged on 12/10/2019. While in the hospital his symptoms of increased left LE swelling decreased and his shortness of breath improved. A right lower extremity Doppler was negative for DVT.\par Patient is noted to have a past medical history of morbid obesity (s/p gastric bypass) and PE/DVT (approximately 2015, when he traveled in an aircraft; He was released after treatment, which he took for a few months but does not recall the name of the anticoagulant).  [FreeTextEntry1] : Coumadin 4 mg [de-identified] : Patient currently follows up with his internist's office for INR check. The patietn has less swelling in his leg and less pain

## 2020-01-14 ENCOUNTER — APPOINTMENT (OUTPATIENT)
Dept: HEMATOLOGY ONCOLOGY | Facility: CLINIC | Age: 60
End: 2020-01-14

## 2020-06-30 ENCOUNTER — TRANSCRIPTION ENCOUNTER (OUTPATIENT)
Age: 60
End: 2020-06-30

## 2021-08-19 ENCOUNTER — TRANSCRIPTION ENCOUNTER (OUTPATIENT)
Age: 61
End: 2021-08-19

## 2022-08-23 NOTE — ED ADULT TRIAGE NOTE - WEIGHT METHOD
[facility] GERIATRICS    Chief Complaint   Patient presents with     RECHECK     HPI:  Efrem Ramos is a 81 year old  (1941), who is being seen today for an episodic care visit at: [facility] (TCU) [[id number]].     This is a 80 yo male with a PMHx of Htn, PVD, CAD, MGUS, Aortic stenosis, CVA, chronic anemia who presented with asymptomatic hypotension.  Imaging found new groundglass opacities on CT, subjective of atypical multifocal pneumonia, started on antibiotics and completed on 7/29, developed fever, pulmonology consulted.  CT more looking like chronic aspiration, continued on clindamycin for 1 more week with repeat CT in 6 weeks with PCP.  Per severe dysphagia underwent G-tube placement on 7/28 with tube feeds.  Per hypertension, random cortisol level normal, chronic issue.  Per cardiac disease, echo showed EF 60 to 65%, severe low-flow low gradient aortic valve stenosis with a mean gradient of 23-25, with severe tricuspid regurg, evaluated by cardiology on 7/22 for possible TAVR, will evaluate outpatient.  No other changes, discharged to [facility] for rehab.    Today's concern is:   Weight loss/FTT, diarrhea, therapy progress    Allergies, and PMH/PSH reviewed in [facility] today.  REVIEW OF SYSTEMS:  4 point ROS including Respiratory, CV, GI and , other than that noted in the HPI,  is negative    Objective:   BP 91/55   Pulse 99   Temp 98.7  F (37.1  C)   Resp 18   Wt 57.2 kg (126 lb 1.6 oz)   SpO2 95%   BMI 19.75 kg/m    GENERAL APPEARANCE:  Alert, oriented, thin, denies pain  RESP:  no respiratory distress, diminished with exp whz, RA, non productive cough  CV:  IRR, 3/6 PRAFUL 2ICS, no rub or gallop, no edema  ABDOMEN:  normal bowel sounds, soft, nontender, no hepatosplenomegaly or other masses, has periodic loose stools, PEG patent, TF  PSYCH:  oriented X 3, memory impaired. SLUMS 22/30, safety Q 18/22      Most Recent 3 CBC's:  Recent Labs   Lab Test 08/15/22  0545 08/09/22  1210  08/05/22  0732   WBC 11.8* 14.1* 15.2*   HGB 9.4* 9.2* 9.8*    98 97    469* 420     Most Recent 3 BMP's:  Recent Labs   Lab Test 08/19/22  0505 08/15/22  0545 08/10/22  0903    137 136   POTASSIUM 3.8 3.2* 2.9*   CHLORIDE 104 105 102   CO2 27 28 30   BUN 21 17 17   CR 0.50* 0.52* 0.55*   ANIONGAP 3 4 4   ULISSES 8.1* 8.4* 8.0*   * 87 107*       Assessment/Plan:    (J18.9) Community acquired pneumonia, unspecified laterality    Aspiration pneumonia  Severe dysphagia  Received clindamycin until 8/12, recommend follow-up CT in 6 weeks (mid sept) with PCP.  Received Mucinex 600 mg twice daily x5 days, completed on 8/12.  Encourage incentive spirometry. Per ongoing cough, will order CXR today     (I73.9) PVD (peripheral vascular disease) (H)  (I10) Benign essential hypertension  CAD  CVA  EF 60-70%  Continue statin and plavix. Continue digoxin 125mcg daily, last level 1.1. SBP 90-100s, HR 60-90s     (I48.91) Atrial fibrillation, unspecified type (H)  Continue apixaban 5 mg twice daily, no change     (R62.7) Failure to thrive in adult  Severe dysphagia  Wt loss  PEG placed on 7/28, per continued diarrhea TF changed to promote with fiber with goal of 75cc/day and FW 200cc q4h  and 200cc BID. Continue mirtazapine 15 mg at bedtime.  Speech following, currently NPO. Probable wt loss (-7lb), may pursue palliative approach/hospice     Diarrhea per tube feeds and antibiotic  Hx of colitis  Received Culturelle 10 billion cell count 1 tab three times daily x 1wk per abx, complete on 8/17. C-diff negative.  See above. Also has loperamide 2mg QID PRN. Improved     (R52) Pain  neuropathy  Using Tylenol 1000 mg 3 times daily, lidocaine patch on abdomen.  Continue gabapentin 300 mg in a.m. and 600 mg at at bedtime.  Pain improved, no change     Renal fx  Last creatinine 0.50 with GFR >90 on 8/19     Acute hypokalemia  Last 3.8 on 8/19, using potassium 20mEq daily     (D72.829) Leukocytosis, unspecified  type  Last WBC 11.8 on 8/15 (improving)     (D64.9) Anemia, unspecified type  Last hemoglobin 9.4 on 8/15     Dizziness  Continue meclizine 25 mg twice daily, still dizzy per hypotension at times     (F51.02) Adjustment insomnia  Using melatonin 6 mg at bedtime, adequate. No change     Therapy  weakness  Strengthening, becomes orthostatic. Limited progress. May pursue palliative care/hospice    Orders:  Re-weigh, CXR    Electronically signed by: Patric Sutton NP        stated

## 2022-10-10 ENCOUNTER — NON-APPOINTMENT (OUTPATIENT)
Age: 62
End: 2022-10-10

## 2022-10-13 ENCOUNTER — RX RENEWAL (OUTPATIENT)
Age: 62
End: 2022-10-13

## 2022-11-07 ENCOUNTER — NON-APPOINTMENT (OUTPATIENT)
Age: 62
End: 2022-11-07

## 2022-11-18 DIAGNOSIS — J06.9 ACUTE UPPER RESPIRATORY INFECTION, UNSPECIFIED: ICD-10-CM

## 2022-11-19 ENCOUNTER — RX RENEWAL (OUTPATIENT)
Age: 62
End: 2022-11-19

## 2022-11-21 RX ORDER — WARFARIN 4 MG/1
4 TABLET ORAL DAILY
Qty: 30 | Refills: 0 | Status: DISCONTINUED | COMMUNITY
Start: 2019-12-23 | End: 2022-11-21

## 2022-12-22 DIAGNOSIS — Z23 ENCOUNTER FOR IMMUNIZATION: ICD-10-CM

## 2023-02-24 ENCOUNTER — LABORATORY RESULT (OUTPATIENT)
Age: 63
End: 2023-02-24

## 2023-02-27 ENCOUNTER — LABORATORY RESULT (OUTPATIENT)
Age: 63
End: 2023-02-27

## 2023-03-03 ENCOUNTER — LABORATORY RESULT (OUTPATIENT)
Age: 63
End: 2023-03-03

## 2023-03-06 ENCOUNTER — LABORATORY RESULT (OUTPATIENT)
Age: 63
End: 2023-03-06

## 2023-03-06 NOTE — PATIENT PROFILE ADULT - DO YOU WANT TO COMPLETE THE HCP AND NAME A HEALTH CARE AGENT
Dear Jan Rolle MD     Pati Kumari is being seen on 3/6/2023 in the clinic for a follow up visit.    HISTORY OF PRESENT ILLNESS:    Pati Kumari is a pleasant 80 year old  woman with significant neuropathic lower extremities, probably from idiopathic peripheral neuropathy. She continues to describe her symptoms as numbness, sharp shooting electrical pains, tingling, pins and needles, cramping and burning sensations, knees down to the toes. She also feels a squeezing sensation in the feet.  She reported her neuropathic pain in feet and lower extremities being predominantly at night.  On occasion, she will experience daytime pain when active. She continues to feel a sensation as though she is wearing cement shoes.  She denies any significant paresthesias in her upper extremities. She notes that the electrical pain, burning and pins/needles has improved since starting the gabapentin. She has difficulty sleeping due to the pain 4-7 times per week. She continues on gabapentin 900 mg nightly, alpha-lipoic acid 300 mg BID. She continues on Cymbalta 60 mg in the morning and 30 mg nightly. She denies side effects to these medications. She typically goes to bed about 8, watches television for a while and then wakes up around 830 am.     She denies swallowing concerns. She denies snoring. She denies chest pain. She has chronic atrial fibrillation and takes warfarin. She has some double vision and is scheduled for cataract surgery in April. She denies dizziness and concerns with orthostatic hypotension. She denies bladder dysfunction. She continues to experience balance impairment and this has resulted in a couple falls since last evaluation when attempting to turn quickly. She is considering use of a cane going forward for additional support. She reports generalized arthritis and pain. She has significant depression and anxiety and takes several medications for management of symptoms.      PAST  MEDICAL HISTORY:      Essential (primary) hypertension                              Hypercholesterolemia                                          SVT (supraventricular tachycardia) (CMD)                      Atrial fibrillation (CMD)                                       Comment: intermittent    Neuropathy                                                    Ataxia                                                          Comment: mild    Obesity                                                       Cerebral infarction (CMD)                       2010            Comment: TIA, rt eye vision decreased    Esophageal reflux                                             Malignant neoplasm (CMD)                                        Comment: left breast    Personal history of traumatic fracture                          Comment: lt ankle    Major depressive disorder                                     Breast cancer (CMD)                             03/04/2010      Comment: left lumpectomy    Personal history of radiation therapy                           Comment: right breast lumpectomy    Past Surgical History:   Procedure Laterality Date   • Abdomen surgery      gallbladder 1970s   • Appendectomy     • Atrial ablation surgery  09/12/2008    ablation of atrial fib   • Av node ablation/implant  10/27/2021   • Biopsy of breast, needle core Left 02/08/2010   • Breast lumpectomy  03/04/2010    left, re-excision lumpectomy   • Breast surgery      left   • Cardiac catheterization/possible ptca/possible stent  09/03/2013   • Cholecystectomy     • Colonoscopy diagnostic  06/01/2017    Dr. Jimmy Romero. Normal   • Ep cardioversion  12/10/2013    successful   • Ep cardioversion  07/24/2014   • Fracture surgery      ORIF of lt ankle, plates and pins placed   • Ptca  15 yrs ago   • Removal gallbladder     • [place] lymph node biopsy  03/04/2010    left   • Stress test  08/08/2013    negative for ST depression. Asymptomatic   •  Neri/cardioversion  09/23/2017   • Tonsillectomy and adenoidectomy         Social History     Socioeconomic History   • Marital status:      Spouse name: Not on file   • Number of children: 1   • Years of education: Not on file   • Highest education level: Not on file   Occupational History   • Not on file   Tobacco Use   • Smoking status: Every Day     Packs/day: 0.50     Years: 56.00     Pack years: 28.00     Types: Cigarettes     Start date: 12/10/1965   • Smokeless tobacco: Never   • Tobacco comments:     about 7 daily   Vaping Use   • Vaping Use: never used   Substance and Sexual Activity   • Alcohol use: No     Alcohol/week: 0.0 standard drinks   • Drug use: No   • Sexual activity: Not Currently   Other Topics Concern   •  Service Not Asked   • Blood Transfusions Not Asked   • Caffeine Concern Not Asked   • Occupational Exposure Not Asked   • Hobby Hazards Not Asked   • Sleep Concern Not Asked   • Stress Concern Not Asked   • Weight Concern Not Asked   • Special Diet Not Asked   • Back Care Not Asked   • Exercise Not Asked   • Bike Helmet Not Asked   • Seat Belt Yes   • Self-Exams Yes   Social History Narrative    Patient lives with son. Patient's bedroom is upstairs.      Social Determinants of Health     Financial Resource Strain: Low Risk    • Social Determinants: Financial Resource Strain: None   Food Insecurity: No Food Insecurity   • Social Determinants: Food Insecurity: Never   Transportation Needs: No Transportation Needs   • Lack of Transportation (Medical): No   • Lack of Transportation (Non-Medical): No   Physical Activity: Inactive   • Days of Exercise per Week: 0 days   • Minutes of Exercise per Session: 0 min   Stress: Low Risk    • Social Determinants: Stress: Not at all   Social Connections: Socially Integrated   • Social Determinants: Social Connections: 3 to 5 times a week   Intimate Partner Violence: Not At Risk   • Social Determinants: Intimate Partner Violence Past Fear: No    • Social Determinants: Intimate Partner Violence Current Fear: No       Family History   Problem Relation Age of Onset   • Osteoarthritis Mother    • Heart disease Mother         CAD   • Hypertension Mother    • Stroke Mother    • High blood pressure Mother    • High cholesterol Mother    • Cancer Father         pancreas   • Autoimmune Disease Sister    • Osteoarthritis Sister    • Postmenopausal breast cancer Maternal Grandmother 62   • Cancer Maternal Grandmother         breast   • Heart disease Paternal Grandmother    • Postmenopausal breast cancer Maternal Aunt 50   • Osteoarthritis Maternal Aunt    • Cancer Maternal Aunt         breast   • Heart disease Maternal Aunt        ALLERGIES:   Allergen Reactions   • Sulfa Antibiotics ANAPHYLAXIS       Current Outpatient Medications   Medication Sig Dispense Refill   • alpha-lipoic acid 300 MG capsule Take 300 mg by mouth in the morning and 300 mg in the evening.     • mirtazapine (REMERON) 30 MG tablet TAKE 1 TABLET BY MOUTH AT  NIGHT 90 tablet 1   • metoPROLOL succinate (TOPROL-XL) 25 MG 24 hr tablet Take 1 tablet by mouth daily. 90 tablet 3   • calcitRIOL (ROCALTROL) 0.25 MCG capsule Take 1 capsule by mouth daily. 90 capsule 1   • ferrous sulfate 325 (65 FE) MG tablet Take 1 tablet by mouth daily (with breakfast). 90 tablet 3   • metoCLOPramide (REGLAN) 5 MG tablet Take 1 tablet by mouth 4 times daily as needed for Nausea or Vomiting. 28 tablet 0   • DULoxetine (CYMBALTA) 30 MG capsule Take 1 capsule by mouth every evening. 90 capsule 1   • DULoxetine (CYMBALTA) 60 MG capsule Take 1 capsule by mouth every morning. 90 capsule 1   • divalproex (DEPAKOTE ER) 500 MG 24 hr ER tablet Take 1 tablet by mouth nightly. 90 tablet 1   • clonazePAM (KlonoPIN) 0.5 MG tablet Take 0.5 tablets by mouth 2 times daily as needed for Anxiety. 90 tablet 1   • furosemide (LASIX) 80 MG tablet TAKE 1 TABLET BY MOUTH  TWICE DAILY 180 tablet 3   • potassium chloride (K-TAB) 20 MEQ ER tablet  TAKE 2 TABLETS BY MOUTH  DAILY 180 tablet 3   • gabapentin (NEURONTIN) 300 MG capsule Take 1 Capsule at night first week, and than  two capsule at night from second week (Patient taking differently: 300 mg. Take 1 Capsule at night first week, and than  two capsule at night from second week  10/3/22 Patient reports taking 3 capsules nightly) 180 capsule 3   • ZINC SULFATE PO Take by mouth daily.     • warfarin (COUMADIN) 5 MG tablet Take 5 mg (1 tablet) every Thursday, and 2.5 mg ( half tablet) all other days OR as directed by [facility]. 52 tablet 5   • folic acid (FOLATE) 1 MG tablet Take 1 tablet by mouth daily. 90 tablet 3   • acetaminophen (TYLENOL) 500 MG tablet Take 1,000 mg by mouth every 6 hours as needed for Pain.     • Cholecalciferol 25 mcg (1,000 units) capsule Take 1 capsule by mouth daily. (Patient taking differently: Take 75 mcg by mouth daily.) 90 capsule 3   • aspirin 81 MG chewable tablet Chew 1 tablet by mouth daily. 30 tablet 0   • Multiple Vitamins-Minerals (MULTIVITAMIN PO) Take 1 tablet by mouth daily.       No current facility-administered medications for this visit.       GENERAL REVIEW OF SYSTEM:    Constitutional:  No report of fever or chills   Eyes:  As described above  HENT:  No report of dysphagia  Respiratory:  No report of shortness of breath   Cardiovascular:  No report of chest pain or pedal edema   Gastrointestinal:  No report of  abdominal pain, nausea, vomiting, constipation or diarrhea   Genitourinary:  No report of bladder dysfunction   Musculoskeletal:  No report of  neck or low back pain   Integument:  No report of  rash   Neurologic:  As described above  Endocrine:  No report of  polyuria or polydipsia   Lymphatic:  No report of swollen glands   Psychiatric:   As described above    PHYSICAL EXAM:    The patient is well-nourished and well developed, in no acute distress.  Blood pressure (!) 140/72, weight 109.8 kg (242 lb)..     NEUROLOGICAL EXAMINATION:    The patient  is awake, alert, and oriented x March 6, 2023.  Recent and remote memory appear to be intact.  The patient's speech is fluent and language is intact.  The patient's fund of knowledge is normal.  Pupils are 2.5 mm round, reactive to light.  Funduscopic examination is unremarkable.  Visual fields are full to finger confrontation.  Extraocular movements are normal.  There is no nystagmus.  Nasolabial folds and facial sensation are intact and symmetric.  Tongue and uvula are midline.  Palate moves symmetrically. High frequency hearing is intact bilaterally.  Shrugging of shoulder is symmetric.  There is no pronator drift.  Muscle bulk and tone are normal.  Strength is normal in all extremities.  Glove and stocking distribution is impaired to cold temperature.  Vibration perception is impaired below the knees. Moderate pedal edema noted. No discoloration. Distal pulses intact. Finger-to-nose examination did not reveal any tremors or dysmetria.  Deep tendon reflexes are 2 in both upper and lower extremities.  Gait examination shows normal base and reduced stride with slow, cautious gait.    Romberg is positive.    ASSESSMENT AND PLAN:    Pati Kumari a pleasant 80 year old right-handed  woman with:  1. Idiopathic painful peripheral neuropathy. Her labs are within normal limits. 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 Discussed with her the pathophysiology, prognosis and potential treatment options including the side effects. Unfortunately this is a permanent and progressive condition. The gabapentin has helped with the tingling, pins/needles, burning and sharp shooting pains. Her main complaint is the cramping and squeezing sensations below the knees.   Recommend continuing gabapentin 900 mg nightly and alpha-lipoic acid 300 mg BID. For the cramping pain, recommend baclofen 10 mg nightly and an additional 1/2 -1 tablet  upon waking in the night.    2. Chronic subjective generalized weakness of unclear etiology.   3. Mild gait imbalance, likely due to peripheral neuropathy as well as psychotropic medications. She has suffered multiple falls since last evaluation, typically due to turning quickly, and is considering use of cane going forward.   4. Orthostatic hypotension, symptoms remain in remission.   Follow up in clinic in 3 months, earlier should the need arise sooner.     Thank you for the opportunity to continue to participate in the care of this patient.  STEVE Mccoy seen in conjunction with the attending neurologist, Dr. Matt Torres.    I have examined the patient with Jostin, reviewed medical records and pertinent diagnostic studies, and made treatment plan as described above by Nurse Practitioner.  The patient is reporting improvement in her neuropathic pain in lower extremities.  She reported still getting intermittently mild sharp stabbing pain, burning and tingling but tolerable.  Denies any side effects to gabapentin.  She is also taking alpha lipoic acid.  She is mostly concerned about squeezing to cramping pain in lower extremities at night.  On my examination, her mental status is normal.  Grossly no abnormal cranial nerve or motor exam noted.  She has some swelling in distal lower extremities.  Ankle dorsiflexion is normal.  Recommend continuing on gabapentin 9 mg nightly.  For squeezing pain and cramping of lower extremities at night, she will start gabapentin 10 mg at bedtime.  If needed she will take additional half to 1 tablet in middle of the night on p.r.n. basis.  For now she will continue using alpha lipoic acid.  Also she will continue using Aspercreme over lower extremities on p.r.n. basis as it has helped her symptoms to some extent.  Recommend following up in the clinic in 3 months and should need arise sooner.    Matt Torres MD     Take Tylenol or Advil for Pain/Cramping    Please schedule an appointment to [facility] Dr[facility] Berrios [facility] - [phone] no

## 2023-03-07 ENCOUNTER — LABORATORY RESULT (OUTPATIENT)
Age: 63
End: 2023-03-07

## 2023-03-10 ENCOUNTER — LABORATORY RESULT (OUTPATIENT)
Age: 63
End: 2023-03-10

## 2023-03-13 ENCOUNTER — LABORATORY RESULT (OUTPATIENT)
Age: 63
End: 2023-03-13

## 2023-03-14 ENCOUNTER — LABORATORY RESULT (OUTPATIENT)
Age: 63
End: 2023-03-14

## 2023-03-20 ENCOUNTER — LABORATORY RESULT (OUTPATIENT)
Age: 63
End: 2023-03-20

## 2023-03-21 ENCOUNTER — LABORATORY RESULT (OUTPATIENT)
Age: 63
End: 2023-03-21

## 2023-03-24 ENCOUNTER — LABORATORY RESULT (OUTPATIENT)
Age: 63
End: 2023-03-24

## 2023-03-31 ENCOUNTER — LABORATORY RESULT (OUTPATIENT)
Age: 63
End: 2023-03-31

## 2023-04-07 ENCOUNTER — LABORATORY RESULT (OUTPATIENT)
Age: 63
End: 2023-04-07

## 2023-04-14 ENCOUNTER — LABORATORY RESULT (OUTPATIENT)
Age: 63
End: 2023-04-14

## 2023-04-21 ENCOUNTER — LABORATORY RESULT (OUTPATIENT)
Age: 63
End: 2023-04-21

## 2023-04-28 ENCOUNTER — LABORATORY RESULT (OUTPATIENT)
Age: 63
End: 2023-04-28

## 2023-05-01 ENCOUNTER — APPOINTMENT (OUTPATIENT)
Dept: INTERNAL MEDICINE | Facility: CLINIC | Age: 63
End: 2023-05-01
Payer: COMMERCIAL

## 2023-05-01 VITALS
OXYGEN SATURATION: 98 % | DIASTOLIC BLOOD PRESSURE: 83 MMHG | WEIGHT: 315 LBS | HEIGHT: 73 IN | HEART RATE: 67 BPM | TEMPERATURE: 97.9 F | SYSTOLIC BLOOD PRESSURE: 140 MMHG | BODY MASS INDEX: 41.75 KG/M2

## 2023-05-01 PROCEDURE — 99213 OFFICE O/P EST LOW 20 MIN: CPT

## 2023-05-01 NOTE — PLAN
[FreeTextEntry1] : After discussion with the patient I advised the patient to see his endocrinologist for further treatment of his possible borderline diabetes and obesity\par He was given the name of Dr. Murray al

## 2023-05-02 ENCOUNTER — APPOINTMENT (OUTPATIENT)
Dept: ENDOCRINOLOGY | Facility: CLINIC | Age: 63
End: 2023-05-02
Payer: COMMERCIAL

## 2023-05-02 VITALS
DIASTOLIC BLOOD PRESSURE: 88 MMHG | RESPIRATION RATE: 18 BRPM | WEIGHT: 315 LBS | SYSTOLIC BLOOD PRESSURE: 166 MMHG | HEIGHT: 73 IN | HEART RATE: 66 BPM | BODY MASS INDEX: 41.75 KG/M2 | OXYGEN SATURATION: 99 %

## 2023-05-02 VITALS — SYSTOLIC BLOOD PRESSURE: 152 MMHG | DIASTOLIC BLOOD PRESSURE: 84 MMHG

## 2023-05-02 DIAGNOSIS — E66.01 MORBID (SEVERE) OBESITY DUE TO EXCESS CALORIES: ICD-10-CM

## 2023-05-02 PROCEDURE — 99204 OFFICE O/P NEW MOD 45 MIN: CPT

## 2023-05-02 RX ORDER — WARFARIN SODIUM 5 MG/1
5 TABLET ORAL DAILY
Refills: 0 | Status: DISCONTINUED | COMMUNITY
End: 2023-05-02

## 2023-05-02 RX ORDER — AZITHROMYCIN 250 MG/1
250 TABLET, FILM COATED ORAL
Qty: 1 | Refills: 1 | Status: DISCONTINUED | COMMUNITY
Start: 2022-11-18 | End: 2023-05-02

## 2023-05-05 ENCOUNTER — LABORATORY RESULT (OUTPATIENT)
Age: 63
End: 2023-05-05

## 2023-05-11 ENCOUNTER — NON-APPOINTMENT (OUTPATIENT)
Age: 63
End: 2023-05-11

## 2023-05-12 ENCOUNTER — LABORATORY RESULT (OUTPATIENT)
Age: 63
End: 2023-05-12

## 2023-05-15 ENCOUNTER — LABORATORY RESULT (OUTPATIENT)
Age: 63
End: 2023-05-15

## 2023-05-15 ENCOUNTER — NON-APPOINTMENT (OUTPATIENT)
Age: 63
End: 2023-05-15

## 2023-05-18 ENCOUNTER — RX RENEWAL (OUTPATIENT)
Age: 63
End: 2023-05-18

## 2023-05-18 ENCOUNTER — LABORATORY RESULT (OUTPATIENT)
Age: 63
End: 2023-05-18

## 2023-05-22 ENCOUNTER — NON-APPOINTMENT (OUTPATIENT)
Age: 63
End: 2023-05-22

## 2023-05-25 ENCOUNTER — LABORATORY RESULT (OUTPATIENT)
Age: 63
End: 2023-05-25

## 2023-05-31 DIAGNOSIS — R79.89 OTHER SPECIFIED ABNORMAL FINDINGS OF BLOOD CHEMISTRY: ICD-10-CM

## 2023-05-31 LAB — CORTIS SAL-MCNC: ABNORMAL

## 2023-06-01 ENCOUNTER — LABORATORY RESULT (OUTPATIENT)
Age: 63
End: 2023-06-01

## 2023-06-09 ENCOUNTER — LABORATORY RESULT (OUTPATIENT)
Age: 63
End: 2023-06-09

## 2023-06-14 ENCOUNTER — NON-APPOINTMENT (OUTPATIENT)
Age: 63
End: 2023-06-14

## 2023-06-15 LAB
CORTIS 24H UR-MCNC: 24 H
CORTIS 24H UR-MRATE: 9.2 MCG/24 H
CORTIS SAL-MCNC: NORMAL
SPECIMEN VOL 24H UR: 1050 ML

## 2023-06-16 ENCOUNTER — LABORATORY RESULT (OUTPATIENT)
Age: 63
End: 2023-06-16

## 2023-06-19 ENCOUNTER — LABORATORY RESULT (OUTPATIENT)
Age: 63
End: 2023-06-19

## 2023-06-22 ENCOUNTER — LABORATORY RESULT (OUTPATIENT)
Age: 63
End: 2023-06-22

## 2023-06-29 ENCOUNTER — LABORATORY RESULT (OUTPATIENT)
Age: 63
End: 2023-06-29

## 2023-07-07 ENCOUNTER — LABORATORY RESULT (OUTPATIENT)
Age: 63
End: 2023-07-07

## 2023-07-14 ENCOUNTER — LABORATORY RESULT (OUTPATIENT)
Age: 63
End: 2023-07-14

## 2023-07-17 ENCOUNTER — RX RENEWAL (OUTPATIENT)
Age: 63
End: 2023-07-17

## 2023-07-18 ENCOUNTER — APPOINTMENT (OUTPATIENT)
Dept: INTERNAL MEDICINE | Facility: CLINIC | Age: 63
End: 2023-07-18
Payer: COMMERCIAL

## 2023-07-18 VITALS
DIASTOLIC BLOOD PRESSURE: 84 MMHG | WEIGHT: 315 LBS | OXYGEN SATURATION: 98 % | HEART RATE: 66 BPM | SYSTOLIC BLOOD PRESSURE: 185 MMHG | HEIGHT: 73 IN | TEMPERATURE: 97.8 F | BODY MASS INDEX: 41.75 KG/M2

## 2023-07-18 DIAGNOSIS — M79.89 OTHER SPECIFIED SOFT TISSUE DISORDERS: ICD-10-CM

## 2023-07-18 PROCEDURE — 99214 OFFICE O/P EST MOD 30 MIN: CPT

## 2023-07-18 RX ORDER — WARFARIN 7.5 MG/1
7.5 TABLET ORAL DAILY
Qty: 90 | Refills: 1 | Status: DISCONTINUED | COMMUNITY
Start: 2023-07-17 | End: 2023-07-18

## 2023-07-18 RX ORDER — SEMAGLUTIDE 0.5 MG/.5ML
0.5 INJECTION, SOLUTION SUBCUTANEOUS
Qty: 1 | Refills: 3 | Status: DISCONTINUED | COMMUNITY
Start: 2023-05-02 | End: 2023-07-18

## 2023-07-20 NOTE — PHYSICAL EXAM
[Normal] : normal gait, coordination grossly intact, no focal deficits and deep tendon reflexes were 2+ and symmetric [de-identified] : swelling & tenderness to palpation of the left leg

## 2023-07-20 NOTE — PLAN
[FreeTextEntry1] : Due for upcoming annual exam in 2 months.\par Doppler sonogram ordered to rule out left lower extremity DVT.

## 2023-07-20 NOTE — HISTORY OF PRESENT ILLNESS
[FreeTextEntry1] : Pt c/o pain & swelling of the left calf after banging it 2 days ago. Pt also received a cut in the calf that has since healed into a scab but still has substantial swelling & discoloration of the calf area.

## 2023-07-28 ENCOUNTER — LABORATORY RESULT (OUTPATIENT)
Age: 63
End: 2023-07-28

## 2023-08-04 ENCOUNTER — LABORATORY RESULT (OUTPATIENT)
Age: 63
End: 2023-08-04

## 2023-08-11 ENCOUNTER — LABORATORY RESULT (OUTPATIENT)
Age: 63
End: 2023-08-11

## 2023-08-14 ENCOUNTER — APPOINTMENT (OUTPATIENT)
Dept: INTERNAL MEDICINE | Facility: CLINIC | Age: 63
End: 2023-08-14
Payer: COMMERCIAL

## 2023-08-14 ENCOUNTER — LABORATORY RESULT (OUTPATIENT)
Age: 63
End: 2023-08-14

## 2023-08-14 ENCOUNTER — INPATIENT (INPATIENT)
Facility: HOSPITAL | Age: 63
LOS: 4 days | Discharge: HOME | DRG: 309 | End: 2023-08-19
Attending: STUDENT IN AN ORGANIZED HEALTH CARE EDUCATION/TRAINING PROGRAM | Admitting: INTERNAL MEDICINE
Payer: COMMERCIAL

## 2023-08-14 VITALS
OXYGEN SATURATION: 99 % | RESPIRATION RATE: 19 BRPM | HEART RATE: 141 BPM | TEMPERATURE: 98 F | DIASTOLIC BLOOD PRESSURE: 91 MMHG | WEIGHT: 315 LBS | SYSTOLIC BLOOD PRESSURE: 142 MMHG | HEIGHT: 73 IN

## 2023-08-14 DIAGNOSIS — Z86.718 PERSONAL HISTORY OF OTHER VENOUS THROMBOSIS AND EMBOLISM: ICD-10-CM

## 2023-08-14 DIAGNOSIS — Z00.00 ENCOUNTER FOR GENERAL ADULT MEDICAL EXAMINATION WITHOUT ABNORMAL FINDINGS: ICD-10-CM

## 2023-08-14 DIAGNOSIS — I48.92 UNSPECIFIED ATRIAL FLUTTER: ICD-10-CM

## 2023-08-14 DIAGNOSIS — I48.91 UNSPECIFIED ATRIAL FIBRILLATION: ICD-10-CM

## 2023-08-14 DIAGNOSIS — Z98.84 BARIATRIC SURGERY STATUS: Chronic | ICD-10-CM

## 2023-08-14 DIAGNOSIS — Z90.3 ACQUIRED ABSENCE OF STOMACH [PART OF]: Chronic | ICD-10-CM

## 2023-08-14 DIAGNOSIS — D64.9 ANEMIA, UNSPECIFIED: ICD-10-CM

## 2023-08-14 LAB
ALBUMIN SERPL ELPH-MCNC: 3.6 G/DL — SIGNIFICANT CHANGE UP (ref 3.3–5)
ALP SERPL-CCNC: 94 U/L — SIGNIFICANT CHANGE UP (ref 40–120)
ALT FLD-CCNC: 23 U/L — SIGNIFICANT CHANGE UP (ref 12–78)
ANION GAP SERPL CALC-SCNC: 8 MMOL/L — SIGNIFICANT CHANGE UP (ref 5–17)
AST SERPL-CCNC: 16 U/L — SIGNIFICANT CHANGE UP (ref 15–37)
BASOPHILS # BLD AUTO: 0.03 K/UL — SIGNIFICANT CHANGE UP (ref 0–0.2)
BASOPHILS NFR BLD AUTO: 0.5 % — SIGNIFICANT CHANGE UP (ref 0–2)
BILIRUB SERPL-MCNC: 0.7 MG/DL — SIGNIFICANT CHANGE UP (ref 0.2–1.2)
BUN SERPL-MCNC: 20 MG/DL — SIGNIFICANT CHANGE UP (ref 7–23)
CALCIUM SERPL-MCNC: 8.6 MG/DL — SIGNIFICANT CHANGE UP (ref 8.5–10.1)
CHLORIDE SERPL-SCNC: 106 MMOL/L — SIGNIFICANT CHANGE UP (ref 96–108)
CO2 SERPL-SCNC: 26 MMOL/L — SIGNIFICANT CHANGE UP (ref 22–31)
CREAT SERPL-MCNC: 0.94 MG/DL — SIGNIFICANT CHANGE UP (ref 0.5–1.3)
D DIMER BLD IA.RAPID-MCNC: <150 NG/ML DDU — SIGNIFICANT CHANGE UP
EGFR: 93 ML/MIN/1.73M2 — SIGNIFICANT CHANGE UP
EOSINOPHIL # BLD AUTO: 0.1 K/UL — SIGNIFICANT CHANGE UP (ref 0–0.5)
EOSINOPHIL NFR BLD AUTO: 1.7 % — SIGNIFICANT CHANGE UP (ref 0–6)
GLUCOSE SERPL-MCNC: 100 MG/DL — HIGH (ref 70–99)
HCT VFR BLD CALC: 36 % — LOW (ref 39–50)
HGB BLD-MCNC: 10.8 G/DL — LOW (ref 13–17)
IMM GRANULOCYTES NFR BLD AUTO: 0.3 % — SIGNIFICANT CHANGE UP (ref 0–0.9)
INR BLD: 1.99 RATIO — HIGH (ref 0.85–1.18)
LYMPHOCYTES # BLD AUTO: 1.51 K/UL — SIGNIFICANT CHANGE UP (ref 1–3.3)
LYMPHOCYTES # BLD AUTO: 25.5 % — SIGNIFICANT CHANGE UP (ref 13–44)
MCHC RBC-ENTMCNC: 24.1 PG — LOW (ref 27–34)
MCHC RBC-ENTMCNC: 30 GM/DL — LOW (ref 32–36)
MCV RBC AUTO: 80.2 FL — SIGNIFICANT CHANGE UP (ref 80–100)
MONOCYTES # BLD AUTO: 0.65 K/UL — SIGNIFICANT CHANGE UP (ref 0–0.9)
MONOCYTES NFR BLD AUTO: 11 % — SIGNIFICANT CHANGE UP (ref 2–14)
NEUTROPHILS # BLD AUTO: 3.62 K/UL — SIGNIFICANT CHANGE UP (ref 1.8–7.4)
NEUTROPHILS NFR BLD AUTO: 61 % — SIGNIFICANT CHANGE UP (ref 43–77)
NRBC # BLD: 0 /100 WBCS — SIGNIFICANT CHANGE UP (ref 0–0)
NT-PROBNP SERPL-SCNC: 2468 PG/ML — HIGH (ref 0–125)
PLATELET # BLD AUTO: 271 K/UL — SIGNIFICANT CHANGE UP (ref 150–400)
POTASSIUM SERPL-MCNC: 3.9 MMOL/L — SIGNIFICANT CHANGE UP (ref 3.5–5.3)
POTASSIUM SERPL-SCNC: 3.9 MMOL/L — SIGNIFICANT CHANGE UP (ref 3.5–5.3)
PROT SERPL-MCNC: 7.2 G/DL — SIGNIFICANT CHANGE UP (ref 6–8.3)
PROTHROM AB SERPL-ACNC: 22.8 SEC — HIGH (ref 9.5–13)
RBC # BLD: 4.49 M/UL — SIGNIFICANT CHANGE UP (ref 4.2–5.8)
RBC # FLD: 18.8 % — HIGH (ref 10.3–14.5)
SODIUM SERPL-SCNC: 140 MMOL/L — SIGNIFICANT CHANGE UP (ref 135–145)
TROPONIN I, HIGH SENSITIVITY RESULT: 25.9 NG/L — SIGNIFICANT CHANGE UP
TROPONIN I, HIGH SENSITIVITY RESULT: 27 NG/L — SIGNIFICANT CHANGE UP
TSH SERPL-MCNC: 2.78 UIU/ML — SIGNIFICANT CHANGE UP (ref 0.36–3.74)
WBC # BLD: 5.93 K/UL — SIGNIFICANT CHANGE UP (ref 3.8–10.5)
WBC # FLD AUTO: 5.93 K/UL — SIGNIFICANT CHANGE UP (ref 3.8–10.5)

## 2023-08-14 PROCEDURE — 99223 1ST HOSP IP/OBS HIGH 75: CPT | Mod: GC

## 2023-08-14 PROCEDURE — 93000 ELECTROCARDIOGRAM COMPLETE: CPT

## 2023-08-14 PROCEDURE — 99285 EMERGENCY DEPT VISIT HI MDM: CPT

## 2023-08-14 PROCEDURE — 71045 X-RAY EXAM CHEST 1 VIEW: CPT | Mod: 26,77

## 2023-08-14 PROCEDURE — 71045 X-RAY EXAM CHEST 1 VIEW: CPT | Mod: 26

## 2023-08-14 PROCEDURE — 99214 OFFICE O/P EST MOD 30 MIN: CPT | Mod: 25

## 2023-08-14 PROCEDURE — 99223 1ST HOSP IP/OBS HIGH 75: CPT

## 2023-08-14 RX ORDER — AMIODARONE HYDROCHLORIDE 400 MG/1
150 TABLET ORAL ONCE
Refills: 0 | Status: COMPLETED | OUTPATIENT
Start: 2023-08-14 | End: 2023-08-14

## 2023-08-14 RX ORDER — WARFARIN SODIUM 2.5 MG/1
7.5 TABLET ORAL ONCE
Refills: 0 | Status: COMPLETED | OUTPATIENT
Start: 2023-08-14 | End: 2023-08-14

## 2023-08-14 RX ORDER — SODIUM CHLORIDE 9 MG/ML
1000 INJECTION INTRAMUSCULAR; INTRAVENOUS; SUBCUTANEOUS ONCE
Refills: 0 | Status: COMPLETED | OUTPATIENT
Start: 2023-08-14 | End: 2023-08-14

## 2023-08-14 RX ORDER — WARFARIN SODIUM 2.5 MG/1
1 TABLET ORAL
Refills: 0 | DISCHARGE

## 2023-08-14 RX ORDER — AMIODARONE HYDROCHLORIDE 400 MG/1
1 TABLET ORAL
Qty: 450 | Refills: 0 | Status: DISCONTINUED | OUTPATIENT
Start: 2023-08-14 | End: 2023-08-15

## 2023-08-14 RX ORDER — LANOLIN ALCOHOL/MO/W.PET/CERES
3 CREAM (GRAM) TOPICAL AT BEDTIME
Refills: 0 | Status: DISCONTINUED | OUTPATIENT
Start: 2023-08-14 | End: 2023-08-19

## 2023-08-14 RX ORDER — DILTIAZEM HCL 120 MG
10 CAPSULE, EXT RELEASE 24 HR ORAL ONCE
Refills: 0 | Status: COMPLETED | OUTPATIENT
Start: 2023-08-14 | End: 2023-08-14

## 2023-08-14 RX ORDER — AMIODARONE HYDROCHLORIDE 400 MG/1
0.5 TABLET ORAL
Qty: 450 | Refills: 0 | Status: DISCONTINUED | OUTPATIENT
Start: 2023-08-14 | End: 2023-08-14

## 2023-08-14 RX ADMIN — WARFARIN SODIUM 7.5 MILLIGRAM(S): 2.5 TABLET ORAL at 17:58

## 2023-08-14 RX ADMIN — AMIODARONE HYDROCHLORIDE 150 MILLIGRAM(S): 400 TABLET ORAL at 13:55

## 2023-08-14 RX ADMIN — AMIODARONE HYDROCHLORIDE 600 MILLIGRAM(S): 400 TABLET ORAL at 13:43

## 2023-08-14 RX ADMIN — Medication 10 MILLIGRAM(S): at 12:51

## 2023-08-14 RX ADMIN — SODIUM CHLORIDE 1000 MILLILITER(S): 9 INJECTION INTRAMUSCULAR; INTRAVENOUS; SUBCUTANEOUS at 18:34

## 2023-08-14 RX ADMIN — SODIUM CHLORIDE 1000 MILLILITER(S): 9 INJECTION INTRAMUSCULAR; INTRAVENOUS; SUBCUTANEOUS at 13:06

## 2023-08-14 RX ADMIN — AMIODARONE HYDROCHLORIDE 33.3 MG/MIN: 400 TABLET ORAL at 13:59

## 2023-08-14 NOTE — ED ADULT NURSE NOTE - NSFALLUNIVINTERV_ED_ALL_ED
Bed/Stretcher in lowest position, wheels locked, appropriate side rails in place/Call bell, personal items and telephone in reach/Instruct patient to call for assistance before getting out of bed/chair/stretcher/Non-slip footwear applied when patient is off stretcher/[place] to call system/Physically safe environment - no spills, clutter or unnecessary equipment/Purposeful proactive rounding/Room/bathroom lighting operational, light cord in reach

## 2023-08-14 NOTE — H&P ADULT - NSHPSOCIALHISTORY_GEN_ALL_CORE
denies tobacco use   drinks 1-2 drinks once a month  denies illicit substance use  denies herbal or natural substance use  retired nurse

## 2023-08-14 NOTE — H&P ADULT - PROBLEM SELECTOR PLAN 3
PMH LLE DVT and b/l PE in 2019, on warfarin 7.5mg daily at night, INR on admission 1.99    - f/u AM INR daily   - give warfarin as per INR

## 2023-08-14 NOTE — ED PROVIDER NOTE - NS ED ATTENDING STATEMENT MOD
This was a shared visit with the STEVEN. I reviewed and verified the documentation and independently performed the documented:

## 2023-08-14 NOTE — H&P ADULT - ASSESSMENT
63yo M with PMH DVT of LLE and b/l PE (2019) on lifetime warfarin, s/p gastric bypass and gastric sleeve presents to the ED on 8/14/23 due to abnormal EKG in outpt office, admitted to medicine for management of afib RVR.

## 2023-08-14 NOTE — H&P ADULT - ATTENDING COMMENTS
63yo M with PMH DVT of LLE and b/l PE (2019) on lifetime warfarin, s/p gastric bypass and gastric sleeve presents to the ED on 8/14/23 due to abnormal EKG in outpt office, admitted to medicine for management of afib RVR.     HPI as above.     T(C): 36.6 (08-14-23 @ 12:22), Max: 36.6 (08-14-23 @ 12:22)  HR: 132 (08-14-23 @ 13:40) (132 - 141)  BP: 118/71 (08-14-23 @ 13:40) (118/71 - 142/91)  RR: 17 (08-14-23 @ 13:40) (17 - 19)  SpO2: 97% (08-14-23 @ 13:40) (97% - 99%)  Wt(kg): --    Physical Exam:   GENERAL: well-groomed, well-developed, NAD  HEENT: head NC/AT;  conjunctiva & sclera clear; hearing grossly intact, moist mucous membranes  NECK: supple, no JVD  RESPIRATORY: CTA B/L, no wheezing, rales, rhonchi or rubs  CARDIOVASCULAR: S1&S2, irreg irreg  ABDOMEN: soft, non-tender, non-distended, + Bowel sounds x4 quadrants, no guarding, rebound or rigidity  MUSCULOSKELETAL:  chronic LLE edema greater than RLE which he states has been present since his DVT in 2019 [non-pitting]  LYMPH: no cervical lymphadenopathy  VASCULAR: Radial pulses 2+ bilaterally, no varicose veins   SKIN: warm and dry, color normal  NEUROLOGIC: AA&O x3 speech fluent  Psych: Normal mood and affect, normal behavior    Plan:   Continue AMiodarone, monitor HR  -admit to tele  -continue coumadin  -monitor INR  Anemia: no signs or symptoms of active bleeding. Continue to monitor hgb.     DVT ppx: on warfarin

## 2023-08-14 NOTE — H&P ADULT - NSHPPHYSICALEXAM_GEN_ALL_CORE
T(C): 36.6 (08-14-23 @ 12:22), Max: 36.6 (08-14-23 @ 12:22)  HR: 132 (08-14-23 @ 13:40) (132 - 141)  BP: 118/71 (08-14-23 @ 13:40) (118/71 - 142/91)  RR: 17 (08-14-23 @ 13:40) (17 - 19)  SpO2: 97% (08-14-23 @ 13:40) (97% - 99%)    GENERAL: patient appears well, no acute distress, appropriately interactive  EYES: sclera clear, no exudates  NECK: supple, soft  LUNGS: good air entry bilaterally, clear to auscultation, symmetric breath sounds, no wheezing or rhonchi appreciated  HEART: soft S1/S2, irregular rhythm and tachycardic, no murmurs noted, no carotid bruits, LLE swelling nonpitting  GASTROINTESTINAL: abdomen is soft, nontender, nondistended, normoactive bowel sounds  INTEGUMENT: good skin turgor, warm skin, appears well perfused  MUSCULOSKELETAL: no clubbing or cyanosis, no obvious deformity  NEUROLOGIC: awake, alert, oriented x3, good muscle tone in all 4 extremities  HEME/LYMPH: no obvious ecchymosis or petechiae
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
## 2023-08-14 NOTE — ED PROVIDER NOTE - CLINICAL SUMMARY MEDICAL DECISION MAKING FREE TEXT BOX
62-year-old male with significant past medical history for hypertension presents to the ED from cardiology office Dr. Valdes for new onset atrial flutter.  Patient states over the past couple of days he has noticed his heart pounding and feeling as if he was going to pass out.  No previous history of A-fib or a flutter.  Patient is on Coumadin for history of DVT.  Patient well-appearing tacky a flutter on EKG.  Cardiac work-up admit for further evaluation and treatment.
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
## 2023-08-14 NOTE — H&P ADULT - PROBLEM SELECTOR PLAN 1
Pt with palpitations x3 days, 1x episode of lightheadedness, presenting from office due to EKG with a-flutter . On admission EKG showing afib/flutter with SVT . s/p cardizem 10mg IVP, amiodarone 150mg IVPB    - continue on amiodarone drip 1mg/kg/hr for 6hrs, then 0.5mg/kg/hr for 18hrs  - f/u cardio recs (Dr Valdes)  - f/u TSH  - f/u TTE Pt with palpitations x3 days, 1x episode of lightheadedness, presenting from office due to EKG with a-flutter . On admission EKG showing afib/flutter with SVT . s/p cardizem 10mg IVP, amiodarone 150mg IVPB    - continue on amiodarone drip as ordered  -patient is already on warfarin due to hx of PE/DVT, INR today is 1.99. Give Warfarin 7.5mg tonight  - f/u cardio recs (Dr Valdes)  - f/u TSH  - f/u TTE

## 2023-08-14 NOTE — H&P ADULT - ATTENDING SUPERVISION STATEMENT
A potentially serious (but relatively infrequent) side effect of several heart rhythm medications, especially    Amiodarone (Pacerone, Cordarone)  Sotalol (Betapace)  Dofetilide (Tikosyn)  Disopyramide (Norpace)  Dronedarone (Multaq)    is an effect on your ECG (EKG) (electrocardiogram) resulting in an abnormality called QT Interval Prolongation. This condition may increase your risk of dangerous (potentially life-threatening) arrhythmias arising in the lower chamber (ventricle) of the heart, also referred to as ventricular tachycardia or Torsades de Pointes.    The risk of this dangerous arrhythmia is particularly increased if the above-mentioned medications are taken along-with some other non-heart-related medications (including some common antibiotics, pain medications, allergy medications, etc.). Therefore it is critical that, if you are on any of the above-mentioned heart rhythm medications, you inform all your prescribing physicians and allied health professionals (including, but not limited to PAs, NPs, RNs, MAs, pharmacists, etc)  that you are on such medications so that drug-drug interactions can be avoided.     For a comprehensive list of drugs that may cause QT interval prolongation, please talk to your pharmacist or visit [email or web address]      Consider pacemaker implant for continued fatigue.     Resident

## 2023-08-14 NOTE — H&P ADULT - NSHPREVIEWOFSYSTEMS_GEN_ALL_CORE
REVIEW OF SYSTEMS:    CONSTITUTIONAL: No weakness, fevers or chills  EYES/ENT: No visual changes;  No vertigo or throat pain   NECK: No pain or stiffness  RESPIRATORY: No cough, wheezing, hemoptysis; No shortness of breath  CARDIOVASCULAR: No chest pain. +palpitations  GASTROINTESTINAL: No abdominal or epigastric pain. No nausea, vomiting, or hematemesis; No diarrhea or constipation. No melena or hematochezia.  GENITOURINARY: No dysuria, frequency or hematuria  NEUROLOGICAL: No numbness or weakness  SKIN: No itching, rashes

## 2023-08-14 NOTE — ED ADULT NURSE NOTE - OBJECTIVE STATEMENT
Pt. received alert and oriented x3 with chief complaint of palpitations for three days w/ near syncope while at [employer] 1 day ago. Pt. placed on continuous cardiac monitor with continuous pulse ox. EKG performed at bedside upon arrival.

## 2023-08-14 NOTE — CONSULT NOTE ADULT - ASSESSMENT
pt with new onset of atrial flutter  s/p dvt 2019 and pe- on coumadin  s/p gatric bypassand gstric sleeve  pt is started on amiodarone -if pt does not convert to rsr- pt will need dc cardoversion-discussed with pt and family

## 2023-08-14 NOTE — ED PROVIDER NOTE - OBJECTIVE STATEMENT
62 M hx dvt/pe (on warfarin) sent by dr merchant due to atrial flutter. Pt went to see pmd dr stewart due to palpitations he started feeling a few days ago, near-syncope yesterday. Denies prior hx of afib/aflutter. Denies cp, sob, leg swelling, f/c, abd pain, n/v. About 1.5 months ago started sexanda medication for weight loss, states dosage gradually increases. Otherwise no new or changed medications. Occasional etoh. Denies smoking or other drug use.

## 2023-08-14 NOTE — H&P ADULT - PROBLEM SELECTOR PLAN 4
DVT ppx: continue warfarin 7.5mg daily as per daily INR DVT ppx: continue warfarin 7.5mg daily as per daily INR    Abnormal CXR: Borderline cardiomegaly and slight prominence to perihilar   interstitial markings less obvious than on previous exam. Positive   findings probably related to the portable technique  -check CXR in AM

## 2023-08-14 NOTE — H&P ADULT - NSVTERISKREFERASSESS_GEN_ALL_CORE
Refer to the Assessment tab to view/cancel completed assessment. moderate assist (50% patients effort)

## 2023-08-15 ENCOUNTER — LABORATORY RESULT (OUTPATIENT)
Age: 63
End: 2023-08-15

## 2023-08-15 DIAGNOSIS — I48.92 UNSPECIFIED ATRIAL FLUTTER: ICD-10-CM

## 2023-08-15 LAB
A1C WITH ESTIMATED AVERAGE GLUCOSE RESULT: 5.7 % — HIGH (ref 4–5.6)
ALBUMIN SERPL ELPH-MCNC: 2.9 G/DL — LOW (ref 3.3–5)
ALBUMIN SERPL ELPH-MCNC: 3.6 G/DL — SIGNIFICANT CHANGE UP (ref 3.3–5)
ALP SERPL-CCNC: 78 U/L — SIGNIFICANT CHANGE UP (ref 40–120)
ALP SERPL-CCNC: 91 U/L — SIGNIFICANT CHANGE UP (ref 40–120)
ALT FLD-CCNC: 17 U/L — SIGNIFICANT CHANGE UP (ref 12–78)
ALT FLD-CCNC: 21 U/L — SIGNIFICANT CHANGE UP (ref 12–78)
ANION GAP SERPL CALC-SCNC: 5 MMOL/L — SIGNIFICANT CHANGE UP (ref 5–17)
ANION GAP SERPL CALC-SCNC: 6 MMOL/L — SIGNIFICANT CHANGE UP (ref 5–17)
APTT BLD: 31.4 SEC — SIGNIFICANT CHANGE UP (ref 24.5–35.6)
AST SERPL-CCNC: 13 U/L — LOW (ref 15–37)
AST SERPL-CCNC: 15 U/L — SIGNIFICANT CHANGE UP (ref 15–37)
BASOPHILS # BLD AUTO: 0.02 K/UL — SIGNIFICANT CHANGE UP (ref 0–0.2)
BASOPHILS NFR BLD AUTO: 0.5 % — SIGNIFICANT CHANGE UP (ref 0–2)
BILIRUB DIRECT SERPL-MCNC: 0.2 MG/DL — SIGNIFICANT CHANGE UP (ref 0–0.3)
BILIRUB INDIRECT FLD-MCNC: 0.4 MG/DL — SIGNIFICANT CHANGE UP (ref 0.2–1)
BILIRUB SERPL-MCNC: 0.6 MG/DL — SIGNIFICANT CHANGE UP (ref 0.2–1.2)
BILIRUB SERPL-MCNC: 0.6 MG/DL — SIGNIFICANT CHANGE UP (ref 0.2–1.2)
BUN SERPL-MCNC: 16 MG/DL — SIGNIFICANT CHANGE UP (ref 7–23)
BUN SERPL-MCNC: 19 MG/DL — SIGNIFICANT CHANGE UP (ref 7–23)
CALCIUM SERPL-MCNC: 8.2 MG/DL — LOW (ref 8.5–10.1)
CALCIUM SERPL-MCNC: 8.7 MG/DL — SIGNIFICANT CHANGE UP (ref 8.5–10.1)
CHLORIDE SERPL-SCNC: 106 MMOL/L — SIGNIFICANT CHANGE UP (ref 96–108)
CHLORIDE SERPL-SCNC: 109 MMOL/L — HIGH (ref 96–108)
CHOLEST SERPL-MCNC: 152 MG/DL — SIGNIFICANT CHANGE UP
CO2 SERPL-SCNC: 29 MMOL/L — SIGNIFICANT CHANGE UP (ref 22–31)
CO2 SERPL-SCNC: 29 MMOL/L — SIGNIFICANT CHANGE UP (ref 22–31)
CREAT SERPL-MCNC: 0.74 MG/DL — SIGNIFICANT CHANGE UP (ref 0.5–1.3)
CREAT SERPL-MCNC: 1 MG/DL — SIGNIFICANT CHANGE UP (ref 0.5–1.3)
EGFR: 104 ML/MIN/1.73M2 — SIGNIFICANT CHANGE UP
EGFR: 86 ML/MIN/1.73M2 — SIGNIFICANT CHANGE UP
EOSINOPHIL # BLD AUTO: 0.11 K/UL — SIGNIFICANT CHANGE UP (ref 0–0.5)
EOSINOPHIL NFR BLD AUTO: 2.6 % — SIGNIFICANT CHANGE UP (ref 0–6)
ESTIMATED AVERAGE GLUCOSE: 117 MG/DL — HIGH (ref 68–114)
FERRITIN SERPL-MCNC: 14 NG/ML — LOW (ref 30–400)
FOLATE SERPL-MCNC: 13.7 NG/ML — SIGNIFICANT CHANGE UP
FOLATE SERPL-MCNC: 15.7 NG/ML — SIGNIFICANT CHANGE UP
GLUCOSE SERPL-MCNC: 92 MG/DL — SIGNIFICANT CHANGE UP (ref 70–99)
GLUCOSE SERPL-MCNC: 99 MG/DL — SIGNIFICANT CHANGE UP (ref 70–99)
HCT VFR BLD CALC: 30.3 % — LOW (ref 39–50)
HDLC SERPL-MCNC: 76 MG/DL — SIGNIFICANT CHANGE UP
HGB BLD-MCNC: 9.5 G/DL — LOW (ref 13–17)
IMM GRANULOCYTES NFR BLD AUTO: 0.2 % — SIGNIFICANT CHANGE UP (ref 0–0.9)
INR BLD: 1.88 RATIO — HIGH (ref 0.85–1.18)
IRON SATN MFR SERPL: 12 % — LOW (ref 16–55)
IRON SATN MFR SERPL: 27 UG/DL — LOW (ref 45–165)
IRON SATN MFR SERPL: 47 UG/DL — SIGNIFICANT CHANGE UP (ref 45–165)
IRON SATN MFR SERPL: 8 % — LOW (ref 16–55)
LIPID PNL WITH DIRECT LDL SERPL: 67 MG/DL — SIGNIFICANT CHANGE UP
LYMPHOCYTES # BLD AUTO: 1.41 K/UL — SIGNIFICANT CHANGE UP (ref 1–3.3)
LYMPHOCYTES # BLD AUTO: 33.4 % — SIGNIFICANT CHANGE UP (ref 13–44)
MAGNESIUM SERPL-MCNC: 2.1 MG/DL — SIGNIFICANT CHANGE UP (ref 1.6–2.6)
MCHC RBC-ENTMCNC: 24.7 PG — LOW (ref 27–34)
MCHC RBC-ENTMCNC: 31.4 GM/DL — LOW (ref 32–36)
MCV RBC AUTO: 78.9 FL — LOW (ref 80–100)
MONOCYTES # BLD AUTO: 0.41 K/UL — SIGNIFICANT CHANGE UP (ref 0–0.9)
MONOCYTES NFR BLD AUTO: 9.7 % — SIGNIFICANT CHANGE UP (ref 2–14)
NEUTROPHILS # BLD AUTO: 2.26 K/UL — SIGNIFICANT CHANGE UP (ref 1.8–7.4)
NEUTROPHILS NFR BLD AUTO: 53.6 % — SIGNIFICANT CHANGE UP (ref 43–77)
NON HDL CHOLESTEROL: 76 MG/DL — SIGNIFICANT CHANGE UP
NRBC # BLD: 0 /100 WBCS — SIGNIFICANT CHANGE UP (ref 0–0)
PHOSPHATE SERPL-MCNC: 2.7 MG/DL — SIGNIFICANT CHANGE UP (ref 2.5–4.5)
PLATELET # BLD AUTO: 218 K/UL — SIGNIFICANT CHANGE UP (ref 150–400)
POTASSIUM SERPL-MCNC: 3.6 MMOL/L — SIGNIFICANT CHANGE UP (ref 3.5–5.3)
POTASSIUM SERPL-MCNC: 3.7 MMOL/L — SIGNIFICANT CHANGE UP (ref 3.5–5.3)
POTASSIUM SERPL-SCNC: 3.6 MMOL/L — SIGNIFICANT CHANGE UP (ref 3.5–5.3)
POTASSIUM SERPL-SCNC: 3.7 MMOL/L — SIGNIFICANT CHANGE UP (ref 3.5–5.3)
PROT SERPL-MCNC: 5.8 G/DL — LOW (ref 6–8.3)
PROT SERPL-MCNC: 7.3 G/DL — SIGNIFICANT CHANGE UP (ref 6–8.3)
PROTHROM AB SERPL-ACNC: 21.6 SEC — HIGH (ref 9.5–13)
RBC # BLD: 3.84 M/UL — LOW (ref 4.2–5.8)
RBC # FLD: 18.4 % — HIGH (ref 10.3–14.5)
SODIUM SERPL-SCNC: 140 MMOL/L — SIGNIFICANT CHANGE UP (ref 135–145)
SODIUM SERPL-SCNC: 144 MMOL/L — SIGNIFICANT CHANGE UP (ref 135–145)
TIBC SERPL-MCNC: 345 UG/DL — SIGNIFICANT CHANGE UP (ref 220–430)
TIBC SERPL-MCNC: 394 UG/DL — SIGNIFICANT CHANGE UP (ref 220–430)
TRIGL SERPL-MCNC: 38 MG/DL — SIGNIFICANT CHANGE UP
TROPONIN I, HIGH SENSITIVITY RESULT: 30.5 NG/L — SIGNIFICANT CHANGE UP
UIBC SERPL-MCNC: 318 UG/DL — SIGNIFICANT CHANGE UP (ref 110–370)
UIBC SERPL-MCNC: 347 UG/DL — SIGNIFICANT CHANGE UP (ref 110–370)
VIT B12 SERPL-MCNC: 510 PG/ML — SIGNIFICANT CHANGE UP (ref 232–1245)
VIT B12 SERPL-MCNC: 560 PG/ML — SIGNIFICANT CHANGE UP (ref 232–1245)
WBC # BLD: 4.22 K/UL — SIGNIFICANT CHANGE UP (ref 3.8–10.5)
WBC # FLD AUTO: 4.22 K/UL — SIGNIFICANT CHANGE UP (ref 3.8–10.5)

## 2023-08-15 PROCEDURE — 99233 SBSQ HOSP IP/OBS HIGH 50: CPT | Mod: GC

## 2023-08-15 PROCEDURE — 99233 SBSQ HOSP IP/OBS HIGH 50: CPT

## 2023-08-15 PROCEDURE — 93010 ELECTROCARDIOGRAM REPORT: CPT

## 2023-08-15 RX ORDER — AMIODARONE HYDROCHLORIDE 400 MG/1
0.5 TABLET ORAL
Qty: 450 | Refills: 0 | Status: DISCONTINUED | OUTPATIENT
Start: 2023-08-15 | End: 2023-08-18

## 2023-08-15 RX ORDER — DILTIAZEM HCL 120 MG
10 CAPSULE, EXT RELEASE 24 HR ORAL ONCE
Refills: 0 | Status: COMPLETED | OUTPATIENT
Start: 2023-08-15 | End: 2023-08-15

## 2023-08-15 RX ORDER — WARFARIN SODIUM 2.5 MG/1
8 TABLET ORAL ONCE
Refills: 0 | Status: COMPLETED | OUTPATIENT
Start: 2023-08-15 | End: 2023-08-15

## 2023-08-15 RX ORDER — IRON SUCROSE 20 MG/ML
100 INJECTION, SOLUTION INTRAVENOUS EVERY 24 HOURS
Refills: 0 | Status: DISCONTINUED | OUTPATIENT
Start: 2023-08-15 | End: 2023-08-17

## 2023-08-15 RX ORDER — FOLIC ACID 0.8 MG
1 TABLET ORAL DAILY
Refills: 0 | Status: DISCONTINUED | OUTPATIENT
Start: 2023-08-15 | End: 2023-08-19

## 2023-08-15 RX ORDER — DILTIAZEM HCL 120 MG
30 CAPSULE, EXT RELEASE 24 HR ORAL EVERY 6 HOURS
Refills: 0 | Status: DISCONTINUED | OUTPATIENT
Start: 2023-08-15 | End: 2023-08-17

## 2023-08-15 RX ORDER — THIAMINE MONONITRATE (VIT B1) 100 MG
100 TABLET ORAL DAILY
Refills: 0 | Status: COMPLETED | OUTPATIENT
Start: 2023-08-15 | End: 2023-08-18

## 2023-08-15 RX ORDER — WARFARIN SODIUM 2.5 MG/1
8 TABLET ORAL ONCE
Refills: 0 | Status: DISCONTINUED | OUTPATIENT
Start: 2023-08-15 | End: 2023-08-15

## 2023-08-15 RX ORDER — FUROSEMIDE 40 MG
40 TABLET ORAL ONCE
Refills: 0 | Status: COMPLETED | OUTPATIENT
Start: 2023-08-15 | End: 2023-08-15

## 2023-08-15 RX ORDER — DILTIAZEM HCL 120 MG
10 CAPSULE, EXT RELEASE 24 HR ORAL ONCE
Refills: 0 | Status: DISCONTINUED | OUTPATIENT
Start: 2023-08-15 | End: 2023-08-15

## 2023-08-15 RX ADMIN — Medication 3 MILLIGRAM(S): at 23:02

## 2023-08-15 RX ADMIN — Medication 30 MILLIGRAM(S): at 23:02

## 2023-08-15 RX ADMIN — IRON SUCROSE 100 MILLIGRAM(S): 20 INJECTION, SOLUTION INTRAVENOUS at 21:59

## 2023-08-15 RX ADMIN — WARFARIN SODIUM 8 MILLIGRAM(S): 2.5 TABLET ORAL at 21:58

## 2023-08-15 RX ADMIN — Medication 30 MILLIGRAM(S): at 18:45

## 2023-08-15 RX ADMIN — AMIODARONE HYDROCHLORIDE 16.7 MG/MIN: 400 TABLET ORAL at 12:42

## 2023-08-15 RX ADMIN — Medication 10 MILLIGRAM(S): at 10:03

## 2023-08-15 RX ADMIN — Medication 1 MILLIGRAM(S): at 21:58

## 2023-08-15 RX ADMIN — Medication 40 MILLIGRAM(S): at 11:21

## 2023-08-15 RX ADMIN — Medication 100 MILLIGRAM(S): at 21:58

## 2023-08-15 RX ADMIN — Medication 10 MILLIGRAM(S): at 20:59

## 2023-08-15 NOTE — CARE COORDINATION ASSESSMENT. - NSPASTMEDSURGHISTORY_GEN_ALL_CORE_FT
PAST MEDICAL & SURGICAL HISTORY:  DVT (deep venous thrombosis)  LLE, 11/2019, on Xarelto, provoked after 13 hour car ride      Pulmonary embolism  b/l, 11/2019, on Xarelto      H/O gastric sleeve      S/P gastric bypass

## 2023-08-15 NOTE — PROGRESS NOTE ADULT - SUBJECTIVE AND OBJECTIVE BOX
Patient is a 62y old  Male who presents with a chief complaint of Aflutter w/ RVR (15 Aug 2023 08:58).     Subjective:  INTERVAL HPI/OVERNIGHT EVENTS: Patient seen and examined at bedside. No overnight events occurred. Patient has no complaints at this time. Denies fevers, chills, diaphoresis, headache, lightheadedness, chest pain, palpitations, dyspnea, abdominal pain, nausea, vomiting, diarrhea/constipation.     MEDICATIONS  (STANDING):  aMIOdarone Infusion 0.5 mG/Min (16.7 mL/Hr) IV Continuous <Continuous>  warfarin 8 milliGRAM(s) Oral once    MEDICATIONS  (PRN):  melatonin 3 milliGRAM(s) Oral at bedtime PRN Insomnia      Allergies    No Known Allergies    Intolerances        REVIEW OF SYSTEMS:  CONSTITUTIONAL: No fever or chills  HEENT:  No headache, no sore throat  RESPIRATORY: No cough, wheezing, or shortness of breath  CARDIOVASCULAR: No chest pain, palpitations  GASTROINTESTINAL: No abd pain, nausea, vomiting, or diarrhea  GENITOURINARY: No dysuria, frequency, or hematuria  NEUROLOGICAL: no focal weakness or dizziness  MUSCULOSKELETAL: no myalgias     Objective:  Vital Signs Last 24 Hrs  T(C): 36.5 (15 Aug 2023 04:42), Max: 36.6 (14 Aug 2023 12:22)  T(F): 97.7 (15 Aug 2023 04:42), Max: 97.8 (14 Aug 2023 12:22)  HR: 89 (15 Aug 2023 11:15) (77 - 141)  BP: 124/81 (15 Aug 2023 11:15) (108/74 - 142/91)  BP(mean): --  RR: 18 (15 Aug 2023 11:15) (17 - 20)  SpO2: 95% (15 Aug 2023 11:15) (95% - 100%)    Parameters below as of 15 Aug 2023 11:15  Patient On (Oxygen Delivery Method): room air      GENERAL: NAD, lying in bed comfortably.  HEAD:  Atraumatic, Normocephalic  EYES: EOMI, PERRLA, conjunctiva and sclera clear  ENT: Moist oral mucous   NECK: Supple, No JVD  CHEST/LUNG: Clear to auscultation bilaterally; No rales, rhonchi, or wheezing.  HEART: S1,S2. Regular rate and rhythm. No murmurs  ABDOMEN: Bowel sounds present. Soft, nontender, nondistended. No hepatomegaly  EXTREMITIES:  2+ Peripheral Pulses, brisk capillary refill. No clubbing, cyanosis, or edema  NERVOUS SYSTEM:  Alert & Oriented X3, speech clear. No acute deficit. Non focal   SKIN: Warm.     LABS:                        9.5    4.22  )-----------( 218      ( 15 Aug 2023 06:03 )             30.3     CBC Full  -  ( 15 Aug 2023 06:03 )  WBC Count : 4.22 K/uL  Hemoglobin : 9.5 g/dL  Hematocrit : 30.3 %  Platelet Count - Automated : 218 K/uL  Mean Cell Volume : 78.9 fl  Mean Cell Hemoglobin : 24.7 pg  Mean Cell Hemoglobin Concentration : 31.4 gm/dL  Auto Neutrophil # : 2.26 K/uL  Auto Lymphocyte # : 1.41 K/uL  Auto Monocyte # : 0.41 K/uL  Auto Eosinophil # : 0.11 K/uL  Auto Basophil # : 0.02 K/uL  Auto Neutrophil % : 53.6 %  Auto Lymphocyte % : 33.4 %  Auto Monocyte % : 9.7 %  Auto Eosinophil % : 2.6 %  Auto Basophil % : 0.5 %    15 Aug 2023 06:03    144    |  109    |  16     ----------------------------<  92     3.6     |  29     |  0.74     Ca    8.2        15 Aug 2023 06:03    TPro  5.8    /  Alb  2.9    /  TBili  0.6    /  DBili  x      /  AST  13     /  ALT  17     /  AlkPhos  78     15 Aug 2023 06:03    PT/INR - ( 15 Aug 2023 06:03 )   PT: 21.6 sec;   INR: 1.88 ratio         PTT - ( 15 Aug 2023 06:03 )  PTT:31.4 sec  Urinalysis Basic - ( 15 Aug 2023 06:03 )    Color: x / Appearance: x / SG: x / pH: x  Gluc: 92 mg/dL / Ketone: x  / Bili: x / Urobili: x   Blood: x / Protein: x / Nitrite: x   Leuk Esterase: x / RBC: x / WBC x   Sq Epi: x / Non Sq Epi: x / Bacteria: x      CAPILLARY BLOOD GLUCOSE        RADIOLOGY & ADDITIONAL TESTS:  < from: Xray Chest 1 View AP/PA (08.14.23 @ 16:32) >  ACC: [id number] EXAM:  XR CHEST AP OR PA 1V   ORDERED BY: EVERARDO BOYLE     PROCEDURE DATE:  08/14/2023    IMPRESSION: No acute finding or change.    < from: Xray Chest 1 View- PORTABLE-Urgent (08.14.23 @ 13:41) >  ACC: [id number] EXAM:  XR CHEST PORTABLE URGENT 1V   ORDERED BY: ARIANA WILLINGHAM     PROCEDURE DATE:  08/14/2023    IMPRESSION: Borderline cardiomegaly and slight prominence to perihilar   interstitial markings less obvious than on previous exam. Positive   findings probably related to the portable technique. Clinical correlation   suggested. Regional osseous structures appropriate for age.      < from: VA Duplex Lower Ext Vein Scan, Right (12.08.19 @ 12:20) >  EXAM:  DUPLEX EXT VEINS LOWER RT                          PROCEDURE DATE:  12/08/2019    IMPRESSION:     No evidence of right lower extremity deep venous thrombosis.    < from: CT Abdomen and Pelvis No Cont (12.07.19 @ 13:24) >  EXAM:  CT ABDOMEN AND PELVIS                            PROCEDURE DATE:  12/07/2019    IMPRESSION:     No evidence of malignancy at noncontrast imaging, as clinically   questioned.    Status post gastric bypass and gastric lap band. The lap band is at the   level of the gastrojejunal anastomosis.    < from: VA Duplex Lower Ext Vein Scan, Left (12.05.19 @ 15:53) >  EXAM:  DUPLEX EXT VEINS LOWER LT                            PROCEDURE DATE:  12/05/2019      < from: VA Duplex Lower Ext Vein Scan, Left (12.05.19 @ 15:53) >  IMPRESSION:     There is acute above-the-knee left-sided deep venous thrombosis involving   the distal segment of the left femoral vein, left popliteal vein, left   tibioperoneal trunk and left gastrocnemius vein.    Superficial thrombophlebitis involving the left greater saphenous vein as   described above.      < from: Xray Chest 2 Views PA/Lat (12.05.19 @ 12:59) >  EXAM:  XR CHEST PA LAT 2V                          PROCEDURE DATE:  12/05/2019    IMPRESSION:  Clear lungs.       Personally reviewed.     Consultant(s) Notes Reviewed:  [x] YES  [ ] NO     Patient is a 62y old  Male who presents with a chief complaint of Aflutter w/ RVR (15 Aug 2023 08:58).     Subjective:  INTERVAL HPI/OVERNIGHT EVENTS: Patient seen and examined at bedside. Tele afib 100-120 and a-flutter 120s ovn. No overnight events occurred. Patient has no complaints at this time. Denies fevers, chills, diaphoresis, headache, lightheadedness, chest pain, palpitations, dyspnea, abdominal pain, nausea, vomiting, diarrhea/constipation.     MEDICATIONS  (STANDING):  aMIOdarone Infusion 0.5 mG/Min (16.7 mL/Hr) IV Continuous <Continuous>  warfarin 8 milliGRAM(s) Oral once    MEDICATIONS  (PRN):  melatonin 3 milliGRAM(s) Oral at bedtime PRN Insomnia      Allergies    No Known Allergies    Intolerances        REVIEW OF SYSTEMS:  CONSTITUTIONAL: No fever or chills  HEENT:  No headache, no sore throat  RESPIRATORY: No cough, wheezing, or shortness of breath  CARDIOVASCULAR: No chest pain, palpitations  GASTROINTESTINAL: No abd pain, nausea, vomiting, or diarrhea  GENITOURINARY: No dysuria, frequency, or hematuria  NEUROLOGICAL: no focal weakness or dizziness  MUSCULOSKELETAL: no myalgias     Objective:  Vital Signs Last 24 Hrs  T(C): 36.5 (15 Aug 2023 04:42), Max: 36.6 (14 Aug 2023 12:22)  T(F): 97.7 (15 Aug 2023 04:42), Max: 97.8 (14 Aug 2023 12:22)  HR: 89 (15 Aug 2023 11:15) (77 - 141)  BP: 124/81 (15 Aug 2023 11:15) (108/74 - 142/91)  BP(mean): --  RR: 18 (15 Aug 2023 11:15) (17 - 20)  SpO2: 95% (15 Aug 2023 11:15) (95% - 100%)    Parameters below as of 15 Aug 2023 11:15  Patient On (Oxygen Delivery Method): room air      GENERAL: NAD, lying in bed comfortably.  HEAD:  Atraumatic, Normocephalic  EYES: EOMI, PERRLA, conjunctiva and sclera clear  ENT: Moist oral mucous   NECK: Supple, No JVD  CHEST/LUNG: Clear to auscultation bilaterally; No rales, rhonchi, or wheezing.  HEART: S1,S2. Regular rate and rhythm. No murmurs  ABDOMEN: Bowel sounds present. Soft, nontender, nondistended. No hepatomegaly  EXTREMITIES:  2+ Peripheral Pulses, brisk capillary refill. No clubbing, cyanosis, or edema  NERVOUS SYSTEM:  Alert & Oriented X3, speech clear. No acute deficit. Non focal   SKIN: Warm.     LABS:                        9.5    4.22  )-----------( 218      ( 15 Aug 2023 06:03 )             30.3     CBC Full  -  ( 15 Aug 2023 06:03 )  WBC Count : 4.22 K/uL  Hemoglobin : 9.5 g/dL  Hematocrit : 30.3 %  Platelet Count - Automated : 218 K/uL  Mean Cell Volume : 78.9 fl  Mean Cell Hemoglobin : 24.7 pg  Mean Cell Hemoglobin Concentration : 31.4 gm/dL  Auto Neutrophil # : 2.26 K/uL  Auto Lymphocyte # : 1.41 K/uL  Auto Monocyte # : 0.41 K/uL  Auto Eosinophil # : 0.11 K/uL  Auto Basophil # : 0.02 K/uL  Auto Neutrophil % : 53.6 %  Auto Lymphocyte % : 33.4 %  Auto Monocyte % : 9.7 %  Auto Eosinophil % : 2.6 %  Auto Basophil % : 0.5 %    15 Aug 2023 06:03    144    |  109    |  16     ----------------------------<  92     3.6     |  29     |  0.74     Ca    8.2        15 Aug 2023 06:03    TPro  5.8    /  Alb  2.9    /  TBili  0.6    /  DBili  x      /  AST  13     /  ALT  17     /  AlkPhos  78     15 Aug 2023 06:03    PT/INR - ( 15 Aug 2023 06:03 )   PT: 21.6 sec;   INR: 1.88 ratio         PTT - ( 15 Aug 2023 06:03 )  PTT:31.4 sec  Urinalysis Basic - ( 15 Aug 2023 06:03 )    Color: x / Appearance: x / SG: x / pH: x  Gluc: 92 mg/dL / Ketone: x  / Bili: x / Urobili: x   Blood: x / Protein: x / Nitrite: x   Leuk Esterase: x / RBC: x / WBC x   Sq Epi: x / Non Sq Epi: x / Bacteria: x      CAPILLARY BLOOD GLUCOSE        RADIOLOGY & ADDITIONAL TESTS:  < from: Xray Chest 1 View AP/PA (08.14.23 @ 16:32) >  ACC: [id number] EXAM:  XR CHEST AP OR PA 1V   ORDERED BY: EVERARDO BOYLE     PROCEDURE DATE:  08/14/2023    IMPRESSION: No acute finding or change.    < from: Xray Chest 1 View- PORTABLE-Urgent (08.14.23 @ 13:41) >  ACC: [id number] EXAM:  XR CHEST PORTABLE URGENT 1V   ORDERED BY: ARIANA WILLINGHAM     PROCEDURE DATE:  08/14/2023    IMPRESSION: Borderline cardiomegaly and slight prominence to perihilar   interstitial markings less obvious than on previous exam. Positive   findings probably related to the portable technique. Clinical correlation   suggested. Regional osseous structures appropriate for age.      < from: VA Duplex Lower Ext Vein Scan, Right (12.08.19 @ 12:20) >  EXAM:  DUPLEX EXT VEINS LOWER RT                          PROCEDURE DATE:  12/08/2019    IMPRESSION:     No evidence of right lower extremity deep venous thrombosis.    < from: CT Abdomen and Pelvis No Cont (12.07.19 @ 13:24) >  EXAM:  CT ABDOMEN AND PELVIS                            PROCEDURE DATE:  12/07/2019    IMPRESSION:     No evidence of malignancy at noncontrast imaging, as clinically   questioned.    Status post gastric bypass and gastric lap band. The lap band is at the   level of the gastrojejunal anastomosis.    < from: VA Duplex Lower Ext Vein Scan, Left (12.05.19 @ 15:53) >  EXAM:  DUPLEX EXT VEINS LOWER LT                            PROCEDURE DATE:  12/05/2019      < from: VA Duplex Lower Ext Vein Scan, Left (12.05.19 @ 15:53) >  IMPRESSION:     There is acute above-the-knee left-sided deep venous thrombosis involving   the distal segment of the left femoral vein, left popliteal vein, left   tibioperoneal trunk and left gastrocnemius vein.    Superficial thrombophlebitis involving the left greater saphenous vein as   described above.      < from: Xray Chest 2 Views PA/Lat (12.05.19 @ 12:59) >  EXAM:  XR CHEST PA LAT 2V                          PROCEDURE DATE:  12/05/2019    IMPRESSION:  Clear lungs.       Personally reviewed.     Consultant(s) Notes Reviewed:  [x] YES  [ ] NO

## 2023-08-15 NOTE — CARE COORDINATION ASSESSMENT. - LIVING ARRANGEMENTS, PROFILE
7 stairs outside of house and 12 to 14 stairs to bedroom. Patient states he was doing stairs independently pta./house

## 2023-08-15 NOTE — PROGRESS NOTE ADULT - PROBLEM SELECTOR PLAN 3
PMH LLE DVT and b/l PE in 2019, on warfarin 7.5mg daily at night, INR on admission 1.99    - f/u AM INR daily   - Warfarin discontinued PMH LLE DVT and b/l PE in 2019, on warfarin 7.5mg daily at night, INR on admission 1.99    - f/u AM INR daily
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
## 2023-08-15 NOTE — CHART NOTE - NSCHARTNOTEFT_GEN_A_CORE
RN called for patient with new onset L wrist and forearm "numbness." Pt admits to having a lot of alcohol recently on a trip, last drink 2 days ago. States he is not weak and no tingling but it feels "off." Pt seen and examined at bedside. Strength 5/5 b/l, sensation intact throughout, AOx3. Rest of neuro examine is benign. No tremors noticed. Pt told to alert RN if symptoms change or worsen. Pt started on CIWA protocol.     Folate and thiamine wnl. Will start folate and thiamine given alcohol use.     Pt was initially in a flutter 120s-130s all day on amiodarone gtt. on tele check, now sinus tachy 130s sustained. Denies CP, dizziness. Continue amiodarone.

## 2023-08-15 NOTE — CHART NOTE - NSCHARTNOTEFT_GEN_A_CORE
-130s afib   on amidarone   will give another cardizem 10mg IVP and consider starting po cardizem  IV iron ordered. iron deficient likely secondary to history of gastric bypass. -130s afib   on amidarone   will start cardizem 30mg q6h with holding parameters   IV iron ordered. iron deficient likely secondary to history of gastric bypass.

## 2023-08-15 NOTE — CARE COORDINATION ASSESSMENT. - NSCAREPROVIDERS_GEN_ALL_CORE_FT
CARE PROVIDERS:  Accepting Physician: Taco Sainz  Administration: Ashley Naranjo  Administration: Hailey Brannon  Admitting: Taco Sainz  Attending: Taco Sainz  Cardiology Technician: Rubina Jackson  Consultant: Yayo Valdes  Covering Team: Blair Casas ED ACP: Марина Kaur ED Attending: Jessica Mays ED Nurse: Nicola Allan  Infection Control: Vangie Longoria  Nurse: Nidia Ha  Nurse: Carol Reagan  Nurse: Joselin Hopkins  Ordered: Doctor, Unknown  Ordered: ADM, User  Override: Carol Reagan  Override: Brooke Betts  Override: Lizandro Lester  PCA/Nursing Assistant: Marycruz Lopez  Primary Team: Jose Moon  Primary Team: Kiera Phillips  Primary Team: Cliff Murphy  Primary Team: Taco Sainz  Primary Team: Sushil Rodriguez  Registered Dietitian: Esthela Thorne  Respiratory Therapy: Raven Goodman  Respiratory Therapy: Austin Hernandez  : Whit Xiao  Student: Komal Duvall

## 2023-08-15 NOTE — PROGRESS NOTE ADULT - PROBLEM SELECTOR PLAN 1
Pt with palpitations x3 days, 1x episode of lightheadedness, presenting from office due to EKG with a-flutter . On admission EKG showing afib/flutter with SVT . s/p cardizem 10mg IVP, amiodarone 150mg IVPB    - continue on amiodarone drip as ordered  -patient is already on warfarin due to hx of PE/DVT, INR today is 1.99. Give Warfarin 7.5mg tonight  - f/u cardio recs (Dr Valdes)  - TSH 12  - TTE discontinued Pt with palpitations x3 days, 1x episode of lightheadedness, presenting from office due to EKG with a-flutter . On admission EKG showing afib/flutter with SVT . s/p cardizem 10mg IVP, amiodarone 150mg IVPB    - continue on amiodarone drip as ordered, was inadvertently cancelled overnight  - patient is already on warfarin due to hx of PE/DVT, INR today is 1.88. Give Warfarin 8mg tonight  - f/u cardio recs (Dr Valdes)  - TSH 2.78

## 2023-08-15 NOTE — PROGRESS NOTE ADULT - PROBLEM SELECTOR PLAN 4
DVT ppx: continue warfarin 7.5mg daily as per daily INR    Abnormal CXR: Borderline cardiomegaly and slight prominence to perihilar   interstitial markings less obvious than on previous exam. Positive   findings probably related to the portable technique  -check CXR in AM DVT ppx: continue warfarin 7.5mg daily as per daily INR    Abnormal CXR: Borderline cardiomegaly and slight prominence to perihilar interstitial markings less obvious than on previous exam. Positive findings probably related to the portable technique  - f/u CXR 8/15: IMPRESSION: No acute finding or change
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
## 2023-08-15 NOTE — PROGRESS NOTE ADULT - ASSESSMENT
pt with new onset of atrial flutter  s/p dvt 2019 and pe- on coumadin  s/p gastric bypassand- gastric sleeve  pt is started on amiodarone -if pt does not convert to rsr- pt will need dc cardoversion-discussed with pt and family  pt remains in atrial flutter- needs dc version - scheduled tomorrow 8/16   chf on xray chest-- lasix ordered  anemia- basic work up

## 2023-08-15 NOTE — PROGRESS NOTE ADULT - ASSESSMENT
61yo M with PMH DVT of LLE and b/l PE (2019) on lifetime warfarin, s/p gastric bypass and gastric sleeve presents to the ED on 8/14/23 due to abnormal EKG in outpt office, admitted to medicine for management of afib RVR.  61yo M with PMH DVT of LLE and b/l PE (2019) on lifetime warfarin, s/p gastric bypass and gastric sleeve presents to the ED on 8/14/23 due to abnormal EKG in outpt office, admitted to medicine for management of a-flutter RVR.

## 2023-08-16 LAB
ALBUMIN SERPL ELPH-MCNC: 3 G/DL — LOW (ref 3.3–5)
ALP SERPL-CCNC: 81 U/L — SIGNIFICANT CHANGE UP (ref 40–120)
ALT FLD-CCNC: 17 U/L — SIGNIFICANT CHANGE UP (ref 12–78)
ANION GAP SERPL CALC-SCNC: 6 MMOL/L — SIGNIFICANT CHANGE UP (ref 5–17)
APTT BLD: 33.1 SEC — SIGNIFICANT CHANGE UP (ref 24.5–35.6)
AST SERPL-CCNC: 13 U/L — LOW (ref 15–37)
BILIRUB SERPL-MCNC: 0.4 MG/DL — SIGNIFICANT CHANGE UP (ref 0.2–1.2)
BUN SERPL-MCNC: 17 MG/DL — SIGNIFICANT CHANGE UP (ref 7–23)
CALCIUM SERPL-MCNC: 8.2 MG/DL — LOW (ref 8.5–10.1)
CHLORIDE SERPL-SCNC: 106 MMOL/L — SIGNIFICANT CHANGE UP (ref 96–108)
CO2 SERPL-SCNC: 30 MMOL/L — SIGNIFICANT CHANGE UP (ref 22–31)
CREAT SERPL-MCNC: 0.85 MG/DL — SIGNIFICANT CHANGE UP (ref 0.5–1.3)
EGFR: 99 ML/MIN/1.73M2 — SIGNIFICANT CHANGE UP
GLUCOSE SERPL-MCNC: 93 MG/DL — SIGNIFICANT CHANGE UP (ref 70–99)
HCT VFR BLD CALC: 32.3 % — LOW (ref 39–50)
HGB BLD-MCNC: 9.9 G/DL — LOW (ref 13–17)
INR BLD: 1.84 RATIO — HIGH (ref 0.85–1.18)
MAGNESIUM SERPL-MCNC: 2 MG/DL — SIGNIFICANT CHANGE UP (ref 1.6–2.6)
MCHC RBC-ENTMCNC: 24.3 PG — LOW (ref 27–34)
MCHC RBC-ENTMCNC: 30.7 GM/DL — LOW (ref 32–36)
MCV RBC AUTO: 79.4 FL — LOW (ref 80–100)
NRBC # BLD: 0 /100 WBCS — SIGNIFICANT CHANGE UP (ref 0–0)
PHOSPHATE SERPL-MCNC: 3.3 MG/DL — SIGNIFICANT CHANGE UP (ref 2.5–4.5)
PLATELET # BLD AUTO: 226 K/UL — SIGNIFICANT CHANGE UP (ref 150–400)
POTASSIUM SERPL-MCNC: 3.4 MMOL/L — LOW (ref 3.5–5.3)
POTASSIUM SERPL-SCNC: 3.4 MMOL/L — LOW (ref 3.5–5.3)
PROT SERPL-MCNC: 6.1 G/DL — SIGNIFICANT CHANGE UP (ref 6–8.3)
PROTHROM AB SERPL-ACNC: 21.2 SEC — HIGH (ref 9.5–13)
RBC # BLD: 4.07 M/UL — LOW (ref 4.2–5.8)
RBC # FLD: 18.6 % — HIGH (ref 10.3–14.5)
SODIUM SERPL-SCNC: 142 MMOL/L — SIGNIFICANT CHANGE UP (ref 135–145)
WBC # BLD: 6.05 K/UL — SIGNIFICANT CHANGE UP (ref 3.8–10.5)
WBC # FLD AUTO: 6.05 K/UL — SIGNIFICANT CHANGE UP (ref 3.8–10.5)

## 2023-08-16 PROCEDURE — 99233 SBSQ HOSP IP/OBS HIGH 50: CPT

## 2023-08-16 PROCEDURE — 93010 ELECTROCARDIOGRAM REPORT: CPT

## 2023-08-16 PROCEDURE — 93306 TTE W/DOPPLER COMPLETE: CPT | Mod: 26

## 2023-08-16 PROCEDURE — 99233 SBSQ HOSP IP/OBS HIGH 50: CPT | Mod: GC

## 2023-08-16 RX ORDER — POTASSIUM CHLORIDE 20 MEQ
40 PACKET (EA) ORAL EVERY 4 HOURS
Refills: 0 | Status: COMPLETED | OUTPATIENT
Start: 2023-08-16 | End: 2023-08-16

## 2023-08-16 RX ORDER — POTASSIUM CHLORIDE 20 MEQ
20 PACKET (EA) ORAL ONCE
Refills: 0 | Status: DISCONTINUED | OUTPATIENT
Start: 2023-08-16 | End: 2023-08-16

## 2023-08-16 RX ORDER — WARFARIN SODIUM 2.5 MG/1
8 TABLET ORAL ONCE
Refills: 0 | Status: COMPLETED | OUTPATIENT
Start: 2023-08-16 | End: 2023-08-16

## 2023-08-16 RX ORDER — ACETAMINOPHEN 500 MG
650 TABLET ORAL EVERY 6 HOURS
Refills: 0 | Status: DISCONTINUED | OUTPATIENT
Start: 2023-08-16 | End: 2023-08-17

## 2023-08-16 RX ADMIN — Medication 650 MILLIGRAM(S): at 01:40

## 2023-08-16 RX ADMIN — AMIODARONE HYDROCHLORIDE 16.7 MG/MIN: 400 TABLET ORAL at 18:51

## 2023-08-16 RX ADMIN — WARFARIN SODIUM 8 MILLIGRAM(S): 2.5 TABLET ORAL at 21:25

## 2023-08-16 RX ADMIN — Medication 100 MILLIGRAM(S): at 17:15

## 2023-08-16 RX ADMIN — Medication 30 MILLIGRAM(S): at 17:15

## 2023-08-16 RX ADMIN — Medication 30 MILLIGRAM(S): at 23:27

## 2023-08-16 RX ADMIN — Medication 1 MILLIGRAM(S): at 17:15

## 2023-08-16 RX ADMIN — Medication 650 MILLIGRAM(S): at 23:26

## 2023-08-16 RX ADMIN — Medication 650 MILLIGRAM(S): at 00:30

## 2023-08-16 RX ADMIN — Medication 30 MILLIGRAM(S): at 06:01

## 2023-08-16 RX ADMIN — IRON SUCROSE 100 MILLIGRAM(S): 20 INJECTION, SOLUTION INTRAVENOUS at 21:25

## 2023-08-16 RX ADMIN — Medication 650 MILLIGRAM(S): at 18:06

## 2023-08-16 RX ADMIN — Medication 650 MILLIGRAM(S): at 17:15

## 2023-08-16 RX ADMIN — Medication 3 MILLIGRAM(S): at 22:16

## 2023-08-16 RX ADMIN — Medication 30 MILLIGRAM(S): at 12:03

## 2023-08-16 RX ADMIN — Medication 40 MILLIEQUIVALENT(S): at 10:40

## 2023-08-16 RX ADMIN — Medication 40 MILLIEQUIVALENT(S): at 17:18

## 2023-08-16 RX ADMIN — AMIODARONE HYDROCHLORIDE 16.7 MG/MIN: 400 TABLET ORAL at 04:03
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
## 2023-08-16 NOTE — PROGRESS NOTE ADULT - PROBLEM SELECTOR PLAN 1
Pt with palpitations x3 days, 1x episode of lightheadedness, presenting from office due to EKG with a-flutter . On admission EKG showing afib/flutter with SVT . s/p cardizem 10mg IVP, amiodarone 150mg IVPB    - continue on amiodarone drip as ordered  - patient is already on warfarin due to hx of PE/DVT, INR today is 1.84. Give Warfarin 8mg, oral, once, stop after 1 doses   - f/u cardio recs (Dr Valdes)  - TSH 2.78 Pt with palpitations x3 days, 1x episode of lightheadedness, presenting from office due to EKG with a-flutter . On admission EKG showing afib/flutter with SVT .    - continue on amiodarone drip as ordered  - continue on cardizem 30mg PO q6hr  - patient is already on warfarin due to hx of PE/DVT, INR today is 1.84. Give Warfarin 8mg, oral, once, stop after 1 doses   - f/u cardio recs (Dr Valdes)  - TSH 2.78  - cardioversion rescheduled for friday 8/18.  pt's liraglutide needed to be stopped 7 days prior to elective procedure for anesthesia or conscious sedation

## 2023-08-16 NOTE — PROGRESS NOTE ADULT - ASSESSMENT
61yo M with PMH DVT of LLE and b/l PE (2019) on lifetime warfarin, s/p gastric bypass and gastric sleeve presents to the ED on 8/14/23 due to abnormal EKG in outpt office, admitted to medicine for management of a-flutter RVR.

## 2023-08-16 NOTE — PROGRESS NOTE ADULT - ASSESSMENT
pt with new onset of atrial flutter  s/p dvt 2019 and pe- on coumadin  s/p gastric bypassand- gastric sleeve  pt is started on amiodarone -if pt does not convert to rsr- pt will need dc cardioversion-discussed with pt and family  pt remains in atrial flutter- needs dc version - scheduled tomorrow 8/16   chf on xray chest-- lasix ordered  anemia- basic work up   iron deficiency anemia-on iron  remains in atrial flutter-needs dc cardiversion 8/16  risks /benefits including cva explained to pt - pt understood and will give consent for dc cardioversion 8/16

## 2023-08-16 NOTE — PROGRESS NOTE ADULT - PROBLEM SELECTOR PLAN 4
DVT ppx: continue warfarin 8mg daily as per daily INR    Abnormal CXR: Borderline cardiomegaly and slight prominence to perihilar interstitial markings less obvious than on previous exam. Positive findings probably related to the portable technique  - f/u CXR 8/15: IMPRESSION: No acute finding or change

## 2023-08-16 NOTE — PROGRESS NOTE ADULT - PROBLEM SELECTOR PLAN 3
PMH LLE DVT and b/l PE in 2019, on warfarin 7.5mg daily at night, INR on admission 1.99    - f/u AM INR daily PMH LLE DVT and b/l PE in 2019, on warfarin 7.5mg daily at night, INR on admission 1.99 -> 1.88 -> 1.84    - f/u AM INR daily
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
## 2023-08-16 NOTE — PROGRESS NOTE ADULT - SUBJECTIVE AND OBJECTIVE BOX
Patient is a 62y old  Male who presents with a chief complaint of Aflutter w/ RVR (16 Aug 2023 09:44)      Subjective:  INTERVAL HPI/OVERNIGHT EVENTS: Patient seen and examined at bedside. No overnight events occurred. Patient has no complaints at this time. Denies fevers, chills, headache, lightheadedness, chest pain, palpitations, dyspnea, abdominal pain, nausea, vomiting, diarrhea/constipation. Patient had numbness in his left hand overnight.     MEDICATIONS  (STANDING):  aMIOdarone Infusion 0.5 mG/Min (16.7 mL/Hr) IV Continuous <Continuous>  diltiazem    Tablet 30 milliGRAM(s) Oral every 6 hours  folic acid 1 milliGRAM(s) Oral daily  iron sucrose Injectable 100 milliGRAM(s) IV Push every 24 hours  potassium chloride    Tablet ER 40 milliEquivalent(s) Oral every 4 hours  thiamine 100 milliGRAM(s) Oral daily  warfarin 8 milliGRAM(s) Oral once    MEDICATIONS  (PRN):  acetaminophen     Tablet .. 650 milliGRAM(s) Oral every 6 hours PRN Mild Pain (1 - 3)  LORazepam   Injectable 2 milliGRAM(s) IV Push every 2 hours PRN CIWA-Ar score increase by 2 points and a total score of 7 or less  LORazepam   Injectable 2 milliGRAM(s) IV Push every 1 hour PRN CIWA-Ar score 8 or greater  melatonin 3 milliGRAM(s) Oral at bedtime PRN Insomnia      Allergies    No Known Allergies    Intolerances        REVIEW OF SYSTEMS:  CONSTITUTIONAL: No fever or chills  HEENT:  No headache, no sore throat  RESPIRATORY: No cough, wheezing, or shortness of breath  CARDIOVASCULAR: No chest pain, palpitations  GASTROINTESTINAL: No abd pain, nausea, vomiting, or diarrhea  GENITOURINARY: No dysuria, frequency, or hematuria  NEUROLOGICAL: no focal weakness or dizziness. Patient had numbness in his left hand overnight.   MUSCULOSKELETAL: no myalgias     Objective:  Vital Signs Last 24 Hrs  T(C): 36.7 (16 Aug 2023 11:38), Max: 36.7 (16 Aug 2023 11:23)  T(F): 98.1 (16 Aug 2023 11:38), Max: 98.1 (16 Aug 2023 11:23)  HR: 127 (16 Aug 2023 11:38) (114 - 132)  BP: 105/73 (16 Aug 2023 11:38) (105/73 - 127/85)  BP(mean): --  RR: 18 (16 Aug 2023 11:38) (18 - 18)  SpO2: 94% (16 Aug 2023 11:38) (93% - 97%)    Parameters below as of 16 Aug 2023 11:38  Patient On (Oxygen Delivery Method): room air        GENERAL: NAD, lying in bed comfortably.  HEAD:  Atraumatic, Normocephalic  EYES: EOMI, PERRLA, conjunctiva and sclera clear  ENT: Moist oral mucous   NECK: Supple, No JVD  CHEST/LUNG: Clear to auscultation bilaterally; No rales, rhonchi, or wheezing.  HEART: S1,S2. Regular rate and rhythm. No murmurs  ABDOMEN: Bowel sounds present. Soft, nontender, nondistended. No hepatomegaly  EXTREMITIES:  2+ Peripheral Pulses, brisk capillary refill. No clubbing, cyanosis, or edema  NERVOUS SYSTEM:  Alert & Oriented X3, speech clear. No acute deficit. Non focal   SKIN: Warm.     LABS:                        9.9    6.05  )-----------( 226      ( 16 Aug 2023 06:45 )             32.3     CBC Full  -  ( 16 Aug 2023 06:45 )  WBC Count : 6.05 K/uL  Hemoglobin : 9.9 g/dL  Hematocrit : 32.3 %  Platelet Count - Automated : 226 K/uL  Mean Cell Volume : 79.4 fl  Mean Cell Hemoglobin : 24.3 pg  Mean Cell Hemoglobin Concentration : 30.7 gm/dL  Auto Neutrophil # : x  Auto Lymphocyte # : x  Auto Monocyte # : x  Auto Eosinophil # : x  Auto Basophil # : x  Auto Neutrophil % : x  Auto Lymphocyte % : x  Auto Monocyte % : x  Auto Eosinophil % : x  Auto Basophil % : x    16 Aug 2023 06:45    142    |  106    |  17     ----------------------------<  93     3.4     |  30     |  0.85     Ca    8.2        16 Aug 2023 06:45  Phos  3.3       16 Aug 2023 06:56  Mg     2.0       16 Aug 2023 06:56    TPro  6.1    /  Alb  3.0    /  TBili  0.4    /  DBili  x      /  AST  13     /  ALT  17     /  AlkPhos  81     16 Aug 2023 06:45    PT/INR - ( 16 Aug 2023 06:45 )   PT: 21.2 sec;   INR: 1.84 ratio         PTT - ( 16 Aug 2023 06:45 )  PTT:33.1 sec  Urinalysis Basic - ( 16 Aug 2023 06:45 )    Color: x / Appearance: x / SG: x / pH: x  Gluc: 93 mg/dL / Ketone: x  / Bili: x / Urobili: x   Blood: x / Protein: x / Nitrite: x   Leuk Esterase: x / RBC: x / WBC x   Sq Epi: x / Non Sq Epi: x / Bacteria: x      CAPILLARY BLOOD GLUCOSE      RADIOLOGY & ADDITIONAL TESTS:      Personally reviewed.     Consultant(s) Notes Reviewed:  [x] YES  [ ] NO     Patient is a 62y old  Male who presents with a chief complaint of Aflutter w/ RVR (16 Aug 2023 09:44)      Subjective:  INTERVAL HPI/OVERNIGHT EVENTS: Patient seen and examined at bedside. Ovn new onset L wrist and forearm "numbness." Pt admits to having a lot of alcohol recently on a trip, last drink 2 days ago. Assessed by night team, started on CIWA protocol (ativan PRN, no librium taper). Patient has no complaints at this time. Denies fevers, chills, headache, lightheadedness, chest pain, palpitations, dyspnea, abdominal pain, nausea, vomiting, diarrhea/constipation.     MEDICATIONS  (STANDING):  aMIOdarone Infusion 0.5 mG/Min (16.7 mL/Hr) IV Continuous <Continuous>  diltiazem    Tablet 30 milliGRAM(s) Oral every 6 hours  folic acid 1 milliGRAM(s) Oral daily  iron sucrose Injectable 100 milliGRAM(s) IV Push every 24 hours  potassium chloride    Tablet ER 40 milliEquivalent(s) Oral every 4 hours  thiamine 100 milliGRAM(s) Oral daily  warfarin 8 milliGRAM(s) Oral once    MEDICATIONS  (PRN):  acetaminophen     Tablet .. 650 milliGRAM(s) Oral every 6 hours PRN Mild Pain (1 - 3)  LORazepam   Injectable 2 milliGRAM(s) IV Push every 2 hours PRN CIWA-Ar score increase by 2 points and a total score of 7 or less  LORazepam   Injectable 2 milliGRAM(s) IV Push every 1 hour PRN CIWA-Ar score 8 or greater  melatonin 3 milliGRAM(s) Oral at bedtime PRN Insomnia      Allergies    No Known Allergies    Intolerances        REVIEW OF SYSTEMS:  CONSTITUTIONAL: No fever or chills  HEENT:  No headache, no sore throat  RESPIRATORY: No cough, wheezing, or shortness of breath  CARDIOVASCULAR: No chest pain, palpitations  GASTROINTESTINAL: No abd pain, nausea, vomiting, or diarrhea  GENITOURINARY: No dysuria, frequency, or hematuria  NEUROLOGICAL: no focal weakness or dizziness. Patient had numbness in his left hand overnight.   MUSCULOSKELETAL: no myalgias     Objective:  Vital Signs Last 24 Hrs  T(C): 36.7 (16 Aug 2023 11:38), Max: 36.7 (16 Aug 2023 11:23)  T(F): 98.1 (16 Aug 2023 11:38), Max: 98.1 (16 Aug 2023 11:23)  HR: 127 (16 Aug 2023 11:38) (114 - 132)  BP: 105/73 (16 Aug 2023 11:38) (105/73 - 127/85)  BP(mean): --  RR: 18 (16 Aug 2023 11:38) (18 - 18)  SpO2: 94% (16 Aug 2023 11:38) (93% - 97%)    Parameters below as of 16 Aug 2023 11:38  Patient On (Oxygen Delivery Method): room air        GENERAL: NAD, lying in bed comfortably.  HEAD:  Atraumatic, Normocephalic  EYES: EOMI, PERRLA, conjunctiva and sclera clear  ENT: Moist oral mucous   NECK: Supple, No JVD  CHEST/LUNG: Clear to auscultation bilaterally; No rales, rhonchi, or wheezing.  HEART: S1,S2. Regular rate and rhythm. No murmurs  ABDOMEN: Bowel sounds present. Soft, nontender, nondistended. No hepatomegaly  EXTREMITIES:  2+ Peripheral Pulses, brisk capillary refill. No clubbing, cyanosis, or edema  NERVOUS SYSTEM:  Alert & Oriented X3, speech clear. No acute deficit. Non focal   SKIN: Warm.     LABS:                        9.9    6.05  )-----------( 226      ( 16 Aug 2023 06:45 )             32.3     CBC Full  -  ( 16 Aug 2023 06:45 )  WBC Count : 6.05 K/uL  Hemoglobin : 9.9 g/dL  Hematocrit : 32.3 %  Platelet Count - Automated : 226 K/uL  Mean Cell Volume : 79.4 fl  Mean Cell Hemoglobin : 24.3 pg  Mean Cell Hemoglobin Concentration : 30.7 gm/dL  Auto Neutrophil # : x  Auto Lymphocyte # : x  Auto Monocyte # : x  Auto Eosinophil # : x  Auto Basophil # : x  Auto Neutrophil % : x  Auto Lymphocyte % : x  Auto Monocyte % : x  Auto Eosinophil % : x  Auto Basophil % : x    16 Aug 2023 06:45    142    |  106    |  17     ----------------------------<  93     3.4     |  30     |  0.85     Ca    8.2        16 Aug 2023 06:45  Phos  3.3       16 Aug 2023 06:56  Mg     2.0       16 Aug 2023 06:56    TPro  6.1    /  Alb  3.0    /  TBili  0.4    /  DBili  x      /  AST  13     /  ALT  17     /  AlkPhos  81     16 Aug 2023 06:45    PT/INR - ( 16 Aug 2023 06:45 )   PT: 21.2 sec;   INR: 1.84 ratio         PTT - ( 16 Aug 2023 06:45 )  PTT:33.1 sec  Urinalysis Basic - ( 16 Aug 2023 06:45 )    Color: x / Appearance: x / SG: x / pH: x  Gluc: 93 mg/dL / Ketone: x  / Bili: x / Urobili: x   Blood: x / Protein: x / Nitrite: x   Leuk Esterase: x / RBC: x / WBC x   Sq Epi: x / Non Sq Epi: x / Bacteria: x      CAPILLARY BLOOD GLUCOSE      RADIOLOGY & ADDITIONAL TESTS:      Personally reviewed.     Consultant(s) Notes Reviewed:  [x] YES  [ ] NO

## 2023-08-17 LAB
ALBUMIN SERPL ELPH-MCNC: 3.4 G/DL — SIGNIFICANT CHANGE UP (ref 3.3–5)
ALP SERPL-CCNC: 91 U/L — SIGNIFICANT CHANGE UP (ref 40–120)
ALT FLD-CCNC: 17 U/L — SIGNIFICANT CHANGE UP (ref 12–78)
ANION GAP SERPL CALC-SCNC: 6 MMOL/L — SIGNIFICANT CHANGE UP (ref 5–17)
AST SERPL-CCNC: 12 U/L — LOW (ref 15–37)
BILIRUB SERPL-MCNC: 0.5 MG/DL — SIGNIFICANT CHANGE UP (ref 0.2–1.2)
BUN SERPL-MCNC: 14 MG/DL — SIGNIFICANT CHANGE UP (ref 7–23)
CALCIUM SERPL-MCNC: 8.7 MG/DL — SIGNIFICANT CHANGE UP (ref 8.5–10.1)
CHLORIDE SERPL-SCNC: 107 MMOL/L — SIGNIFICANT CHANGE UP (ref 96–108)
CO2 SERPL-SCNC: 27 MMOL/L — SIGNIFICANT CHANGE UP (ref 22–31)
CREAT SERPL-MCNC: 0.86 MG/DL — SIGNIFICANT CHANGE UP (ref 0.5–1.3)
EGFR: 99 ML/MIN/1.73M2 — SIGNIFICANT CHANGE UP
GLUCOSE SERPL-MCNC: 100 MG/DL — HIGH (ref 70–99)
HCT VFR BLD CALC: 35.6 % — LOW (ref 39–50)
HGB BLD-MCNC: 10.9 G/DL — LOW (ref 13–17)
INR BLD: 2.28 RATIO — HIGH (ref 0.85–1.18)
MAGNESIUM SERPL-MCNC: 2.1 MG/DL — SIGNIFICANT CHANGE UP (ref 1.6–2.6)
MCHC RBC-ENTMCNC: 24.5 PG — LOW (ref 27–34)
MCHC RBC-ENTMCNC: 30.6 GM/DL — LOW (ref 32–36)
MCV RBC AUTO: 80.2 FL — SIGNIFICANT CHANGE UP (ref 80–100)
NRBC # BLD: 0 /100 WBCS — SIGNIFICANT CHANGE UP (ref 0–0)
PHOSPHATE SERPL-MCNC: 2.6 MG/DL — SIGNIFICANT CHANGE UP (ref 2.5–4.5)
PLATELET # BLD AUTO: 261 K/UL — SIGNIFICANT CHANGE UP (ref 150–400)
POTASSIUM SERPL-MCNC: 3.8 MMOL/L — SIGNIFICANT CHANGE UP (ref 3.5–5.3)
POTASSIUM SERPL-SCNC: 3.8 MMOL/L — SIGNIFICANT CHANGE UP (ref 3.5–5.3)
PROT SERPL-MCNC: 7.1 G/DL — SIGNIFICANT CHANGE UP (ref 6–8.3)
PROTHROM AB SERPL-ACNC: 26 SEC — HIGH (ref 9.5–13)
RBC # BLD: 4.44 M/UL — SIGNIFICANT CHANGE UP (ref 4.2–5.8)
RBC # FLD: 19 % — HIGH (ref 10.3–14.5)
SODIUM SERPL-SCNC: 140 MMOL/L — SIGNIFICANT CHANGE UP (ref 135–145)
WBC # BLD: 8.15 K/UL — SIGNIFICANT CHANGE UP (ref 3.8–10.5)
WBC # FLD AUTO: 8.15 K/UL — SIGNIFICANT CHANGE UP (ref 3.8–10.5)

## 2023-08-17 PROCEDURE — 99233 SBSQ HOSP IP/OBS HIGH 50: CPT

## 2023-08-17 PROCEDURE — 99233 SBSQ HOSP IP/OBS HIGH 50: CPT | Mod: GC

## 2023-08-17 PROCEDURE — 93306 TTE W/DOPPLER COMPLETE: CPT | Mod: 26

## 2023-08-17 RX ORDER — DILTIAZEM HCL 120 MG
60 CAPSULE, EXT RELEASE 24 HR ORAL EVERY 6 HOURS
Refills: 0 | Status: DISCONTINUED | OUTPATIENT
Start: 2023-08-17 | End: 2023-08-19

## 2023-08-17 RX ORDER — ACETAMINOPHEN 500 MG
650 TABLET ORAL EVERY 6 HOURS
Refills: 0 | Status: COMPLETED | OUTPATIENT
Start: 2023-08-17 | End: 2023-08-18

## 2023-08-17 RX ORDER — FERROUS SULFATE 325(65) MG
325 TABLET ORAL DAILY
Refills: 0 | Status: DISCONTINUED | OUTPATIENT
Start: 2023-08-17 | End: 2023-08-19

## 2023-08-17 RX ORDER — WARFARIN SODIUM 2.5 MG/1
7.5 TABLET ORAL ONCE
Refills: 0 | Status: COMPLETED | OUTPATIENT
Start: 2023-08-17 | End: 2023-08-17

## 2023-08-17 RX ADMIN — Medication 60 MILLIGRAM(S): at 23:13

## 2023-08-17 RX ADMIN — Medication 60 MILLIGRAM(S): at 12:37

## 2023-08-17 RX ADMIN — WARFARIN SODIUM 7.5 MILLIGRAM(S): 2.5 TABLET ORAL at 21:17

## 2023-08-17 RX ADMIN — Medication 650 MILLIGRAM(S): at 00:26

## 2023-08-17 RX ADMIN — Medication 650 MILLIGRAM(S): at 18:30

## 2023-08-17 RX ADMIN — Medication 650 MILLIGRAM(S): at 14:45

## 2023-08-17 RX ADMIN — Medication 60 MILLIGRAM(S): at 17:31

## 2023-08-17 RX ADMIN — Medication 650 MILLIGRAM(S): at 17:31

## 2023-08-17 RX ADMIN — AMIODARONE HYDROCHLORIDE 16.7 MG/MIN: 400 TABLET ORAL at 23:28

## 2023-08-17 RX ADMIN — Medication 650 MILLIGRAM(S): at 13:43

## 2023-08-17 RX ADMIN — Medication 650 MILLIGRAM(S): at 23:43

## 2023-08-17 RX ADMIN — Medication 1 MILLIGRAM(S): at 12:37

## 2023-08-17 RX ADMIN — Medication 325 MILLIGRAM(S): at 12:37

## 2023-08-17 RX ADMIN — Medication 100 MILLIGRAM(S): at 12:37

## 2023-08-17 RX ADMIN — AMIODARONE HYDROCHLORIDE 16.7 MG/MIN: 400 TABLET ORAL at 10:26

## 2023-08-17 RX ADMIN — Medication 30 MILLIGRAM(S): at 05:15

## 2023-08-17 RX ADMIN — Medication 3 MILLIGRAM(S): at 22:46

## 2023-08-17 RX ADMIN — Medication 650 MILLIGRAM(S): at 23:13

## 2023-08-17 NOTE — PROGRESS NOTE ADULT - PROBLEM SELECTOR PLAN 4
DVT ppx: continue warfarin 7.5mg daily as per daily INR    Abnormal CXR: Borderline cardiomegaly and slight prominence to perihilar interstitial markings less obvious than on previous exam. Positive findings probably related to the portable technique  - f/u CXR 8/15: IMPRESSION: No acute finding or change

## 2023-08-17 NOTE — PROVIDER CONTACT NOTE (OTHER) - BACKGROUND
Pt admitted for Aflutter w/ RVR
admitted with AFlutter post  received in ED amiodarone drip for 6 hrs.
admitting diagnosis: aflutter

## 2023-08-17 NOTE — PROVIDER CONTACT NOTE (OTHER) - ACTION/TREATMENT ORDERED:
to give oral medication
Residents came to see pt at bedside. CIWA order set initiated, thiamine and folic acid supplementation ordered, serum magnesium and phosphorus labs ordered.
No new orders. Cont to monitor pt.

## 2023-08-17 NOTE — PHYSICAL EXAM
[Normal] : normal gait, coordination grossly intact, no focal deficits and deep tendon reflexes were 2+ and symmetric [No Murmur] : no murmur heard [de-identified] : rapid heart beat

## 2023-08-17 NOTE — PHARMACOTHERAPY INTERVENTION NOTE - COMMENTS
61 y/o M on warfarin for history of DVT and now new onset of atrial fibrillation    - INR today 2.28 (increase from 1.84); discussed with team about >0.4, will go back to patient's home dose of 7.5mg (received 8mg last night) since patient presented with an INR of 1.99 on home regimen

## 2023-08-17 NOTE — PROVIDER CONTACT NOTE (OTHER) - ASSESSMENT
Radial pulses strong and equal bilaterally,  strength equal bilaterally, denies tingling bilaterally, no discoloration of bilateral hands/arms noted.  Pt states that he can feel when L arm/hand is touched but that it feels "different" than when R arm is touched.
Pt A&Ox4. Skin warm and dry. . Pt denies any sob or chest pain. BP WNL. Pt asleep @ this time.
denies any chest pain or SOB or palpitation
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
## 2023-08-17 NOTE — HISTORY OF PRESENT ILLNESS
[FreeTextEntry1] : 61 yo male patient presents today to evaluate palpitations. [de-identified] : Patient has his EKG taken where he is discovered to have atrial flutter. Pt states that he has been dealing with palpitations for the past few days at home. He even reports on one occasion suddenly waking up in the middle of the night as a result of it.

## 2023-08-17 NOTE — PROGRESS NOTE ADULT - PROBLEM SELECTOR PLAN 3
PMH LLE DVT and b/l PE in 2019, on warfarin 7.5mg daily at night, INR on admission 1.99 -> 1.88 -> 1.84    - f/u AM INR daily PMH LLE DVT and b/l PE in 2019, on warfarin 7.5mg daily at night, INR on admission 1.99 -> 1.88 -> 1.84    - f/u AM INR daily  - give 7.5mg warfarin 8/17, reassess INR tomorrow

## 2023-08-17 NOTE — PROGRESS NOTE ADULT - SUBJECTIVE AND OBJECTIVE BOX
Patient is a 62y old  Male who presents with a chief complaint of Aflutter w/ RVR (17 Aug 2023 08:41)      Subjective:  INTERVAL HPI/OVERNIGHT EVENTS: Patient seen and examined at bedside. No overnight events occurred. Patient has no complaints at this time. Denies fevers, chills, headache, lightheadedness, chest pain, palpitations, dyspnea, abdominal pain, nausea, vomiting, diarrhea/constipation. Patient had mild skin reaction to Iron infusion.     MEDICATIONS  (STANDING):  aMIOdarone Infusion 0.5 mG/Min (16.7 mL/Hr) IV Continuous <Continuous>  diltiazem    Tablet 60 milliGRAM(s) Oral every 6 hours  ferrous    sulfate 325 milliGRAM(s) Oral daily  folic acid 1 milliGRAM(s) Oral daily  thiamine 100 milliGRAM(s) Oral daily  warfarin 7.5 milliGRAM(s) Oral once    MEDICATIONS  (PRN):  acetaminophen     Tablet .. 650 milliGRAM(s) Oral every 6 hours PRN Mild Pain (1 - 3)  LORazepam   Injectable 2 milliGRAM(s) IV Push every 2 hours PRN CIWA-Ar score increase by 2 points and a total score of 7 or less  LORazepam   Injectable 2 milliGRAM(s) IV Push every 1 hour PRN CIWA-Ar score 8 or greater  melatonin 3 milliGRAM(s) Oral at bedtime PRN Insomnia      Allergies    No Known Allergies    Intolerances        REVIEW OF SYSTEMS:  CONSTITUTIONAL: No fever or chills  HEENT:  No headache, no sore throat  RESPIRATORY: No cough, wheezing, or shortness of breath  CARDIOVASCULAR: No chest pain, palpitations  GASTROINTESTINAL: No abd pain, nausea, vomiting, or diarrhea  GENITOURINARY: No dysuria, frequency, or hematuria  NEUROLOGICAL: no focal weakness or dizziness  MUSCULOSKELETAL: no myalgias     Objective:  Vital Signs Last 24 Hrs  T(C): 37.1 (17 Aug 2023 04:49), Max: 37.1 (17 Aug 2023 04:49)  T(F): 98.7 (17 Aug 2023 04:49), Max: 98.7 (17 Aug 2023 04:49)  HR: 90 (17 Aug 2023 04:49) (90 - 131)  BP: 115/79 (17 Aug 2023 04:49) (110/74 - 132/62)  BP(mean): 99 (16 Aug 2023 12:35) (99 - 99)  RR: 18 (17 Aug 2023 04:49) (18 - 18)  SpO2: 96% (17 Aug 2023 04:49) (93% - 96%)    Parameters below as of 17 Aug 2023 04:49  Patient On (Oxygen Delivery Method): room air        GENERAL: NAD, lying in bed comfortably,.  HEAD:  Atraumatic, Normocephalic  EYES: EOMI, PERRLA, conjunctiva and sclera clear  ENT: Moist oral mucous   NECK: Supple, No JVD  CHEST/LUNG: Clear to auscultation bilaterally; No rales, rhonchi, or wheezing.  HEART: S1,S2. Regular rate and rhythm. No murmurs  ABDOMEN: Bowel sounds present. Soft, nontender, nondistended. No hepatomegaly  EXTREMITIES:  2+ Peripheral Pulses, brisk capillary refill. No clubbing, cyanosis, or edema  NERVOUS SYSTEM:  Alert & Oriented X3, speech clear. No acute deficit. Non focal   SKIN: Warm.     LABS:                        10.9   8.15  )-----------( 261      ( 17 Aug 2023 08:25 )             35.6     CBC Full  -  ( 17 Aug 2023 08:25 )  WBC Count : 8.15 K/uL  Hemoglobin : 10.9 g/dL  Hematocrit : 35.6 %  Platelet Count - Automated : 261 K/uL  Mean Cell Volume : 80.2 fl  Mean Cell Hemoglobin : 24.5 pg  Mean Cell Hemoglobin Concentration : 30.6 gm/dL  Auto Neutrophil # : x  Auto Lymphocyte # : x  Auto Monocyte # : x  Auto Eosinophil # : x  Auto Basophil # : x  Auto Neutrophil % : x  Auto Lymphocyte % : x  Auto Monocyte % : x  Auto Eosinophil % : x  Auto Basophil % : x    17 Aug 2023 08:25    140    |  107    |  14     ----------------------------<  100    3.8     |  27     |  0.86     Ca    8.7        17 Aug 2023 08:25  Phos  2.6       17 Aug 2023 08:25  Mg     2.1       17 Aug 2023 08:25    TPro  7.1    /  Alb  3.4    /  TBili  0.5    /  DBili  x      /  AST  12     /  ALT  17     /  AlkPhos  91     17 Aug 2023 08:25    PT/INR - ( 17 Aug 2023 08:25 )   PT: 26.0 sec;   INR: 2.28 ratio         PTT - ( 16 Aug 2023 06:45 )  PTT:33.1 sec  Urinalysis Basic - ( 17 Aug 2023 08:25 )    Color: x / Appearance: x / SG: x / pH: x  Gluc: 100 mg/dL / Ketone: x  / Bili: x / Urobili: x   Blood: x / Protein: x / Nitrite: x   Leuk Esterase: x / RBC: x / WBC x   Sq Epi: x / Non Sq Epi: x / Bacteria: x      CAPILLARY BLOOD GLUCOSE              RADIOLOGY & ADDITIONAL TESTS:  TRANSTHORACIC ECHOCARDIOGRAM Study Date:    8/16/2023  Referring Physician:    [id number] Taco Sainz  Interpreting Physician: Yayo Desouza  CONCLUSIONS:   1. Technically difficult image quality.   2. Left ventricular systolic function is mildly decreased with an ejection fraction visually estimated at 40 to 45 %. There is global left ventricular hypokinesis.   3. The left atrium is mildly dilated in size.   4. Trace mitral regurgitation.   5. Mild thickening of the aortic valve leaflets.   6. Trace tricuspid regurgitation.      Personally reviewed.     Consultant(s) Notes Reviewed:  [x] YES  [ ] NO           Patient is a 62y old  Male who presents with a chief complaint of Aflutter w/ RVR (17 Aug 2023 08:41)      Subjective:  INTERVAL HPI/OVERNIGHT EVENTS: Patient seen and examined at bedside. No overnight events occurred. Patient concerned for pain related to Iron infusion, requests PO iron.  Denies fevers, chills, headache, lightheadedness, chest pain, palpitations, dyspnea, abdominal pain, nausea, vomiting, diarrhea/constipation.    MEDICATIONS  (STANDING):  aMIOdarone Infusion 0.5 mG/Min (16.7 mL/Hr) IV Continuous <Continuous>  diltiazem    Tablet 60 milliGRAM(s) Oral every 6 hours  ferrous    sulfate 325 milliGRAM(s) Oral daily  folic acid 1 milliGRAM(s) Oral daily  thiamine 100 milliGRAM(s) Oral daily  warfarin 7.5 milliGRAM(s) Oral once    MEDICATIONS  (PRN):  acetaminophen     Tablet .. 650 milliGRAM(s) Oral every 6 hours PRN Mild Pain (1 - 3)  LORazepam   Injectable 2 milliGRAM(s) IV Push every 2 hours PRN CIWA-Ar score increase by 2 points and a total score of 7 or less  LORazepam   Injectable 2 milliGRAM(s) IV Push every 1 hour PRN CIWA-Ar score 8 or greater  melatonin 3 milliGRAM(s) Oral at bedtime PRN Insomnia      Allergies    No Known Allergies    Intolerances        REVIEW OF SYSTEMS:  CONSTITUTIONAL: No fever or chills  HEENT:  No headache, no sore throat  RESPIRATORY: No cough, wheezing, or shortness of breath  CARDIOVASCULAR: No chest pain, palpitations  GASTROINTESTINAL: No abd pain, nausea, vomiting, or diarrhea  GENITOURINARY: No dysuria, frequency, or hematuria  NEUROLOGICAL: no focal weakness or dizziness  MUSCULOSKELETAL: no myalgias     Objective:  Vital Signs Last 24 Hrs  T(C): 37.1 (17 Aug 2023 04:49), Max: 37.1 (17 Aug 2023 04:49)  T(F): 98.7 (17 Aug 2023 04:49), Max: 98.7 (17 Aug 2023 04:49)  HR: 90 (17 Aug 2023 04:49) (90 - 131)  BP: 115/79 (17 Aug 2023 04:49) (110/74 - 132/62)  BP(mean): 99 (16 Aug 2023 12:35) (99 - 99)  RR: 18 (17 Aug 2023 04:49) (18 - 18)  SpO2: 96% (17 Aug 2023 04:49) (93% - 96%)    Parameters below as of 17 Aug 2023 04:49  Patient On (Oxygen Delivery Method): room air        GENERAL: NAD, lying in bed comfortably,.  HEAD:  Atraumatic, Normocephalic  EYES: EOMI, PERRLA, conjunctiva and sclera clear  ENT: Moist oral mucous   NECK: Supple, No JVD  CHEST/LUNG: Clear to auscultation bilaterally; No rales, rhonchi, or wheezing.  HEART: S1,S2. Regular rate and rhythm. No murmurs  ABDOMEN: Bowel sounds present. Soft, nontender, nondistended. No hepatomegaly  EXTREMITIES:  +LUE edema 2/2 infiltrated IV, LLE chronic swelling unchanged from day prior. No clubbing, cyanosis  NERVOUS SYSTEM:  Alert & Oriented X3, speech clear. No acute deficit. Non focal   SKIN: Warm.     LABS:                        10.9   8.15  )-----------( 261      ( 17 Aug 2023 08:25 )             35.6     CBC Full  -  ( 17 Aug 2023 08:25 )  WBC Count : 8.15 K/uL  Hemoglobin : 10.9 g/dL  Hematocrit : 35.6 %  Platelet Count - Automated : 261 K/uL  Mean Cell Volume : 80.2 fl  Mean Cell Hemoglobin : 24.5 pg  Mean Cell Hemoglobin Concentration : 30.6 gm/dL  Auto Neutrophil # : x  Auto Lymphocyte # : x  Auto Monocyte # : x  Auto Eosinophil # : x  Auto Basophil # : x  Auto Neutrophil % : x  Auto Lymphocyte % : x  Auto Monocyte % : x  Auto Eosinophil % : x  Auto Basophil % : x    17 Aug 2023 08:25    140    |  107    |  14     ----------------------------<  100    3.8     |  27     |  0.86     Ca    8.7        17 Aug 2023 08:25  Phos  2.6       17 Aug 2023 08:25  Mg     2.1       17 Aug 2023 08:25    TPro  7.1    /  Alb  3.4    /  TBili  0.5    /  DBili  x      /  AST  12     /  ALT  17     /  AlkPhos  91     17 Aug 2023 08:25    PT/INR - ( 17 Aug 2023 08:25 )   PT: 26.0 sec;   INR: 2.28 ratio         PTT - ( 16 Aug 2023 06:45 )  PTT:33.1 sec  Urinalysis Basic - ( 17 Aug 2023 08:25 )    Color: x / Appearance: x / SG: x / pH: x  Gluc: 100 mg/dL / Ketone: x  / Bili: x / Urobili: x   Blood: x / Protein: x / Nitrite: x   Leuk Esterase: x / RBC: x / WBC x   Sq Epi: x / Non Sq Epi: x / Bacteria: x      CAPILLARY BLOOD GLUCOSE              RADIOLOGY & ADDITIONAL TESTS:  TRANSTHORACIC ECHOCARDIOGRAM Study Date:    8/16/2023  Referring Physician:    [id number] Taco Sainz  Interpreting Physician: Yayo Desouza  CONCLUSIONS:   1. Technically difficult image quality.   2. Left ventricular systolic function is mildly decreased with an ejection fraction visually estimated at 40 to 45 %. There is global left ventricular hypokinesis.   3. The left atrium is mildly dilated in size.   4. Trace mitral regurgitation.   5. Mild thickening of the aortic valve leaflets.   6. Trace tricuspid regurgitation.      Personally reviewed.     Consultant(s) Notes Reviewed:  [x] YES  [ ] NO
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
## 2023-08-17 NOTE — PROVIDER CONTACT NOTE (OTHER) - RECOMMENDATIONS
Dr. Moon made aware. Pt on cardizem PO q6h and Amio gtt @16.7ml/hr.
need to give any more covering dose
Come see pt at bedside

## 2023-08-17 NOTE — PROGRESS NOTE ADULT - ASSESSMENT
pt with new onset of atrial flutter  s/p dvt 2019 and pe- on coumadin  s/p gastric bypassand- gastric sleeve  pt is started on amiodarone -if pt does not convert to rsr- pt will need dc cardioversion-discussed with pt and family  pt remains in atrial flutter- needs dc version - scheduled tomorrow 8/16   chf on xray chest-- lasix ordered  anemia- basic work up   iron deficiency anemia-on iron  remains in atrial flutter-needs dc cardiversion 8/16  risks /benefits including cva explained to pt - pt understood and will give consent for dc cardioversion 8/16   to have dc cardioversion  8/18

## 2023-08-17 NOTE — PROGRESS NOTE ADULT - PROBLEM SELECTOR PLAN 1
Pt with palpitations x3 days, 1x episode of lightheadedness, presenting from office due to EKG with a-flutter . On admission EKG showing afib/flutter with SVT .    - continue on amiodarone drip as ordered  - continue on cardizem 60mg PO q6hr  - patient is already on warfarin due to hx of PE/DVT, INR today is 2.28. Give Warfarin 7.5mg, oral, once, stop after 1 dose  - f/u cardio recs (Dr Valdes)  - TSH 2.78  - cardioversion rescheduled for friday 8/18.  pt's liraglutide needed to be stopped 7 days prior to elective procedure for anesthesia or conscious sedation Pt with palpitations x3 days, 1x episode of lightheadedness, presenting from office due to EKG with a-flutter . On admission EKG showing afib/flutter with SVT . TSH 2.78. TTE: EF 40-45%    - continue on amiodarone drip as ordered  - continue on cardizem 60mg PO q6hr  - patient is already on warfarin due to hx of PE/DVT, INR today is 2.28. Give Warfarin 7.5mg, oral, once, stop after 1 dose  - f/u cardio recs (Dr Valdes)  - cardioversion rescheduled for friday 8/18.  pt's liraglutide needed to be stopped 7 days prior to elective procedure for anesthesia or conscious sedation

## 2023-08-17 NOTE — ASSESSMENT
[FreeTextEntry1] : After EKG was taken, patient is discovered to have atrial flutter at a rate of 136.

## 2023-08-17 NOTE — PROVIDER CONTACT NOTE (OTHER) - SITUATION
Pt c/o new onset numbness of left hand radiating up left forearm
HR sustaining 126-130s
Pt on amiodarone drip 16.7 ml on going ,but still HR running 125-130 in rate

## 2023-08-17 NOTE — PROGRESS NOTE ADULT - ASSESSMENT
63yo M with PMH DVT of LLE and b/l PE (2019) on lifetime warfarin, s/p gastric bypass and gastric sleeve presents to the ED on 8/14/23 due to abnormal EKG in outpt office, admitted to medicine for management of a-flutter RVR.

## 2023-08-18 ENCOUNTER — LABORATORY RESULT (OUTPATIENT)
Age: 63
End: 2023-08-18

## 2023-08-18 LAB
ALBUMIN SERPL ELPH-MCNC: 3 G/DL — LOW (ref 3.3–5)
ALP SERPL-CCNC: 79 U/L — SIGNIFICANT CHANGE UP (ref 40–120)
ALT FLD-CCNC: 13 U/L — SIGNIFICANT CHANGE UP (ref 12–78)
ANION GAP SERPL CALC-SCNC: 6 MMOL/L — SIGNIFICANT CHANGE UP (ref 5–17)
APTT BLD: 33.3 SEC — SIGNIFICANT CHANGE UP (ref 24.5–35.6)
AST SERPL-CCNC: 11 U/L — LOW (ref 15–37)
BILIRUB SERPL-MCNC: 0.5 MG/DL — SIGNIFICANT CHANGE UP (ref 0.2–1.2)
BUN SERPL-MCNC: 14 MG/DL — SIGNIFICANT CHANGE UP (ref 7–23)
CALCIUM SERPL-MCNC: 8.5 MG/DL — SIGNIFICANT CHANGE UP (ref 8.5–10.1)
CHLORIDE SERPL-SCNC: 108 MMOL/L — SIGNIFICANT CHANGE UP (ref 96–108)
CO2 SERPL-SCNC: 27 MMOL/L — SIGNIFICANT CHANGE UP (ref 22–31)
CREAT SERPL-MCNC: 0.7 MG/DL — SIGNIFICANT CHANGE UP (ref 0.5–1.3)
EGFR: 105 ML/MIN/1.73M2 — SIGNIFICANT CHANGE UP
GLUCOSE SERPL-MCNC: 90 MG/DL — SIGNIFICANT CHANGE UP (ref 70–99)
HCT VFR BLD CALC: 32.8 % — LOW (ref 39–50)
HGB BLD-MCNC: 10 G/DL — LOW (ref 13–17)
INR BLD: 2.59 RATIO — HIGH (ref 0.85–1.18)
MAGNESIUM SERPL-MCNC: 2.2 MG/DL — SIGNIFICANT CHANGE UP (ref 1.6–2.6)
MCHC RBC-ENTMCNC: 24.3 PG — LOW (ref 27–34)
MCHC RBC-ENTMCNC: 30.5 GM/DL — LOW (ref 32–36)
MCV RBC AUTO: 79.8 FL — LOW (ref 80–100)
NRBC # BLD: 0 /100 WBCS — SIGNIFICANT CHANGE UP (ref 0–0)
PHOSPHATE SERPL-MCNC: 2.8 MG/DL — SIGNIFICANT CHANGE UP (ref 2.5–4.5)
PLATELET # BLD AUTO: 222 K/UL — SIGNIFICANT CHANGE UP (ref 150–400)
POTASSIUM SERPL-MCNC: 3.9 MMOL/L — SIGNIFICANT CHANGE UP (ref 3.5–5.3)
POTASSIUM SERPL-SCNC: 3.9 MMOL/L — SIGNIFICANT CHANGE UP (ref 3.5–5.3)
PROT SERPL-MCNC: 6.3 G/DL — SIGNIFICANT CHANGE UP (ref 6–8.3)
PROTHROM AB SERPL-ACNC: 29.5 SEC — HIGH (ref 9.5–13)
RBC # BLD: 4.11 M/UL — LOW (ref 4.2–5.8)
RBC # FLD: 18.9 % — HIGH (ref 10.3–14.5)
SODIUM SERPL-SCNC: 141 MMOL/L — SIGNIFICANT CHANGE UP (ref 135–145)
WBC # BLD: 7.4 K/UL — SIGNIFICANT CHANGE UP (ref 3.8–10.5)
WBC # FLD AUTO: 7.4 K/UL — SIGNIFICANT CHANGE UP (ref 3.8–10.5)

## 2023-08-18 PROCEDURE — 99232 SBSQ HOSP IP/OBS MODERATE 35: CPT | Mod: GC

## 2023-08-18 PROCEDURE — 99233 SBSQ HOSP IP/OBS HIGH 50: CPT

## 2023-08-18 PROCEDURE — 92960 CARDIOVERSION ELECTRIC EXT: CPT

## 2023-08-18 PROCEDURE — 93010 ELECTROCARDIOGRAM REPORT: CPT

## 2023-08-18 RX ORDER — ACETAMINOPHEN 500 MG
1000 TABLET ORAL ONCE
Refills: 0 | Status: COMPLETED | OUTPATIENT
Start: 2023-08-18 | End: 2023-08-18

## 2023-08-18 RX ORDER — AMIODARONE HYDROCHLORIDE 400 MG/1
TABLET ORAL
Refills: 0 | Status: DISCONTINUED | OUTPATIENT
Start: 2023-08-18 | End: 2023-08-19

## 2023-08-18 RX ORDER — AMIODARONE HYDROCHLORIDE 400 MG/1
200 TABLET ORAL DAILY
Refills: 0 | Status: CANCELLED | OUTPATIENT
Start: 2023-08-22 | End: 2023-08-19

## 2023-08-18 RX ORDER — AMIODARONE HYDROCHLORIDE 400 MG/1
400 TABLET ORAL EVERY 8 HOURS
Refills: 0 | Status: DISCONTINUED | OUTPATIENT
Start: 2023-08-18 | End: 2023-08-19

## 2023-08-18 RX ORDER — WARFARIN SODIUM 2.5 MG/1
7.5 TABLET ORAL ONCE
Refills: 0 | Status: COMPLETED | OUTPATIENT
Start: 2023-08-18 | End: 2023-08-18

## 2023-08-18 RX ADMIN — Medication 400 MILLIGRAM(S): at 23:41

## 2023-08-18 RX ADMIN — Medication 60 MILLIGRAM(S): at 23:41

## 2023-08-18 RX ADMIN — Medication 100 MILLIGRAM(S): at 12:45

## 2023-08-18 RX ADMIN — Medication 400 MILLIGRAM(S): at 05:50

## 2023-08-18 RX ADMIN — WARFARIN SODIUM 7.5 MILLIGRAM(S): 2.5 TABLET ORAL at 22:38

## 2023-08-18 RX ADMIN — AMIODARONE HYDROCHLORIDE 400 MILLIGRAM(S): 400 TABLET ORAL at 14:35

## 2023-08-18 RX ADMIN — AMIODARONE HYDROCHLORIDE 400 MILLIGRAM(S): 400 TABLET ORAL at 22:38

## 2023-08-18 RX ADMIN — Medication 325 MILLIGRAM(S): at 12:45

## 2023-08-18 RX ADMIN — Medication 3 MILLIGRAM(S): at 23:41

## 2023-08-18 RX ADMIN — Medication 60 MILLIGRAM(S): at 18:52

## 2023-08-18 RX ADMIN — Medication 1 MILLIGRAM(S): at 12:45

## 2023-08-18 NOTE — PROGRESS NOTE ADULT - PROBLEM SELECTOR PLAN 1
- amio drip d/oscar, transition to oral.   - continue on cardizem 60mg PO q6hr  - patient is already on warfarin due to hx of PE/DVT, Dose daily by INR keep INR goal 2-3.  - cardio recs appreciated (Lucio)  - dc cardioversion this AM successful following SHERLEY performed r/o clot. Will monitor patient on tele for 24 hours and likely discharge tomorrow 8/19.

## 2023-08-18 NOTE — PROGRESS NOTE ADULT - SUBJECTIVE AND OBJECTIVE BOX
Patient is a 62y old  Male who presents with a chief complaint of Aflutter w/ RVR (18 Aug 2023 09:38)      INTERVAL HPI/OVERNIGHT EVENTS: Patient seen and examined at the bedside. There were no acute events overnight.  This morning patient had SHERLEY to r/o clot followed by DC Cardioversion. This was successful and the patient returned to sinus rhythm. Patient denies any complaints at this time and states he feels well.    MEDICATIONS  (STANDING):  aMIOdarone    Tablet 400 milliGRAM(s) Oral every 8 hours  aMIOdarone    Tablet   Oral   diltiazem    Tablet 60 milliGRAM(s) Oral every 6 hours  ferrous    sulfate 325 milliGRAM(s) Oral daily  folic acid 1 milliGRAM(s) Oral daily  warfarin 7.5 milliGRAM(s) Oral once    MEDICATIONS  (PRN):  LORazepam   Injectable 2 milliGRAM(s) IV Push every 2 hours PRN CIWA-Ar score increase by 2 points and a total score of 7 or less  LORazepam   Injectable 2 milliGRAM(s) IV Push every 1 hour PRN CIWA-Ar score 8 or greater  melatonin 3 milliGRAM(s) Oral at bedtime PRN Insomnia      Allergies    No Known Allergies    Intolerances        REVIEW OF SYSTEMS:  CONSTITUTIONAL: No fever or chills  HEENT:  No headache, no sore throat  RESPIRATORY: No cough, wheezing, or shortness of breath  CARDIOVASCULAR: No chest pain, palpitations  GASTROINTESTINAL: No abd pain, nausea, vomiting, or diarrhea  GENITOURINARY: No dysuria, frequency, or hematuria  NEUROLOGICAL: no focal weakness or dizziness  MUSCULOSKELETAL: no myalgias     Vital Signs Last 24 Hrs  T(C): 36.5 (18 Aug 2023 11:59), Max: 36.7 (18 Aug 2023 09:03)  T(F): 97.7 (18 Aug 2023 11:59), Max: 98.1 (18 Aug 2023 09:03)  HR: 66 (18 Aug 2023 11:59) (66 - 113)  BP: 114/77 (18 Aug 2023 11:59) (89/58 - 117/89)  BP(mean): --  RR: 18 (18 Aug 2023 11:59) (16 - 25)  SpO2: 96% (18 Aug 2023 11:59) (93% - 100%)    Parameters below as of 18 Aug 2023 11:59  Patient On (Oxygen Delivery Method): room air        GENERAL: NAD, lying in bed comfortably,.  HEAD:  Atraumatic, Normocephalic  EYES: EOMI, PERRLA, conjunctiva and sclera clear  ENT: Moist oral mucous   NECK: Supple, No JVD  CHEST/LUNG: Clear to auscultation bilaterally; No rales, rhonchi, or wheezing.  HEART: S1,S2. Regular rate and rhythm. No murmurs  ABDOMEN: Bowel sounds present. Soft, nontender, nondistended. No hepatomegaly  EXTREMITIES:  +LUE edema 2/2 infiltrated IV, LLE chronic swelling unchanged from day prior. No clubbing, cyanosis  NERVOUS SYSTEM:  Alert & Oriented X3, speech clear. No acute deficit. Non focal   SKIN: Warm.     LABS:                        10.0   7.40  )-----------( 222      ( 18 Aug 2023 07:20 )             32.8     CBC Full  -  ( 18 Aug 2023 07:20 )  WBC Count : 7.40 K/uL  Hemoglobin : 10.0 g/dL  Hematocrit : 32.8 %  Platelet Count - Automated : 222 K/uL  Mean Cell Volume : 79.8 fl  Mean Cell Hemoglobin : 24.3 pg  Mean Cell Hemoglobin Concentration : 30.5 gm/dL  Auto Neutrophil # : x  Auto Lymphocyte # : x  Auto Monocyte # : x  Auto Eosinophil # : x  Auto Basophil # : x  Auto Neutrophil % : x  Auto Lymphocyte % : x  Auto Monocyte % : x  Auto Eosinophil % : x  Auto Basophil % : x    18 Aug 2023 07:20    141    |  108    |  14     ----------------------------<  90     3.9     |  27     |  0.70     Ca    8.5        18 Aug 2023 07:20  Phos  2.8       18 Aug 2023 07:20  Mg     2.2       18 Aug 2023 07:20    TPro  6.3    /  Alb  3.0    /  TBili  0.5    /  DBili  x      /  AST  11     /  ALT  13     /  AlkPhos  79     18 Aug 2023 07:20    PT/INR - ( 18 Aug 2023 07:20 )   PT: 29.5 sec;   INR: 2.59 ratio         PTT - ( 18 Aug 2023 07:20 )  PTT:33.3 sec  Urinalysis Basic - ( 18 Aug 2023 07:20 )    Color: x / Appearance: x / SG: x / pH: x  Gluc: 90 mg/dL / Ketone: x  / Bili: x / Urobili: x   Blood: x / Protein: x / Nitrite: x   Leuk Esterase: x / RBC: x / WBC x   Sq Epi: x / Non Sq Epi: x / Bacteria: x      CAPILLARY BLOOD GLUCOSE              RADIOLOGY & ADDITIONAL TESTS:    Personally reviewed.     Consultant(s) Notes Reviewed:  [x] YES  [ ] NO

## 2023-08-18 NOTE — PROGRESS NOTE ADULT - ASSESSMENT
pt with new onset of atrial flutter  s/p dvt 2019 and pe- on coumadin  s/p gastric bypassand- gastric sleeve  pt is started on amiodarone -if pt does not convert to rsr- pt will need dc cardioversion-discussed with pt and family  pt remains in atrial flutter- needs dc version - scheduled tomorrow 8/16   chf on xray chest-- lasix ordered  anemia- basic work up   iron deficiency anemia-on iron  remains in atrial flutter-needs dc cardiversion 8/16  risks /benefits including cva explained to pt - pt understood and will give consent for dc cardioversion 8/16   to have dc cardioversion  8/18  atrial flutter with rapid vr persisis-to have dc cardiversion 8/18   pt with new onset of atrial flutter  s/p dvt 2019 and pe- on coumadin  s/p gastric bypassand- gastric sleeve  pt is started on amiodarone -if pt does not convert to rsr- pt will need dc cardioversion-discussed with pt and family  pt remains in atrial flutter- needs dc version - scheduled tomorrow 8/16   chf on xray chest-- lasix ordered  anemia- basic work up   iron deficiency anemia-on iron  remains in atrial flutter-needs dc cardiversion 8/16  risks /benefits including cva explained to pt - pt understood and will give consent for dc cardioversion 8/16   to have dc cardioversion  8/18  atrial flutter with rapid vr persisis-to have dc cardiversion 8/18  ADDENDUM:CARDIVERSION NOTE:     Pt had linh done by dr castillo - mild mr and tr - no thromus and clott. pt was premedicated by ELAN ROGERSAND  PT WAS BEING MONITORED.PADS WERE APPLIED ANTERIOR AND POSTERIORLY.PT WAS SYNCHRONISEDAND SHOCKED WITH 100 JOULES WITH CONVERION TO RSR.   PT TOLERATED IT WELL .

## 2023-08-18 NOTE — PROGRESS NOTE ADULT - ATTENDING COMMENTS
Hospitalist attending  I have personally seen and examined the patient.  I fully participated in the care of this patient.  I have made amendments to the documentation where necessary, and agree with the history, physical exam, and plan as documented by the resident.    Vital Signs Last 24 Hrs  T(F): 97.7 (18 Aug 2023 11:59), Max: 98.1 (18 Aug 2023 09:03)  HR: 68 (18 Aug 2023 12:50) (66 - 113)  BP: 96/63 (18 Aug 2023 12:50) (89/58 - 117/89)  RR: 18 (18 Aug 2023 12:50) (16 - 25)  SpO2: 92% (18 Aug 2023 12:50) (92% - 100%)    Physical Exam:  GENERAL: NAD, lying in bed comfortably,.  CHEST/LUNG: Clear to auscultation bilaterally; No rales, rhonchi, or wheezing.  HEART: S1,S2. Regular rate and rhythm. No murmurs  ABDOMEN: Bowel sounds present. Soft, nontender, nondistended. No hepatomegaly  EXTREMITIES:  +LUE edema 2/2 infiltrated IV, LLE chronic swelling unchanged from day prior. No clubbing, cyanosis  NERVOUS SYSTEM:  Alert & Oriented X3, speech clear. No acute deficit. Non focal   SKIN: Warm.     63yo M with PMH DVT of LLE and b/l PE (2019) on lifetime warfarin, s/p gastric bypass and gastric sleeve presents to the ED on 8/14/23 due to abnormal EKG in outpt office, admitted to medicine for management of a-flutter RVR.     #new onset afib/flutter, converted to SR  #anemia  #EtOH dependence / withdrawal  #DVT/PE  #gastric bypass  #obesity      - S/p successful cardioversion on 8/18. Transitioned to PO amiodarone  - Will monitor overnight and discharge tomorrow  - Monitor PT/INR and dose coumadin
The patient was seen and evaluated independently by the attending physician.  - I have personally reviewed the pt's labs, imaging, micro data and consultant recommendations.     61yo M with PMH DVT of LLE and b/l PE (2019) on lifetime warfarin, s/p gastric bypass and gastric sleeve presents to the ED on 8/14/23 due to abnormal EKG in outpt office, admitted to medicine for management of a-flutter RVR.     - vital signs personally reviewed - afebrile, HR’s poor controlled - sustaining above 130 most of the afternoon today - BP stable, on room air  - lab results personally reviewed - no leukocytosis, h/h stable, stable renal indices, low K  - radiology images reviewed -     #new onset afib/flutter, converted to SR  #anemia  #EtOH dependence / withdrawal  #DVT/PE  #gastric bypass  #obesity    - pt planned for cardioversion after 7d off the weight loss drug  - spoke w/ son over the phone  - CV: will continue aMIOdarone Infusion 0.5 mG/Min (16.7 mL/Hr) IV Continuous <Continuous>  - CV: will continue diltiazem Tablet 30 milliGRAM(s) Oral every 6 hours  - MSK: will continue acetaminophen Tablet .. 650 milliGRAM(s) Oral every 6 hours PRN Mild Pain (1 - 3)  - NUTRITION: will continue folic acid 1 milliGRAM(s) Oral daily  - NUTRITION: will continue iron sucrose Injectable 100 milliGRAM(s) IV Push every 24 hours  - NUTRITION: will continue thiamine 100 milliGRAM(s) Oral daily  - PSYCH: will continue LORazepam Injectable 2 milliGRAM(s) IV Push every 2 hours PRN CIWA-Ar score increase by 2 points and a total score of 7 or less  - PSYCH: will continue LORazepam Injectable 2 milliGRAM(s) IV Push every 1 hour PRN CIWA-Ar score 8 or greater  - PSYCH: will continue melatonin 3 milliGRAM(s) Oral at bedtime PRN Insomnia  - gi ppx - n/a  - vte ppx - n/a  - diet - Diet, DASH/TLC:   Sodium & Cholesterol Restricted (08-16-23 @ 14:00) [Active]  Diet, NPO after Midnight:      NPO Start Date: 15-Aug-2023,   NPO Start Time: 23:59 (08-15-23 @ 08:58) [Active]  - code status - full  - dispo - TBD pending clinical course -
The patient was seen and evaluated independently by the attending physician.  - I have personally reviewed the pt's labs, imaging, micro data and consultant recommendations.     61yo M with PMH DVT of LLE and b/l PE (2019) on lifetime warfarin, s/p gastric bypass and gastric sleeve presents to the ED on 8/14/23 due to abnormal EKG in outpt office, admitted to medicine for management of a-flutter RVR.     - vital signs personally reviewed - afebrile, rates poorly controlled, BP is stable, on room air  - lab results personally reviewed - no leukocytosis, h/h stable, lytes stable  - radiology images reviewed - nothing new, serial EKG’s    #new onset afib/flutter, converted to SR  #anemia  #EtOH dependence / withdrawal  #DVT/PE  #gastric bypass  #obesity    - planning for cardioversion tomorrow - cardiology following   - increased cardizem to 60 today  - CV: will continue aMIOdarone Infusion 0.5 mG/Min (16.7 mL/Hr) IV Continuous <Continuous>  - CV: will continue diltiazem Tablet 60 milliGRAM(s) Oral every 6 hours  - CV: will continue warfarin 7.5 milliGRAM(s) Oral once  - MSK: will continue acetaminophen Tablet .. 650 milliGRAM(s) Oral every 6 hours  - PSYCH: will continue LORazepam Injectable 2 milliGRAM(s) IV Push every 2 hours PRN CIWA-Ar score increase by 2 points and a total score of 7 or less  - PSYCH: will continue LORazepam Injectable 2 milliGRAM(s) IV Push every 1 hour PRN CIWA-Ar score 8 or greater  - PSYCH: will continue melatonin 3 milliGRAM(s) Oral at bedtime PRN Insomnia  - SUPPLEMENT: will continue ferrous sulfate 325 milliGRAM(s) Oral daily  - SUPPLEMENT: will continue folic acid 1 milliGRAM(s) Oral daily  - SUPPLEMENT: will continue thiamine 100 milliGRAM(s) Oral daily  - gi ppx - N/A  - vte ppx - INR therapeutic  - diet - Diet, NPO after Midnight:      NPO Start Date: 17-Aug-2023,   NPO Start Time: 23:59 (08-17-23 @ 09:07) [Active]  Diet, DASH/TLC:   Sodium & Cholesterol Restricted (08-16-23 @ 14:00) [Active]  - code status - full  - dispo - TBD pending clinical course -
Patient seen in bedside chair   reports feeling better  denies any dyspnea     A/p:  new onset afib    - cardiology following Dr Valdes.     - plan for cardioversion in AM if not converted to NSR on Amiodarone gtt    h/o PE and DVT    - chart reviewed.     - patient appears to have failed xarelto. He was on Xarelto and developed progression of his DVT and subsequent PE. note in HIE from 12/2019 by hematology.     - cont coumadin (8mg ordered tonight). INR in AM. goal 2-3    anemia  s/p gastric bypass  obesity     - iron studies    - patient has history of iron deficiency from history of gastric bypass.     - consider IV iron. pending ferritin     DVT proph: coumadin 8mg x 1 dose. INR in AM

## 2023-08-18 NOTE — CASE MANAGEMENT PROGRESS NOTE - NSCMPROGRESSNOTE_GEN_ALL_CORE
Discussed pt in rounds plan for cardioversion  today. Pt s/p cardioversion, remains on amiodarone drip.
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
## 2023-08-18 NOTE — PROGRESS NOTE ADULT - PROBLEM SELECTOR PLAN 3
PMH LLE DVT and b/l PE in 2019, on warfarin 7.5mg daily at night, INR on admission 1.99 -> 1.88 -> 1.84    - f/u AM INR daily  - give 7.5mg warfarin 8/17, reassess INR tomorrow PMH LLE DVT and b/l PE in 2019, on warfarin 7.5mg daily at night    - dose coumadin based on daily inr

## 2023-08-18 NOTE — PROGRESS NOTE ADULT - PROBLEM SELECTOR PLAN 4
DVT ppx: continue warfarin dose per daily INR    Abnormal CXR: Borderline cardiomegaly and slight prominence to perihilar interstitial markings less obvious than on previous exam. Positive findings probably related to the portable technique  - f/u CXR 8/15: IMPRESSION: No acute finding or change    dispo - likely d/c home 8/19
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
## 2023-08-19 ENCOUNTER — TRANSCRIPTION ENCOUNTER (OUTPATIENT)
Age: 63
End: 2023-08-19

## 2023-08-19 VITALS
DIASTOLIC BLOOD PRESSURE: 64 MMHG | SYSTOLIC BLOOD PRESSURE: 108 MMHG | OXYGEN SATURATION: 97 % | TEMPERATURE: 98 F | HEART RATE: 73 BPM | RESPIRATION RATE: 18 BRPM

## 2023-08-19 LAB
ALBUMIN SERPL ELPH-MCNC: 2.8 G/DL — LOW (ref 3.3–5)
ALP SERPL-CCNC: 73 U/L — SIGNIFICANT CHANGE UP (ref 40–120)
ALT FLD-CCNC: 14 U/L — SIGNIFICANT CHANGE UP (ref 12–78)
ANION GAP SERPL CALC-SCNC: 5 MMOL/L — SIGNIFICANT CHANGE UP (ref 5–17)
AST SERPL-CCNC: 11 U/L — LOW (ref 15–37)
BILIRUB SERPL-MCNC: 0.5 MG/DL — SIGNIFICANT CHANGE UP (ref 0.2–1.2)
BUN SERPL-MCNC: 16 MG/DL — SIGNIFICANT CHANGE UP (ref 7–23)
CALCIUM SERPL-MCNC: 8.3 MG/DL — LOW (ref 8.5–10.1)
CHLORIDE SERPL-SCNC: 108 MMOL/L — SIGNIFICANT CHANGE UP (ref 96–108)
CO2 SERPL-SCNC: 29 MMOL/L — SIGNIFICANT CHANGE UP (ref 22–31)
CREAT SERPL-MCNC: 0.78 MG/DL — SIGNIFICANT CHANGE UP (ref 0.5–1.3)
EGFR: 102 ML/MIN/1.73M2 — SIGNIFICANT CHANGE UP
GLUCOSE SERPL-MCNC: 93 MG/DL — SIGNIFICANT CHANGE UP (ref 70–99)
HCT VFR BLD CALC: 30.2 % — LOW (ref 39–50)
HGB BLD-MCNC: 9.4 G/DL — LOW (ref 13–17)
INR BLD: 2.83 RATIO — HIGH (ref 0.85–1.18)
MAGNESIUM SERPL-MCNC: 2.3 MG/DL — SIGNIFICANT CHANGE UP (ref 1.6–2.6)
MCHC RBC-ENTMCNC: 24.9 PG — LOW (ref 27–34)
MCHC RBC-ENTMCNC: 31.1 GM/DL — LOW (ref 32–36)
MCV RBC AUTO: 80.1 FL — SIGNIFICANT CHANGE UP (ref 80–100)
NRBC # BLD: 0 /100 WBCS — SIGNIFICANT CHANGE UP (ref 0–0)
PHOSPHATE SERPL-MCNC: 3 MG/DL — SIGNIFICANT CHANGE UP (ref 2.5–4.5)
PLATELET # BLD AUTO: 219 K/UL — SIGNIFICANT CHANGE UP (ref 150–400)
POTASSIUM SERPL-MCNC: 4.1 MMOL/L — SIGNIFICANT CHANGE UP (ref 3.5–5.3)
POTASSIUM SERPL-SCNC: 4.1 MMOL/L — SIGNIFICANT CHANGE UP (ref 3.5–5.3)
PROT SERPL-MCNC: 6.2 G/DL — SIGNIFICANT CHANGE UP (ref 6–8.3)
PROTHROM AB SERPL-ACNC: 32.1 SEC — HIGH (ref 9.5–13)
RBC # BLD: 3.77 M/UL — LOW (ref 4.2–5.8)
RBC # FLD: 19 % — HIGH (ref 10.3–14.5)
SODIUM SERPL-SCNC: 142 MMOL/L — SIGNIFICANT CHANGE UP (ref 135–145)
WBC # BLD: 7.88 K/UL — SIGNIFICANT CHANGE UP (ref 3.8–10.5)
WBC # FLD AUTO: 7.88 K/UL — SIGNIFICANT CHANGE UP (ref 3.8–10.5)

## 2023-08-19 PROCEDURE — 99239 HOSP IP/OBS DSCHRG MGMT >30: CPT | Mod: GC

## 2023-08-19 PROCEDURE — 99233 SBSQ HOSP IP/OBS HIGH 50: CPT

## 2023-08-19 PROCEDURE — 93010 ELECTROCARDIOGRAM REPORT: CPT

## 2023-08-19 RX ORDER — FERROUS SULFATE 325(65) MG
1 TABLET ORAL
Qty: 0 | Refills: 0 | DISCHARGE
Start: 2023-08-19

## 2023-08-19 RX ORDER — AMIODARONE HYDROCHLORIDE 400 MG/1
1 TABLET ORAL
Qty: 80 | Refills: 0
Start: 2023-08-19

## 2023-08-19 RX ORDER — AMIODARONE HYDROCHLORIDE 400 MG/1
1 TABLET ORAL
Qty: 0 | Refills: 0 | DISCHARGE
Start: 2023-08-19

## 2023-08-19 RX ORDER — FOLIC ACID 0.8 MG
1 TABLET ORAL
Qty: 0 | Refills: 0 | DISCHARGE
Start: 2023-08-19

## 2023-08-19 RX ORDER — DILTIAZEM HCL 120 MG
1 CAPSULE, EXT RELEASE 24 HR ORAL
Qty: 0 | Refills: 0 | DISCHARGE
Start: 2023-08-19

## 2023-08-19 RX ORDER — DILTIAZEM HCL 120 MG
240 CAPSULE, EXT RELEASE 24 HR ORAL DAILY
Refills: 0 | Status: DISCONTINUED | OUTPATIENT
Start: 2023-08-19 | End: 2023-08-19

## 2023-08-19 RX ORDER — DILTIAZEM HCL 120 MG
1 CAPSULE, EXT RELEASE 24 HR ORAL
Qty: 30 | Refills: 0
Start: 2023-08-19 | End: 2023-09-17

## 2023-08-19 RX ADMIN — Medication 60 MILLIGRAM(S): at 05:26

## 2023-08-19 RX ADMIN — Medication 325 MILLIGRAM(S): at 11:34

## 2023-08-19 RX ADMIN — Medication 1 MILLIGRAM(S): at 11:33

## 2023-08-19 RX ADMIN — AMIODARONE HYDROCHLORIDE 400 MILLIGRAM(S): 400 TABLET ORAL at 05:27

## 2023-08-19 RX ADMIN — AMIODARONE HYDROCHLORIDE 400 MILLIGRAM(S): 400 TABLET ORAL at 13:26

## 2023-08-19 RX ADMIN — Medication 240 MILLIGRAM(S): at 11:34

## 2023-08-19 NOTE — PROGRESS NOTE ADULT - REASON FOR ADMISSION
Aflutter w/ RVR

## 2023-08-19 NOTE — DISCHARGE NOTE NURSING/CASE MANAGEMENT/SOCIAL WORK - PATIENT PORTAL LINK FT
You can access the FollowMyHealth Patient Portal offered by [facility] by registering at the following website: [email or web address]. By joining [facility]’s FollowMyHealth portal, you will also be able to view your health information using other applications (apps) compatible with our system.

## 2023-08-19 NOTE — DISCHARGE NOTE PROVIDER - NSDCFUADDINST_GEN_ALL_CORE_FT
Trans-thoracic echocardiography results: (8/16/2023)     1. Technically difficult image quality.   2. Left ventricular systolic function is mildly decreased with an ejection fraction visually estimated at 40 to 45 %. There is global left ventricular hypokinesis.   3. The left atrium is mildly dilated in size.   4. Trace mitral regurgitation.   5. Mild thickening of the aortic valve leaflets.   6. Trace tricuspid regurgitation.
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
## 2023-08-19 NOTE — DISCHARGE NOTE NURSING/CASE MANAGEMENT/SOCIAL WORK - NSDCPEFALRISK_GEN_ALL_CORE
For information on Fall & Injury Prevention, visit: [email or web address] OR  [email or web address] OR  https://www.cdc.gov/steadi/patient.html

## 2023-08-19 NOTE — DISCHARGE NOTE PROVIDER - PROVIDER TOKENS
PROVIDER:[TOKEN:[[id number]],FOLLOWUP:[1 week],ESTABLISHEDPATIENT:[T]] PROVIDER:[TOKEN:[[id number]],FOLLOWUP:[1 week],ESTABLISHEDPATIENT:[T]],PROVIDER:[TOKEN:[[id number]],FOLLOWUP:[1 week]]

## 2023-08-19 NOTE — DISCHARGE NOTE PROVIDER - NSDCFUSCHEDAPPT_GEN_ALL_CORE_FT
Hailey Ceja  [facility]  ENDOCRIN [address]  Scheduled Appointment: 09/13/2023

## 2023-08-19 NOTE — PROGRESS NOTE ADULT - ASSESSMENT
pt with new onset of atrial flutter  s/p dvt 2019 and pe- on coumadin  s/p gastric bypass and- gastric sleeve  pt is started on amiodarone -if pt does not convert to rsr- pt will need dc cardioversion-discussed with pt and family  pt remains in atrial flutter- needs dc version - scheduled tomorrow 8/16   chf on xray chest-- lasix ordered  anemia- basic work up   iron deficiency anemia-on iron  remains in atrial flutter-needs dc cardiversion 8/16  risks /benefits including cva explained to pt - pt understood and will give consent for dc cardioversion 8/16   to have dc cardioversion  8/18  atrial flutter with rapid vr persisis-to have dc cardiversion 8/18  ADDENDUM:CARDIVERSION NOTE:     Pt had linh done by dr castillo - mild mr and tr - no thromus and clott. pt was premedicated by  dr ELAN GLASS  PT WAS BEING MONITORED.PADS WERE APPLIED ANTERIOR AND POSTERIORLY.PT WAS SYNCHRONISEDAND SHOCKED WITH 100 JOULES WITH CONVERION TO RSR.   PT TOLERATED IT WELL .  pt remains rsr-cardiac status stable for discharge -to follow up with pcp and hematology

## 2023-08-19 NOTE — DISCHARGE NOTE PROVIDER - CARE PROVIDER_API CALL
Yayo Valdes  Cardiovascular Disease  [address], [address]  [place] [address]  Phone: [phone]  Fax: [phone]  Established Patient  Follow Up Time: 1 week   Yayo Valdes  Cardiovascular Disease  [address], [address]  [place] [address]  Phone: [phone]  Fax: [phone]  Established Patient  Follow Up Time: 1 week    Dennis Farah  Medical Oncology  [address], [address]  [place] [address]  Phone: [phone]  Fax: [phone]  Follow Up Time: 1 week

## 2023-08-19 NOTE — CASE MANAGEMENT PROGRESS NOTE - NSCMPROGRESSNOTE_GEN_ALL_CORE
Patient is discharge home. CM met with patient to discussed discharge disposition with no formal services. Patient verbalized understanding of discharge plan and patient is in agreement.  Wife to transport in a private car. CM remains available throughout hospital stay.

## 2023-08-19 NOTE — PROGRESS NOTE ADULT - PROVIDER SPECIALTY LIST ADULT
Cardiology
Hospitalist
Cardiology
Cardiology
Hospitalist
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
## 2023-08-19 NOTE — DISCHARGE NOTE PROVIDER - NSDCMRMEDTOKEN_GEN_ALL_CORE_FT
Coumadin 7.5 mg oral tablet: 1 tab(s) orally once a day (at bedtime)   amiodarone 200 mg oral tablet: 1 tab(s) orally 2 times a day Take 2 tablets three times a day for three days, THEN on 8/23/2023 start taking 1 tablet two times per day everyday  Coumadin 7.5 mg oral tablet: 1 tab(s) orally once a day (at bedtime)  dilTIAZem 240 mg/24 hours oral capsule, extended release: 1 cap(s) orally once a day start taking on 8/20/2023  ferrous sulfate 325 mg (65 mg elemental iron) oral tablet: 1 tab(s) orally once a day  folic acid 1 mg oral tablet: 1 tab(s) orally once a day

## 2023-08-19 NOTE — DISCHARGE NOTE PROVIDER - NSDCCPCAREPLAN_GEN_ALL_CORE_FT
PRINCIPAL DISCHARGE DIAGNOSIS  Diagnosis: Atrial flutter  Assessment and Plan of Treatment: You came to the hospital because you had an episode of lightheadedness and seveerel days of heart palpitations. Your cardiologist found that you had an abnormal heart rhythm and sent you to the hospital to be treated. We confirmed this rhythm and found you to be in atrial flutter with rapid ventricular response. This is an abnormal heart rhythm where the heart is beating in an irregular and accelerated manner, leading to your palpitations and lightheadedness. This rhythm can be dangerous if not treated. We started you on intravenous medication called Amiodarone, a type of drug that corrects abnormal heart rhythms. We additionally gave you an oral medication called Cardizem (diltiazem) when the amiodarone was not sufficent enough on its own. Because you still maintained in atrial flutter with rapid ventricular response, you underwent cardioversion, a process by which we serafin the heart back into a regular rhythm. This procedure worked, returning your heart to normal rhythm. We then monitored you for an additional day and started you on oral versions of the same medications you were taking during your stay. It is important to be consistent with these medications to ensure your heart maintains a regular rhythm. You should follow up with your cardiologist Dr Valdes in one week to ensure you heart rhythm is still normal and to discuss your medication plan.      SECONDARY DISCHARGE DIAGNOSES  Diagnosis: Anemia  Assessment and Plan of Treatment: We found that you were anemic on arrival. Anemia is when you do not have enough red blood cells in your system, whcih can lead to symptoms like lightheadedness and fatigue. Because of your history of gastric bypass, you are at risk for iron deficiency. We confirmed that your anemia was due to iron deficiency and gave you iron supplementation while you were in the hospital. It is important to continue taking daily iron supplements to maintain a normal level of red blood cells in your body.    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 On arrival we found your INR level, a measure of your bloods readiness to form clots, to be lower than recommended for someone with your history. We took daily INR measurements while you were in the hospital and gave you daily warfarin doses to maintain an appropriate INR. It is important to be consistent with your daily INR checking, warfarin taking, and diet so as ti minimize fluctuations in your INR and to help prevent future clots from forming.     PRINCIPAL DISCHARGE DIAGNOSIS  Diagnosis: Atrial flutter  Assessment and Plan of Treatment: You came to the hospital because you had an episode of lightheadedness and seveerel days of heart palpitations. Your cardiologist found that you had an abnormal heart rhythm and sent you to the hospital to be treated. We confirmed this rhythm and found you to be in atrial flutter with rapid ventricular response. This is an abnormal heart rhythm where the heart is beating in an irregular and accelerated manner, leading to your palpitations and lightheadedness. This rhythm can be dangerous if not treated. We started you on intravenous medication called Amiodarone, a type of drug that corrects abnormal heart rhythms. We additionally gave you an oral medication called Cardizem (diltiazem) when the amiodarone was not sufficent enough on its own. Because you still maintained in atrial flutter with rapid ventricular response, you underwent cardioversion, a process by which we serafin the heart back into a regular rhythm. This procedure worked, returning your heart to normal rhythm. We then monitored you for an additional day and started you on oral versions of the same medications you were taking during your stay. It is important to be consistent with your new amiodarone and cardizem prescriptions to ensure your heart maintains a regular rhythm. You should follow up with your cardiologist Dr Valdes in one week to ensure you heart rhythm is still normal and to discuss your medication plan.      SECONDARY DISCHARGE DIAGNOSES  Diagnosis: Anemia  Assessment and Plan of Treatment: We found that you were anemic on arrival. Anemia is when you do not have enough red blood cells in your system, whcih can lead to symptoms like lightheadedness and fatigue. Because of your history of gastric bypass, you are at risk for iron deficiency. We confirmed that your anemia was due to iron deficiency and gave you iron supplementation while you were in the hospital. It is important to continue taking daily iron supplements to maintain a normal level of red blood cells in your body. If you would prefer to receive iron infusions once a month, please see Dr Farah, a hematologist who can evaluate you for this treatment.    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 On arrival we found your INR level, a measure of your bloods readiness to form clots, to be lower than recommended for someone with your history. We took daily INR measurements while you were in the hospital and gave you daily warfarin doses to maintain an appropriate INR. It is important to be consistent with your daily INR checking, warfarin taking, and diet so as ti minimize fluctuations in your INR and to help prevent future clots from forming. We have continued your 7.5mg once daily at bedtime dose.

## 2023-08-21 ENCOUNTER — NON-APPOINTMENT (OUTPATIENT)
Age: 63
End: 2023-08-21

## 2023-08-25 ENCOUNTER — APPOINTMENT (OUTPATIENT)
Dept: CARDIOLOGY | Facility: CLINIC | Age: 63
End: 2023-08-25

## 2023-08-25 ENCOUNTER — APPOINTMENT (OUTPATIENT)
Dept: INTERNAL MEDICINE | Facility: CLINIC | Age: 63
End: 2023-08-25
Payer: COMMERCIAL

## 2023-08-25 VITALS
WEIGHT: 315 LBS | DIASTOLIC BLOOD PRESSURE: 78 MMHG | BODY MASS INDEX: 41.75 KG/M2 | OXYGEN SATURATION: 98 % | TEMPERATURE: 97.8 F | HEART RATE: 58 BPM | HEIGHT: 73 IN | SYSTOLIC BLOOD PRESSURE: 120 MMHG

## 2023-08-25 PROCEDURE — 99496 TRANSJ CARE MGMT HIGH F2F 7D: CPT

## 2023-08-25 PROCEDURE — 85610 PROTHROMBIN TIME: CPT | Mod: QW

## 2023-08-25 RX ORDER — LIRAGLUTIDE 6 MG/ML
18 INJECTION, SOLUTION SUBCUTANEOUS
Qty: 3 | Refills: 0 | Status: DISCONTINUED | COMMUNITY
Start: 2023-06-27 | End: 2023-08-25

## 2023-08-25 RX ORDER — CHROMIUM 200 MCG
TABLET ORAL
Refills: 0 | Status: ACTIVE | COMMUNITY

## 2023-08-25 NOTE — HISTORY OF PRESENT ILLNESS
[FreeTextEntry1] : 61 yo male pt is present for hospital follow up. Pt was discharged from [facility] on Aug 19th where he was treated for atrial flutter. DC cardioversion was performed by Dr. Valdes. Pt will receive follow up INR & PT test today.

## 2023-08-25 NOTE — PLAN
[FreeTextEntry1] : Cardizem added to medication regimen.  Amiodarone to be continued as instructed.  Follow up with cardiologist Dr. Valdes.

## 2023-08-28 ENCOUNTER — APPOINTMENT (OUTPATIENT)
Dept: CARDIOLOGY | Facility: CLINIC | Age: 63
End: 2023-08-28
Payer: COMMERCIAL

## 2023-08-28 ENCOUNTER — NON-APPOINTMENT (OUTPATIENT)
Age: 63
End: 2023-08-28

## 2023-08-28 VITALS
OXYGEN SATURATION: 98 % | HEIGHT: 73 IN | BODY MASS INDEX: 41.75 KG/M2 | TEMPERATURE: 98.1 F | WEIGHT: 315 LBS | SYSTOLIC BLOOD PRESSURE: 118 MMHG | HEART RATE: 53 BPM | DIASTOLIC BLOOD PRESSURE: 70 MMHG

## 2023-08-28 DIAGNOSIS — R06.02 SHORTNESS OF BREATH: ICD-10-CM

## 2023-08-28 LAB
INR PPP: 3.1 RATIO
POCT-PROTHROMBIN TIME: 36.7 SECS

## 2023-08-28 PROCEDURE — 99213 OFFICE O/P EST LOW 20 MIN: CPT | Mod: 25

## 2023-08-28 PROCEDURE — 93000 ELECTROCARDIOGRAM COMPLETE: CPT

## 2023-08-28 RX ORDER — PEN NEEDLE, DIABETIC 29 G X1/2"
32G X 4 MM NEEDLE, DISPOSABLE MISCELLANEOUS
Qty: 1 | Refills: 3 | Status: DISCONTINUED | COMMUNITY
Start: 2023-06-27 | End: 2023-08-28

## 2023-08-28 NOTE — DISCUSSION/SUMMARY
[FreeTextEntry1] : EKG done revealed regular sinus rhythm at a rate of about 55/min.  Patient will continue present medications amiodarone and Cardizem.  Patient has had iron deficiency anemia.  Patient was advised to follow-up with the hematologist and gastroenterologist.  Patient was advised for coronary CTA for further evaluation of his cardiac status
(2) assistive person

## 2023-08-28 NOTE — REASON FOR VISIT
[Arrhythmia/ECG Abnorrmalities] : arrhythmia/ECG abnormalities [Other: ____] : [unfilled] [FreeTextEntry1] : 62 years old male was recently admitted in the hospital with atrial flutter and shortness of breath.  Patient was treated with IV amiodarone and Cardizem however patient did not convert to regular sinus rhythm–patient was cardioverted in the hospital patient's medications include Cardizem  mg p.o. once a day, amiodarone 200 mg p.o. once a day, patient also takes Coumadin 6.5 mg p.o. once a day for previous history of DVT and pulmonary emboli Past surgical history status post gastric bypass and sleev for marked obesity in the past

## 2023-09-08 ENCOUNTER — LABORATORY RESULT (OUTPATIENT)
Age: 63
End: 2023-09-08

## 2023-09-13 ENCOUNTER — APPOINTMENT (OUTPATIENT)
Dept: ENDOCRINOLOGY | Facility: CLINIC | Age: 63
End: 2023-09-13
Payer: COMMERCIAL

## 2023-09-13 VITALS
BODY MASS INDEX: 41.75 KG/M2 | DIASTOLIC BLOOD PRESSURE: 87 MMHG | HEIGHT: 73 IN | HEART RATE: 63 BPM | OXYGEN SATURATION: 98 % | WEIGHT: 315 LBS | SYSTOLIC BLOOD PRESSURE: 150 MMHG

## 2023-09-13 PROCEDURE — 99214 OFFICE O/P EST MOD 30 MIN: CPT

## 2023-09-15 ENCOUNTER — LABORATORY RESULT (OUTPATIENT)
Age: 63
End: 2023-09-15

## 2023-09-20 ENCOUNTER — APPOINTMENT (OUTPATIENT)
Dept: CT IMAGING | Facility: CLINIC | Age: 63
End: 2023-09-20
Payer: COMMERCIAL

## 2023-09-20 PROCEDURE — 75574 CT ANGIO HRT W/3D IMAGE: CPT

## 2023-09-22 ENCOUNTER — LABORATORY RESULT (OUTPATIENT)
Age: 63
End: 2023-09-22

## 2023-09-22 RX ORDER — AMIODARONE HYDROCHLORIDE 200 MG/1
200 TABLET ORAL DAILY
Qty: 90 | Refills: 0 | Status: ACTIVE | COMMUNITY
Start: 2023-09-22 | End: 1900-01-01

## 2023-09-29 ENCOUNTER — LABORATORY RESULT (OUTPATIENT)
Age: 63
End: 2023-09-29

## 2023-09-30 ENCOUNTER — APPOINTMENT (OUTPATIENT)
Dept: CARDIOLOGY | Facility: CLINIC | Age: 63
End: 2023-09-30

## 2023-10-02 ENCOUNTER — LABORATORY RESULT (OUTPATIENT)
Age: 63
End: 2023-10-02

## 2023-10-02 ENCOUNTER — NON-APPOINTMENT (OUTPATIENT)
Age: 63
End: 2023-10-02

## 2023-10-02 ENCOUNTER — APPOINTMENT (OUTPATIENT)
Dept: CARDIOLOGY | Facility: CLINIC | Age: 63
End: 2023-10-02
Payer: COMMERCIAL

## 2023-10-02 VITALS
WEIGHT: 315 LBS | TEMPERATURE: 98.2 F | OXYGEN SATURATION: 98 % | DIASTOLIC BLOOD PRESSURE: 90 MMHG | HEART RATE: 63 BPM | SYSTOLIC BLOOD PRESSURE: 120 MMHG | BODY MASS INDEX: 41.75 KG/M2 | HEIGHT: 73 IN

## 2023-10-02 DIAGNOSIS — I26.99 OTHER PULMONARY EMBOLISM W/OUT ACUTE COR PULMONALE: ICD-10-CM

## 2023-10-02 DIAGNOSIS — I48.92 UNSPECIFIED ATRIAL FLUTTER: ICD-10-CM

## 2023-10-02 PROCEDURE — 99213 OFFICE O/P EST LOW 20 MIN: CPT | Mod: 25

## 2023-10-02 PROCEDURE — 93000 ELECTROCARDIOGRAM COMPLETE: CPT

## 2023-10-03 RX ORDER — TAMSULOSIN HYDROCHLORIDE 0.4 MG/1
0.4 CAPSULE ORAL
Qty: 90 | Refills: 0 | Status: ACTIVE | COMMUNITY
Start: 2023-10-03 | End: 1900-01-01

## 2023-10-06 ENCOUNTER — LABORATORY RESULT (OUTPATIENT)
Age: 63
End: 2023-10-06

## 2023-10-12 ENCOUNTER — LABORATORY RESULT (OUTPATIENT)
Age: 63
End: 2023-10-12

## 2023-10-17 ENCOUNTER — APPOINTMENT (OUTPATIENT)
Dept: INTERNAL MEDICINE | Facility: CLINIC | Age: 63
End: 2023-10-17
Payer: COMMERCIAL

## 2023-10-17 DIAGNOSIS — I82.409 ACUTE EMBOLISM AND THROMBOSIS OF UNSPECIFIED DEEP VEINS OF UNSPECIFIED LOWER EXTREMITY: ICD-10-CM

## 2023-10-17 DIAGNOSIS — N40.0 BENIGN PROSTATIC HYPERPLASIA WITHOUT LOWER URINARY TRACT SYMPMS: ICD-10-CM

## 2023-10-17 PROCEDURE — 99213 OFFICE O/P EST LOW 20 MIN: CPT | Mod: 25

## 2023-10-17 PROCEDURE — G0008: CPT

## 2023-10-17 PROCEDURE — 90682 RIV4 VACC RECOMBINANT DNA IM: CPT

## 2023-10-18 PROCEDURE — 84443 ASSAY THYROID STIM HORMONE: CPT

## 2023-10-18 PROCEDURE — 84466 ASSAY OF TRANSFERRIN: CPT

## 2023-10-18 PROCEDURE — 83036 HEMOGLOBIN GLYCOSYLATED A1C: CPT

## 2023-10-18 PROCEDURE — 96374 THER/PROPH/DIAG INJ IV PUSH: CPT

## 2023-10-18 PROCEDURE — 85730 THROMBOPLASTIN TIME PARTIAL: CPT

## 2023-10-18 PROCEDURE — 85025 COMPLETE CBC W/AUTO DIFF WBC: CPT

## 2023-10-18 PROCEDURE — 83880 ASSAY OF NATRIURETIC PEPTIDE: CPT

## 2023-10-18 PROCEDURE — 83735 ASSAY OF MAGNESIUM: CPT

## 2023-10-18 PROCEDURE — 82728 ASSAY OF FERRITIN: CPT

## 2023-10-18 PROCEDURE — 92960 CARDIOVERSION ELECTRIC EXT: CPT

## 2023-10-18 PROCEDURE — 99285 EMERGENCY DEPT VISIT HI MDM: CPT | Mod: 25

## 2023-10-18 PROCEDURE — 36415 COLL VENOUS BLD VENIPUNCTURE: CPT

## 2023-10-18 PROCEDURE — 84484 ASSAY OF TROPONIN QUANT: CPT

## 2023-10-18 PROCEDURE — 93325 DOPPLER ECHO COLOR FLOW MAPG: CPT

## 2023-10-18 PROCEDURE — 84100 ASSAY OF PHOSPHORUS: CPT

## 2023-10-18 PROCEDURE — 82746 ASSAY OF FOLIC ACID SERUM: CPT

## 2023-10-18 PROCEDURE — 85379 FIBRIN DEGRADATION QUANT: CPT

## 2023-10-18 PROCEDURE — 85610 PROTHROMBIN TIME: CPT

## 2023-10-18 PROCEDURE — 83550 IRON BINDING TEST: CPT

## 2023-10-18 PROCEDURE — 80053 COMPREHEN METABOLIC PANEL: CPT

## 2023-10-18 PROCEDURE — 71045 X-RAY EXAM CHEST 1 VIEW: CPT

## 2023-10-18 PROCEDURE — 80076 HEPATIC FUNCTION PANEL: CPT

## 2023-10-18 PROCEDURE — 80061 LIPID PANEL: CPT

## 2023-10-18 PROCEDURE — 93312 ECHO TRANSESOPHAGEAL: CPT

## 2023-10-18 PROCEDURE — 93320 DOPPLER ECHO COMPLETE: CPT

## 2023-10-18 PROCEDURE — 83540 ASSAY OF IRON: CPT

## 2023-10-18 PROCEDURE — 82607 VITAMIN B-12: CPT

## 2023-10-18 PROCEDURE — 93005 ELECTROCARDIOGRAM TRACING: CPT

## 2023-10-18 PROCEDURE — 93306 TTE W/DOPPLER COMPLETE: CPT

## 2023-10-18 PROCEDURE — 80048 BASIC METABOLIC PNL TOTAL CA: CPT

## 2023-10-18 PROCEDURE — 85027 COMPLETE CBC AUTOMATED: CPT

## 2023-10-20 ENCOUNTER — LABORATORY RESULT (OUTPATIENT)
Age: 63
End: 2023-10-20

## 2023-10-25 ENCOUNTER — APPOINTMENT (OUTPATIENT)
Dept: ENDOCRINOLOGY | Facility: CLINIC | Age: 63
End: 2023-10-25
Payer: COMMERCIAL

## 2023-10-25 VITALS
HEIGHT: 73 IN | HEART RATE: 64 BPM | SYSTOLIC BLOOD PRESSURE: 158 MMHG | OXYGEN SATURATION: 99 % | DIASTOLIC BLOOD PRESSURE: 90 MMHG | WEIGHT: 315 LBS | BODY MASS INDEX: 41.75 KG/M2

## 2023-10-25 PROCEDURE — 99213 OFFICE O/P EST LOW 20 MIN: CPT

## 2023-10-27 ENCOUNTER — LABORATORY RESULT (OUTPATIENT)
Age: 63
End: 2023-10-27

## 2023-11-02 ENCOUNTER — LABORATORY RESULT (OUTPATIENT)
Age: 63
End: 2023-11-02

## 2023-11-10 ENCOUNTER — LABORATORY RESULT (OUTPATIENT)
Age: 63
End: 2023-11-10

## 2023-11-17 ENCOUNTER — LABORATORY RESULT (OUTPATIENT)
Age: 63
End: 2023-11-17

## 2023-11-24 ENCOUNTER — LABORATORY RESULT (OUTPATIENT)
Age: 63
End: 2023-11-24

## 2023-11-27 RX ORDER — DILTIAZEM HYDROCHLORIDE 240 MG/1
240 CAPSULE, EXTENDED RELEASE ORAL
Qty: 90 | Refills: 0 | Status: ACTIVE | COMMUNITY
Start: 2023-08-25 | End: 1900-01-01

## 2023-12-04 ENCOUNTER — RX RENEWAL (OUTPATIENT)
Age: 63
End: 2023-12-04

## 2023-12-08 ENCOUNTER — LABORATORY RESULT (OUTPATIENT)
Age: 63
End: 2023-12-08

## 2023-12-15 ENCOUNTER — LABORATORY RESULT (OUTPATIENT)
Age: 63
End: 2023-12-15

## 2023-12-18 ENCOUNTER — LABORATORY RESULT (OUTPATIENT)
Age: 63
End: 2023-12-18

## 2023-12-28 ENCOUNTER — APPOINTMENT (OUTPATIENT)
Dept: INTERNAL MEDICINE | Facility: CLINIC | Age: 63
End: 2023-12-28

## 2023-12-29 ENCOUNTER — LABORATORY RESULT (OUTPATIENT)
Age: 63
End: 2023-12-29

## 2024-01-05 ENCOUNTER — LABORATORY RESULT (OUTPATIENT)
Age: 64
End: 2024-01-05

## 2024-01-12 ENCOUNTER — APPOINTMENT (OUTPATIENT)
Dept: ENDOCRINOLOGY | Facility: CLINIC | Age: 64
End: 2024-01-12
Payer: COMMERCIAL

## 2024-01-12 ENCOUNTER — LABORATORY RESULT (OUTPATIENT)
Age: 64
End: 2024-01-12

## 2024-01-12 VITALS
WEIGHT: 315 LBS | RESPIRATION RATE: 18 BRPM | HEIGHT: 73 IN | HEART RATE: 65 BPM | DIASTOLIC BLOOD PRESSURE: 83 MMHG | SYSTOLIC BLOOD PRESSURE: 156 MMHG | OXYGEN SATURATION: 97 % | BODY MASS INDEX: 41.75 KG/M2

## 2024-01-12 PROCEDURE — 99213 OFFICE O/P EST LOW 20 MIN: CPT

## 2024-01-12 RX ORDER — SEMAGLUTIDE 0.25 MG/.5ML
0.25 INJECTION, SOLUTION SUBCUTANEOUS
Qty: 1 | Refills: 0 | Status: DISCONTINUED | COMMUNITY
Start: 2023-10-25 | End: 2024-01-12

## 2024-01-12 NOTE — ASSESSMENT
[Weight Loss] : weight loss [FreeTextEntry1] : 63 year old male with PMH of BMI 42, gastric bypass ~2005, new onset aflutter, acute DVT and PE (2019 on warfarin) is presenting for follow up of weight management.  1. BMI 43 --> 44  -Discussed during previous encounter the risks of obesity including but not limited to contributing to the development of hypertension, diabetes, hyperlipidemia, circulation problems and their subsequent complications. The patient was encouraged to decrease their caloric intake and increase their exercise. Discussed portion control and good meal choices. Proper exercise (not only cardio) but exercising with weights and raising heart rate to 80% (safely) for a total of 150mins/week or 75 mins/week of vigorous exercise is recommended. Also would try and cut 500 calories daily from his diet. Need to cut 3,500 calories to burn 1 pound of fat. -We discussed medications for weight loss, their mechanisms, precautions and side effects. GLP-1 agonists have possible side effects of nausea, diarrhea, early satiety. Patient interested in starting Wegovy. -Resume Wegovy 0.55mg weekly for 3 months, if weight loss plateaued then increase to 1.0mg weekly. Pt to inform me.   2. Elevated salivary cortisol -Repeat salivary cortisol wnl -24 UFC wnl  Patient verbalized understanding of the above. All questions were answered to patient's satisfaction. Dispo: Patient will follow up in 6 months.

## 2024-01-12 NOTE — HISTORY OF PRESENT ILLNESS
[FreeTextEntry1] : Mr. Naranjo is presenting for follow up of weight management.  Pt is retired RN.   63 year old male with PMH of BMI 44, amiodarone and diltiazem ER 240mg QD for the aflutter, gastric bypass ~2005, acute DVT and PE (2019 on warfarin) and recent aflutter.  No h/o stroke, MI.    #BMI 42  Gastric bypass ~2005. Weight 512 at heaviest, now weighs 326lbs.  Parents both normal body mass.  Always struggled with weight issues, went to weight loss camps during childhood.  Pt is not interested in revision.  24 hour diet recall: Pt had egg, sausage and bagel. Chicken. Eats 2 meals per day with snacks. Minimal take out. Sweets minimal. No soda or juice. Chicken or fish mostly.  Exercise: Has gym membership, walks on treadmill 30 minutes about 3x/week. Just restarted this regimen about 6 weeks ago. Prior to that no exercise.  Has not tried weight loss medication medications in the past.   Interval hx:  He has been unable to obtain GLP-1 for weight loss due to shortages.  He has gained ~10 pounds since previous visit.

## 2024-01-12 NOTE — PHYSICAL EXAM
[Alert] : alert [Well Nourished] : well nourished [No Acute Distress] : no acute distress [No Neck Mass] : no neck mass was observed [No Respiratory Distress] : no respiratory distress [Normal Rate] : heart rate was normal [Not Tender] : non-tender [No CVA Tenderness] : no ~M costovertebral angle tenderness [No Stigmata of Cushings Syndrome] : no stigmata of Cushings Syndrome [Normal Gait] : normal gait [No Rash] : no rash [Oriented x3] : oriented to person, place, and time [Normal Affect] : the affect was normal [Normal Mood] : the mood was normal [de-identified] : BMI 42  [de-identified] : +enlarged abdomen.

## 2024-01-19 ENCOUNTER — LABORATORY RESULT (OUTPATIENT)
Age: 64
End: 2024-01-19

## 2024-01-25 RX ORDER — SEMAGLUTIDE 0.5 MG/.5ML
0.5 INJECTION, SOLUTION SUBCUTANEOUS
Qty: 3 | Refills: 1 | Status: DISCONTINUED | COMMUNITY
Start: 2023-10-25 | End: 2024-01-25

## 2024-01-26 ENCOUNTER — LABORATORY RESULT (OUTPATIENT)
Age: 64
End: 2024-01-26

## 2024-02-02 ENCOUNTER — LABORATORY RESULT (OUTPATIENT)
Age: 64
End: 2024-02-02

## 2024-02-09 ENCOUNTER — LABORATORY RESULT (OUTPATIENT)
Age: 64
End: 2024-02-09

## 2024-02-16 ENCOUNTER — LABORATORY RESULT (OUTPATIENT)
Age: 64
End: 2024-02-16

## 2024-02-23 ENCOUNTER — LABORATORY RESULT (OUTPATIENT)
Age: 64
End: 2024-02-23

## 2024-03-01 ENCOUNTER — LABORATORY RESULT (OUTPATIENT)
Age: 64
End: 2024-03-01

## 2024-03-08 ENCOUNTER — LABORATORY RESULT (OUTPATIENT)
Age: 64
End: 2024-03-08

## 2024-03-19 ENCOUNTER — LABORATORY RESULT (OUTPATIENT)
Age: 64
End: 2024-03-19

## 2024-03-22 ENCOUNTER — LABORATORY RESULT (OUTPATIENT)
Age: 64
End: 2024-03-22

## 2024-03-29 ENCOUNTER — LABORATORY RESULT (OUTPATIENT)
Age: 64
End: 2024-03-29

## 2024-04-05 ENCOUNTER — LABORATORY RESULT (OUTPATIENT)
Age: 64
End: 2024-04-05

## 2024-04-12 ENCOUNTER — LABORATORY RESULT (OUTPATIENT)
Age: 64
End: 2024-04-12

## 2024-04-19 ENCOUNTER — LABORATORY RESULT (OUTPATIENT)
Age: 64
End: 2024-04-19

## 2024-04-26 ENCOUNTER — LABORATORY RESULT (OUTPATIENT)
Age: 64
End: 2024-04-26

## 2024-05-03 ENCOUNTER — LABORATORY RESULT (OUTPATIENT)
Age: 64
End: 2024-05-03

## 2024-05-10 ENCOUNTER — LABORATORY RESULT (OUTPATIENT)
Age: 64
End: 2024-05-10

## 2024-05-17 ENCOUNTER — LABORATORY RESULT (OUTPATIENT)
Age: 64
End: 2024-05-17

## 2024-05-20 RX ORDER — WARFARIN 6 MG/1
6 TABLET ORAL DAILY
Qty: 90 | Refills: 0 | Status: ACTIVE | COMMUNITY
Start: 2024-05-20 | End: 1900-01-01

## 2024-05-21 ENCOUNTER — APPOINTMENT (OUTPATIENT)
Dept: INTERNAL MEDICINE | Facility: CLINIC | Age: 64
End: 2024-05-21
Payer: COMMERCIAL

## 2024-05-21 ENCOUNTER — NON-APPOINTMENT (OUTPATIENT)
Age: 64
End: 2024-05-21

## 2024-05-21 VITALS
TEMPERATURE: 98.1 F | BODY MASS INDEX: 41.75 KG/M2 | HEIGHT: 73 IN | DIASTOLIC BLOOD PRESSURE: 80 MMHG | WEIGHT: 315 LBS | OXYGEN SATURATION: 95 % | SYSTOLIC BLOOD PRESSURE: 158 MMHG | HEART RATE: 67 BPM

## 2024-05-21 DIAGNOSIS — Z86.39 PERSONAL HISTORY OF OTHER ENDOCRINE, NUTRITIONAL AND METABOLIC DISEASE: ICD-10-CM

## 2024-05-21 DIAGNOSIS — R09.89 OTHER SPECIFIED SYMPTOMS AND SIGNS INVOLVING THE CIRCULATORY AND RESPIRATORY SYSTEMS: ICD-10-CM

## 2024-05-21 PROCEDURE — 99213 OFFICE O/P EST LOW 20 MIN: CPT

## 2024-05-21 RX ORDER — WARFARIN 7.5 MG/1
7.5 TABLET ORAL
Refills: 0 | Status: DISCONTINUED | COMMUNITY
Start: 2023-08-25 | End: 2024-05-21

## 2024-05-21 RX ORDER — AMIODARONE HYDROCHLORIDE 200 MG/1
200 TABLET ORAL DAILY
Qty: 90 | Refills: 0 | Status: DISCONTINUED | COMMUNITY
Start: 2023-09-08 | End: 2024-05-21

## 2024-05-21 RX ORDER — LIRAGLUTIDE 6 MG/ML
18 INJECTION, SOLUTION SUBCUTANEOUS
Qty: 1 | Refills: 3 | Status: DISCONTINUED | COMMUNITY
Start: 2023-09-13 | End: 2024-05-21

## 2024-05-21 RX ORDER — TIRZEPATIDE 7.5 MG/.5ML
7.5 INJECTION, SOLUTION SUBCUTANEOUS
Refills: 0 | Status: ACTIVE | COMMUNITY

## 2024-05-21 RX ORDER — WARFARIN 5 MG/1
5 TABLET ORAL
Qty: 90 | Refills: 1 | Status: DISCONTINUED | COMMUNITY
Start: 2023-05-18 | End: 2024-05-21

## 2024-05-21 NOTE — END OF VISIT
[FreeTextEntry3] :  "I, Braxton Collins, personally scribed the services dictated to me by Dr. Nitin Baig MD in this documentation on 05/21/2024 "   "I Dr. Nitin Baig MD, personally performed the services described in this documentation on 05/21/2024 for the patient as scribed by Braxton Collins in my presence. I have reviewed and verified that all the information is accurate and true."

## 2024-05-21 NOTE — REVIEW OF SYSTEMS
[Negative] : Heme/Lymph [Cough] : cough [Chest Pain] : no chest pain [Palpitations] : no palpitations [Shortness Of Breath] : no shortness of breath [FreeTextEntry6] : chest congestion

## 2024-05-21 NOTE — PHYSICAL EXAM
[No Acute Distress] : no acute distress [Well Nourished] : well nourished [Well Developed] : well developed [Well-Appearing] : well-appearing [Normal Voice/Communication] : normal voice/communication [Normal Sclera/Conjunctiva] : normal sclera/conjunctiva [PERRL] : pupils equal round and reactive to light [EOMI] : extraocular movements intact [Normal Outer Ear/Nose] : the outer ears and nose were normal in appearance [Normal Oropharynx] : the oropharynx was normal [Normal TMs] : both tympanic membranes were normal [No JVD] : no jugular venous distention [No Lymphadenopathy] : no lymphadenopathy [Supple] : supple [Thyroid Normal, No Nodules] : the thyroid was normal and there were no nodules present [No Respiratory Distress] : no respiratory distress  [No Accessory Muscle Use] : no accessory muscle use [Normal Rate] : normal rate  [Regular Rhythm] : with a regular rhythm [Normal S1, S2] : normal S1 and S2 [No Murmur] : no murmur heard [No Carotid Bruits] : no carotid bruits [No Abdominal Bruit] : a ~M bruit was not heard ~T in the abdomen [No Varicosities] : no varicosities [Pedal Pulses Present] : the pedal pulses are present [No Edema] : there was no peripheral edema [No Palpable Aorta] : no palpable aorta [No Extremity Clubbing/Cyanosis] : no extremity clubbing/cyanosis [Soft] : abdomen soft [Non Tender] : non-tender [Non-distended] : non-distended [No Masses] : no abdominal mass palpated [No HSM] : no HSM [Normal Bowel Sounds] : normal bowel sounds [Normal Supraclavicular Nodes] : no supraclavicular lymphadenopathy [Normal Posterior Cervical Nodes] : no posterior cervical lymphadenopathy [Normal Anterior Cervical Nodes] : no anterior cervical lymphadenopathy [No CVA Tenderness] : no CVA  tenderness [No Spinal Tenderness] : no spinal tenderness [No Joint Swelling] : no joint swelling [Grossly Normal Strength/Tone] : grossly normal strength/tone [No Rash] : no rash [Coordination Grossly Intact] : coordination grossly intact [No Focal Deficits] : no focal deficits [Normal Gait] : normal gait [Deep Tendon Reflexes (DTR)] : deep tendon reflexes were 2+ and symmetric [Speech Grossly Normal] : speech grossly normal [Memory Grossly Normal] : memory grossly normal [Normal Affect] : the affect was normal [Alert and Oriented x3] : oriented to person, place, and time [Normal Mood] : the mood was normal [Normal Insight/Judgement] : insight and judgment were intact [Rales / Crackles Bilateral Midlung Fields] : crackles were heard over both midlung fields [Rales / Crackles Bilateral Bases] : crackles were heard over both bases [de-identified] : Obese

## 2024-05-21 NOTE — HISTORY OF PRESENT ILLNESS
[FreeTextEntry8] : NAVYA VINES is a 63 year M who presents today for an acute visit for chest congestion that began 2 weeks ago. Patient denies experiencing any palpitations. Patient also complains of a productive cough with white phlegm. Patient denies experiencing any SOB or chest pain. Patient is currently taking Mucinex with mild relief in symptoms. Patient has been trying to lose weight recently and he is currently taking Zepbound for weight loss. Patient has a hx of DVT, atrial flutter, coagulopathy, and PE.

## 2024-05-24 ENCOUNTER — LABORATORY RESULT (OUTPATIENT)
Age: 64
End: 2024-05-24

## 2024-05-31 ENCOUNTER — LABORATORY RESULT (OUTPATIENT)
Age: 64
End: 2024-05-31

## 2024-06-07 ENCOUNTER — LABORATORY RESULT (OUTPATIENT)
Age: 64
End: 2024-06-07

## 2024-06-19 ENCOUNTER — LABORATORY RESULT (OUTPATIENT)
Age: 64
End: 2024-06-19

## 2024-06-21 ENCOUNTER — LABORATORY RESULT (OUTPATIENT)
Age: 64
End: 2024-06-21

## 2024-06-25 ENCOUNTER — APPOINTMENT (OUTPATIENT)
Dept: INTERNAL MEDICINE | Facility: CLINIC | Age: 64
End: 2024-06-25
Payer: COMMERCIAL

## 2024-06-25 ENCOUNTER — RX RENEWAL (OUTPATIENT)
Age: 64
End: 2024-06-25

## 2024-06-25 VITALS
HEIGHT: 73 IN | SYSTOLIC BLOOD PRESSURE: 127 MMHG | RESPIRATION RATE: 18 BRPM | HEART RATE: 70 BPM | OXYGEN SATURATION: 96 % | WEIGHT: 315 LBS | DIASTOLIC BLOOD PRESSURE: 75 MMHG | BODY MASS INDEX: 41.75 KG/M2 | TEMPERATURE: 97.8 F

## 2024-06-25 DIAGNOSIS — I10 ESSENTIAL (PRIMARY) HYPERTENSION: ICD-10-CM

## 2024-06-25 DIAGNOSIS — D68.9 COAGULATION DEFECT, UNSPECIFIED: ICD-10-CM

## 2024-06-25 DIAGNOSIS — E66.01 MORBID (SEVERE) OBESITY DUE TO EXCESS CALORIES: ICD-10-CM

## 2024-06-25 PROCEDURE — 99214 OFFICE O/P EST MOD 30 MIN: CPT

## 2024-06-25 RX ORDER — BUPROPION HYDROCHLORIDE 100 MG/1
TABLET, FILM COATED ORAL
Refills: 0 | Status: ACTIVE | COMMUNITY

## 2024-06-25 RX ORDER — NALTREXONE HYDROCHLORIDE 50 MG/1
50 TABLET, FILM COATED ORAL
Refills: 0 | Status: ACTIVE | COMMUNITY

## 2024-06-25 RX ORDER — DILTIAZEM HYDROCHLORIDE 240 MG/1
240 CAPSULE, EXTENDED RELEASE ORAL
Qty: 90 | Refills: 0 | Status: ACTIVE | COMMUNITY
Start: 2023-12-04 | End: 1900-01-01

## 2024-06-25 RX ORDER — LEVOFLOXACIN 500 MG/1
500 TABLET, FILM COATED ORAL DAILY
Qty: 10 | Refills: 0 | Status: DISCONTINUED | COMMUNITY
Start: 2024-05-21 | End: 2024-06-25

## 2024-06-25 RX ORDER — SILODOSIN 8 MG/1
8 CAPSULE ORAL
Refills: 0 | Status: ACTIVE | COMMUNITY

## 2024-06-25 RX ORDER — VALSARTAN 40 MG/1
40 TABLET, COATED ORAL
Qty: 90 | Refills: 2 | Status: ACTIVE | COMMUNITY
Start: 2024-06-25 | End: 1900-01-01

## 2024-06-28 ENCOUNTER — LABORATORY RESULT (OUTPATIENT)
Age: 64
End: 2024-06-28

## 2024-07-05 ENCOUNTER — LABORATORY RESULT (OUTPATIENT)
Age: 64
End: 2024-07-05

## 2024-07-12 ENCOUNTER — LABORATORY RESULT (OUTPATIENT)
Age: 64
End: 2024-07-12

## 2024-07-15 ENCOUNTER — APPOINTMENT (OUTPATIENT)
Dept: CARDIOLOGY | Facility: CLINIC | Age: 64
End: 2024-07-15
Payer: COMMERCIAL

## 2024-07-15 ENCOUNTER — NON-APPOINTMENT (OUTPATIENT)
Age: 64
End: 2024-07-15

## 2024-07-15 VITALS
SYSTOLIC BLOOD PRESSURE: 130 MMHG | RESPIRATION RATE: 18 BRPM | HEART RATE: 62 BPM | DIASTOLIC BLOOD PRESSURE: 72 MMHG | WEIGHT: 315 LBS | TEMPERATURE: 97.8 F | BODY MASS INDEX: 41.75 KG/M2 | HEIGHT: 73 IN | OXYGEN SATURATION: 96 %

## 2024-07-15 DIAGNOSIS — I48.92 UNSPECIFIED ATRIAL FLUTTER: ICD-10-CM

## 2024-07-15 DIAGNOSIS — I82.409 ACUTE EMBOLISM AND THROMBOSIS OF UNSPECIFIED DEEP VEINS OF UNSPECIFIED LOWER EXTREMITY: ICD-10-CM

## 2024-07-15 DIAGNOSIS — I26.99 OTHER PULMONARY EMBOLISM W/OUT ACUTE COR PULMONALE: ICD-10-CM

## 2024-07-15 DIAGNOSIS — N40.0 BENIGN PROSTATIC HYPERPLASIA WITHOUT LOWER URINARY TRACT SYMPMS: ICD-10-CM

## 2024-07-15 DIAGNOSIS — I10 ESSENTIAL (PRIMARY) HYPERTENSION: ICD-10-CM

## 2024-07-15 DIAGNOSIS — R06.02 SHORTNESS OF BREATH: ICD-10-CM

## 2024-07-15 PROCEDURE — 99213 OFFICE O/P EST LOW 20 MIN: CPT | Mod: 25

## 2024-07-15 PROCEDURE — 93000 ELECTROCARDIOGRAM COMPLETE: CPT

## 2024-07-19 ENCOUNTER — LABORATORY RESULT (OUTPATIENT)
Age: 64
End: 2024-07-19

## 2024-07-26 ENCOUNTER — LABORATORY RESULT (OUTPATIENT)
Age: 64
End: 2024-07-26

## 2024-07-30 NOTE — CARE COORDINATION ASSESSMENT. - OTHER PERTINENT DISCHARGE PLANNING INFORMATION:
No care due was identified.  [facility] Embedded Care Due Messages. Reference number: [id number].   7/30/2024 6:03:44 AM CDT   Discussed today at rounds. PAtient is alert and oriented. States he resides in private home with spouse and adult sons. States he walks independently at home. 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 States his family is all nurses. He cares for himself and declined to identify a caregiver. States he has not had [facility] in the past.  Educated him re. role of social work and case management and provided d/c planning folder including our phone #s. No discharge needs identified at this time. He was encouraged to call as needed.

## 2024-08-08 ENCOUNTER — RX RENEWAL (OUTPATIENT)
Age: 64
End: 2024-08-08

## 2024-08-26 ENCOUNTER — LABORATORY RESULT (OUTPATIENT)
Age: 64
End: 2024-08-26

## 2024-08-30 ENCOUNTER — LABORATORY RESULT (OUTPATIENT)
Age: 64
End: 2024-08-30

## 2024-09-06 ENCOUNTER — LABORATORY RESULT (OUTPATIENT)
Age: 64
End: 2024-09-06

## 2024-09-09 ENCOUNTER — LABORATORY RESULT (OUTPATIENT)
Age: 64
End: 2024-09-09

## 2024-09-13 ENCOUNTER — LABORATORY RESULT (OUTPATIENT)
Age: 64
End: 2024-09-13

## 2024-09-17 ENCOUNTER — APPOINTMENT (OUTPATIENT)
Dept: INTERNAL MEDICINE | Facility: CLINIC | Age: 64
End: 2024-09-17
Payer: COMMERCIAL

## 2024-09-17 VITALS
SYSTOLIC BLOOD PRESSURE: 123 MMHG | HEIGHT: 73 IN | BODY MASS INDEX: 41.75 KG/M2 | DIASTOLIC BLOOD PRESSURE: 80 MMHG | WEIGHT: 315 LBS | OXYGEN SATURATION: 98 % | TEMPERATURE: 98.4 F | HEART RATE: 79 BPM

## 2024-09-17 DIAGNOSIS — I10 ESSENTIAL (PRIMARY) HYPERTENSION: ICD-10-CM

## 2024-09-17 DIAGNOSIS — D68.9 COAGULATION DEFECT, UNSPECIFIED: ICD-10-CM

## 2024-09-17 DIAGNOSIS — N40.0 BENIGN PROSTATIC HYPERPLASIA WITHOUT LOWER URINARY TRACT SYMPMS: ICD-10-CM

## 2024-09-17 PROCEDURE — 99213 OFFICE O/P EST LOW 20 MIN: CPT

## 2024-09-20 ENCOUNTER — LABORATORY RESULT (OUTPATIENT)
Age: 64
End: 2024-09-20

## 2024-09-20 NOTE — HISTORY OF PRESENT ILLNESS
[FreeTextEntry1] : follow up [de-identified] : NAVYA VINES is a 63-year-old M who presents today for a follow up visit. Patient states that he feels like he constantly has to urinate but when he tries to relieve himself the urine does not come out normally. Patient describes it as "dripping" out. Patient has a hx of DVT, BPH, and HTN.

## 2024-09-20 NOTE — PLAN
[FreeTextEntry1] : Patient has urinary frequency.  Follow up with Urologist. Patient has a hx of BPH.  INR levels have been therapeutic abnormal.

## 2024-09-20 NOTE — HISTORY OF PRESENT ILLNESS
[FreeTextEntry1] : follow up [de-identified] : NAVYA VINES is a 63-year-old M who presents today for a follow up visit. Patient states that he feels like he constantly has to urinate but when he tries to relieve himself the urine does not come out normally. Patient describes it as "dripping" out. Patient has a hx of DVT, BPH, and HTN.

## 2024-09-20 NOTE — END OF VISIT
[FreeTextEntry3] :  "I, Eddie Alan, personally scribed the services dictated to me by Dr. Han Baig MD in this documentation on 09/17/2024" "I Dr. Han Baig MD, personally performed the services described in this documentation on 09/17/2024 for the patient as scribed by Eddie Alan in my presence. I have reviewed and verified that all the information is accurate and true."

## 2024-09-27 ENCOUNTER — LABORATORY RESULT (OUTPATIENT)
Age: 64
End: 2024-09-27

## 2024-10-04 ENCOUNTER — LABORATORY RESULT (OUTPATIENT)
Age: 64
End: 2024-10-04

## 2024-10-11 ENCOUNTER — LABORATORY RESULT (OUTPATIENT)
Age: 64
End: 2024-10-11

## 2024-10-18 ENCOUNTER — LABORATORY RESULT (OUTPATIENT)
Age: 64
End: 2024-10-18

## 2024-10-24 ENCOUNTER — LABORATORY RESULT (OUTPATIENT)
Age: 64
End: 2024-10-24

## 2024-11-01 ENCOUNTER — LABORATORY RESULT (OUTPATIENT)
Age: 64
End: 2024-11-01

## 2024-11-04 ENCOUNTER — LABORATORY RESULT (OUTPATIENT)
Age: 64
End: 2024-11-04

## 2024-11-05 ENCOUNTER — NON-APPOINTMENT (OUTPATIENT)
Age: 64
End: 2024-11-05

## 2024-11-08 ENCOUNTER — LABORATORY RESULT (OUTPATIENT)
Age: 64
End: 2024-11-08

## 2024-11-08 ENCOUNTER — NON-APPOINTMENT (OUTPATIENT)
Age: 64
End: 2024-11-08

## 2024-11-09 ENCOUNTER — LABORATORY RESULT (OUTPATIENT)
Age: 64
End: 2024-11-09

## 2024-11-11 ENCOUNTER — RX RENEWAL (OUTPATIENT)
Age: 64
End: 2024-11-11

## 2024-11-14 ENCOUNTER — APPOINTMENT (OUTPATIENT)
Dept: CARDIOLOGY | Facility: CLINIC | Age: 64
End: 2024-11-14

## 2024-11-15 ENCOUNTER — LABORATORY RESULT (OUTPATIENT)
Age: 64
End: 2024-11-15

## 2024-11-22 ENCOUNTER — LABORATORY RESULT (OUTPATIENT)
Age: 64
End: 2024-11-22

## 2024-11-29 ENCOUNTER — LABORATORY RESULT (OUTPATIENT)
Age: 64
End: 2024-11-29

## 2024-12-03 ENCOUNTER — APPOINTMENT (OUTPATIENT)
Dept: CARDIOLOGY | Facility: CLINIC | Age: 64
End: 2024-12-03
Payer: COMMERCIAL

## 2024-12-03 ENCOUNTER — NON-APPOINTMENT (OUTPATIENT)
Age: 64
End: 2024-12-03

## 2024-12-03 VITALS
SYSTOLIC BLOOD PRESSURE: 118 MMHG | WEIGHT: 315 LBS | BODY MASS INDEX: 41.75 KG/M2 | OXYGEN SATURATION: 97 % | HEIGHT: 73 IN | DIASTOLIC BLOOD PRESSURE: 78 MMHG | HEART RATE: 71 BPM | TEMPERATURE: 97.4 F

## 2024-12-03 DIAGNOSIS — E78.5 HYPERLIPIDEMIA, UNSPECIFIED: ICD-10-CM

## 2024-12-03 PROCEDURE — 93000 ELECTROCARDIOGRAM COMPLETE: CPT

## 2024-12-03 PROCEDURE — 36415 COLL VENOUS BLD VENIPUNCTURE: CPT

## 2024-12-03 PROCEDURE — 99396 PREV VISIT EST AGE 40-64: CPT

## 2024-12-03 RX ORDER — APIXABAN 5 MG/1
5 TABLET, FILM COATED ORAL
Qty: 180 | Refills: 0 | Status: ACTIVE | COMMUNITY
Start: 2024-12-03 | End: 1900-01-01

## 2024-12-04 LAB
ALBUMIN SERPL ELPH-MCNC: 4.2 G/DL
ALP BLD-CCNC: 108 U/L
ALT SERPL-CCNC: 11 U/L
ANION GAP SERPL CALC-SCNC: 14 MMOL/L
AST SERPL-CCNC: 19 U/L
BILIRUB SERPL-MCNC: 0.8 MG/DL
BUN SERPL-MCNC: 15 MG/DL
CALCIUM SERPL-MCNC: 9 MG/DL
CHLORIDE SERPL-SCNC: 102 MMOL/L
CHOLEST SERPL-MCNC: 200 MG/DL
CO2 SERPL-SCNC: 25 MMOL/L
CREAT SERPL-MCNC: 0.98 MG/DL
EGFR: 87 ML/MIN/1.73M2
GLUCOSE SERPL-MCNC: 86 MG/DL
HCT VFR BLD CALC: 42 %
HDLC SERPL-MCNC: 92 MG/DL
HGB BLD-MCNC: 13.5 G/DL
INR PPP: 3.55 RATIO
LDLC SERPL CALC-MCNC: 99 MG/DL
MCHC RBC-ENTMCNC: 29.7 PG
MCHC RBC-ENTMCNC: 32.1 G/DL
MCV RBC AUTO: 92.3 FL
NONHDLC SERPL-MCNC: 108 MG/DL
PLATELET # BLD AUTO: 208 K/UL
POTASSIUM SERPL-SCNC: 4 MMOL/L
PROT SERPL-MCNC: 7.2 G/DL
PSA SERPL-MCNC: 2.71 NG/ML
PT BLD: 41.7 SEC
RBC # BLD: 4.55 M/UL
RBC # FLD: 16 %
SODIUM SERPL-SCNC: 141 MMOL/L
T4 SERPL-MCNC: 5.5 UG/DL
TRIGL SERPL-MCNC: 47 MG/DL
TSH SERPL-ACNC: 23.4 UIU/ML
WBC # FLD AUTO: 9.29 K/UL

## 2024-12-05 ENCOUNTER — LABORATORY RESULT (OUTPATIENT)
Age: 64
End: 2024-12-05

## 2024-12-13 ENCOUNTER — LABORATORY RESULT (OUTPATIENT)
Age: 64
End: 2024-12-13

## 2024-12-16 ENCOUNTER — LABORATORY RESULT (OUTPATIENT)
Age: 64
End: 2024-12-16

## 2024-12-20 ENCOUNTER — LABORATORY RESULT (OUTPATIENT)
Age: 64
End: 2024-12-20

## 2024-12-23 ENCOUNTER — RX RENEWAL (OUTPATIENT)
Age: 64
End: 2024-12-23

## 2024-12-31 ENCOUNTER — RX RENEWAL (OUTPATIENT)
Age: 64
End: 2024-12-31

## 2025-01-27 ENCOUNTER — RX RENEWAL (OUTPATIENT)
Age: 65
End: 2025-01-27

## 2025-02-03 ENCOUNTER — APPOINTMENT (OUTPATIENT)
Dept: CARDIOLOGY | Facility: CLINIC | Age: 65
End: 2025-02-03

## 2025-02-14 ENCOUNTER — NON-APPOINTMENT (OUTPATIENT)
Age: 65
End: 2025-02-14

## 2025-02-14 ENCOUNTER — APPOINTMENT (OUTPATIENT)
Dept: CARDIOLOGY | Facility: CLINIC | Age: 65
End: 2025-02-14
Payer: COMMERCIAL

## 2025-02-14 VITALS
HEIGHT: 73 IN | WEIGHT: 300 LBS | TEMPERATURE: 97.1 F | DIASTOLIC BLOOD PRESSURE: 85 MMHG | SYSTOLIC BLOOD PRESSURE: 140 MMHG | OXYGEN SATURATION: 96 % | BODY MASS INDEX: 39.76 KG/M2 | HEART RATE: 67 BPM

## 2025-02-14 DIAGNOSIS — E78.5 HYPERLIPIDEMIA, UNSPECIFIED: ICD-10-CM

## 2025-02-14 DIAGNOSIS — I26.99 OTHER PULMONARY EMBOLISM W/OUT ACUTE COR PULMONALE: ICD-10-CM

## 2025-02-14 DIAGNOSIS — I48.92 UNSPECIFIED ATRIAL FLUTTER: ICD-10-CM

## 2025-02-14 DIAGNOSIS — I82.409 ACUTE EMBOLISM AND THROMBOSIS OF UNSPECIFIED DEEP VEINS OF UNSPECIFIED LOWER EXTREMITY: ICD-10-CM

## 2025-02-14 DIAGNOSIS — I10 ESSENTIAL (PRIMARY) HYPERTENSION: ICD-10-CM

## 2025-02-14 DIAGNOSIS — Z86.39 PERSONAL HISTORY OF OTHER ENDOCRINE, NUTRITIONAL AND METABOLIC DISEASE: ICD-10-CM

## 2025-02-14 PROCEDURE — 93000 ELECTROCARDIOGRAM COMPLETE: CPT

## 2025-02-14 PROCEDURE — 99213 OFFICE O/P EST LOW 20 MIN: CPT | Mod: 25

## 2025-02-20 ENCOUNTER — RX RENEWAL (OUTPATIENT)
Age: 65
End: 2025-02-20

## 2025-03-07 ENCOUNTER — APPOINTMENT (OUTPATIENT)
Dept: CARDIOLOGY | Facility: CLINIC | Age: 65
End: 2025-03-07
Payer: COMMERCIAL

## 2025-03-07 ENCOUNTER — NON-APPOINTMENT (OUTPATIENT)
Age: 65
End: 2025-03-07

## 2025-03-07 VITALS
WEIGHT: 285 LBS | BODY MASS INDEX: 37.77 KG/M2 | OXYGEN SATURATION: 99 % | HEART RATE: 62 BPM | HEIGHT: 73 IN | TEMPERATURE: 97.3 F | SYSTOLIC BLOOD PRESSURE: 130 MMHG | DIASTOLIC BLOOD PRESSURE: 75 MMHG

## 2025-03-07 DIAGNOSIS — I82.409 ACUTE EMBOLISM AND THROMBOSIS OF UNSPECIFIED DEEP VEINS OF UNSPECIFIED LOWER EXTREMITY: ICD-10-CM

## 2025-03-07 DIAGNOSIS — Z86.39 PERSONAL HISTORY OF OTHER ENDOCRINE, NUTRITIONAL AND METABOLIC DISEASE: ICD-10-CM

## 2025-03-07 DIAGNOSIS — I10 ESSENTIAL (PRIMARY) HYPERTENSION: ICD-10-CM

## 2025-03-07 DIAGNOSIS — E78.5 HYPERLIPIDEMIA, UNSPECIFIED: ICD-10-CM

## 2025-03-07 DIAGNOSIS — I48.92 UNSPECIFIED ATRIAL FLUTTER: ICD-10-CM

## 2025-03-07 DIAGNOSIS — I26.99 OTHER PULMONARY EMBOLISM W/OUT ACUTE COR PULMONALE: ICD-10-CM

## 2025-03-07 PROCEDURE — 99214 OFFICE O/P EST MOD 30 MIN: CPT | Mod: 25

## 2025-03-07 PROCEDURE — 93000 ELECTROCARDIOGRAM COMPLETE: CPT

## 2025-03-19 RX ORDER — SOLIFENACIN SUCCINATE 10 MG/1
10 TABLET ORAL
Qty: 90 | Refills: 0 | Status: ACTIVE | COMMUNITY
Start: 2025-03-19 | End: 1900-01-01

## 2025-04-24 ENCOUNTER — NON-APPOINTMENT (OUTPATIENT)
Age: 65
End: 2025-04-24

## 2025-04-24 ENCOUNTER — APPOINTMENT (OUTPATIENT)
Dept: CARDIOLOGY | Facility: CLINIC | Age: 65
End: 2025-04-24
Payer: COMMERCIAL

## 2025-04-24 VITALS
OXYGEN SATURATION: 97 % | SYSTOLIC BLOOD PRESSURE: 125 MMHG | DIASTOLIC BLOOD PRESSURE: 76 MMHG | HEART RATE: 68 BPM | WEIGHT: 285 LBS | BODY MASS INDEX: 37.77 KG/M2 | HEIGHT: 73 IN | TEMPERATURE: 97.7 F

## 2025-04-24 DIAGNOSIS — E66.01 MORBID (SEVERE) OBESITY DUE TO EXCESS CALORIES: ICD-10-CM

## 2025-04-24 DIAGNOSIS — I26.99 OTHER PULMONARY EMBOLISM W/OUT ACUTE COR PULMONALE: ICD-10-CM

## 2025-04-24 DIAGNOSIS — I10 ESSENTIAL (PRIMARY) HYPERTENSION: ICD-10-CM

## 2025-04-24 DIAGNOSIS — I82.409 ACUTE EMBOLISM AND THROMBOSIS OF UNSPECIFIED DEEP VEINS OF UNSPECIFIED LOWER EXTREMITY: ICD-10-CM

## 2025-04-24 DIAGNOSIS — N40.0 BENIGN PROSTATIC HYPERPLASIA WITHOUT LOWER URINARY TRACT SYMPMS: ICD-10-CM

## 2025-04-24 DIAGNOSIS — E78.5 HYPERLIPIDEMIA, UNSPECIFIED: ICD-10-CM

## 2025-04-24 DIAGNOSIS — I48.92 UNSPECIFIED ATRIAL FLUTTER: ICD-10-CM

## 2025-04-24 DIAGNOSIS — N52.9 MALE ERECTILE DYSFUNCTION, UNSPECIFIED: ICD-10-CM

## 2025-04-24 PROCEDURE — 99213 OFFICE O/P EST LOW 20 MIN: CPT | Mod: 25

## 2025-04-24 PROCEDURE — 93000 ELECTROCARDIOGRAM COMPLETE: CPT

## 2025-04-24 RX ORDER — SILDENAFIL 50 MG/1
50 TABLET ORAL
Qty: 10 | Refills: 1 | Status: ACTIVE | COMMUNITY
Start: 2025-04-24 | End: 1900-01-01

## 2025-06-24 ENCOUNTER — RX RENEWAL (OUTPATIENT)
Age: 65
End: 2025-06-24

## 2025-06-26 ENCOUNTER — NON-APPOINTMENT (OUTPATIENT)
Age: 65
End: 2025-06-26

## 2025-06-26 ENCOUNTER — APPOINTMENT (OUTPATIENT)
Dept: CARDIOLOGY | Facility: CLINIC | Age: 65
End: 2025-06-26

## 2025-06-26 VITALS
WEIGHT: 285 LBS | HEIGHT: 73 IN | HEART RATE: 62 BPM | BODY MASS INDEX: 37.77 KG/M2 | OXYGEN SATURATION: 100 % | DIASTOLIC BLOOD PRESSURE: 77 MMHG | TEMPERATURE: 97.5 F | SYSTOLIC BLOOD PRESSURE: 132 MMHG

## 2025-06-26 PROCEDURE — 99213 OFFICE O/P EST LOW 20 MIN: CPT | Mod: 25

## 2025-06-26 PROCEDURE — 93000 ELECTROCARDIOGRAM COMPLETE: CPT

## 2025-08-11 ENCOUNTER — RX RENEWAL (OUTPATIENT)
Age: 65
End: 2025-08-11

## 2025-09-04 ENCOUNTER — RX RENEWAL (OUTPATIENT)
Age: 65
End: 2025-09-04